# Patient Record
Sex: FEMALE | Race: WHITE | Employment: OTHER | URBAN - METROPOLITAN AREA
[De-identification: names, ages, dates, MRNs, and addresses within clinical notes are randomized per-mention and may not be internally consistent; named-entity substitution may affect disease eponyms.]

---

## 2019-08-15 ENCOUNTER — OFFICE VISIT (OUTPATIENT)
Dept: FAMILY MEDICINE CLINIC | Age: 79
End: 2019-08-15
Payer: MEDICARE

## 2019-08-15 VITALS
HEART RATE: 58 BPM | SYSTOLIC BLOOD PRESSURE: 151 MMHG | BODY MASS INDEX: 21.32 KG/M2 | DIASTOLIC BLOOD PRESSURE: 65 MMHG | OXYGEN SATURATION: 100 % | WEIGHT: 108.6 LBS | HEIGHT: 60 IN

## 2019-08-15 DIAGNOSIS — E78.5 DYSLIPIDEMIA: ICD-10-CM

## 2019-08-15 DIAGNOSIS — K21.9 GASTROESOPHAGEAL REFLUX DISEASE, ESOPHAGITIS PRESENCE NOT SPECIFIED: ICD-10-CM

## 2019-08-15 DIAGNOSIS — Z80.9 FH: CANCER: ICD-10-CM

## 2019-08-15 DIAGNOSIS — Z87.891 EX-SMOKER FOR MORE THAN 1 YEAR: ICD-10-CM

## 2019-08-15 DIAGNOSIS — N18.2 CHRONIC RENAL FAILURE, STAGE 2 (MILD): ICD-10-CM

## 2019-08-15 DIAGNOSIS — Z13.29 SCREENING FOR THYROID DISORDER: ICD-10-CM

## 2019-08-15 DIAGNOSIS — D64.9 ANEMIA, UNSPECIFIED TYPE: ICD-10-CM

## 2019-08-15 DIAGNOSIS — M81.0 AGE-RELATED OSTEOPOROSIS WITHOUT CURRENT PATHOLOGICAL FRACTURE: Primary | ICD-10-CM

## 2019-08-15 DIAGNOSIS — Z87.11 HISTORY OF BLEEDING PEPTIC ULCER: ICD-10-CM

## 2019-08-15 DIAGNOSIS — I10 ESSENTIAL HYPERTENSION: ICD-10-CM

## 2019-08-15 DIAGNOSIS — Z23 NEED FOR PNEUMOCOCCAL VACCINATION: ICD-10-CM

## 2019-08-15 DIAGNOSIS — G62.9 NEUROPATHY: ICD-10-CM

## 2019-08-15 DIAGNOSIS — E55.9 VITAMIN D DEFICIENCY: ICD-10-CM

## 2019-08-15 DIAGNOSIS — Z86.73 HISTORY OF ARTERIAL ISCHEMIC STROKE: ICD-10-CM

## 2019-08-15 DIAGNOSIS — R73.9 HYPERGLYCEMIA: ICD-10-CM

## 2019-08-15 PROCEDURE — 1090F PRES/ABSN URINE INCON ASSESS: CPT | Performed by: FAMILY MEDICINE

## 2019-08-15 PROCEDURE — 1036F TOBACCO NON-USER: CPT | Performed by: FAMILY MEDICINE

## 2019-08-15 PROCEDURE — 4040F PNEUMOC VAC/ADMIN/RCVD: CPT | Performed by: FAMILY MEDICINE

## 2019-08-15 PROCEDURE — 99204 OFFICE O/P NEW MOD 45 MIN: CPT | Performed by: FAMILY MEDICINE

## 2019-08-15 PROCEDURE — 1123F ACP DISCUSS/DSCN MKR DOCD: CPT | Performed by: FAMILY MEDICINE

## 2019-08-15 PROCEDURE — 90670 PCV13 VACCINE IM: CPT | Performed by: FAMILY MEDICINE

## 2019-08-15 PROCEDURE — G8420 CALC BMI NORM PARAMETERS: HCPCS | Performed by: FAMILY MEDICINE

## 2019-08-15 PROCEDURE — G0009 ADMIN PNEUMOCOCCAL VACCINE: HCPCS | Performed by: FAMILY MEDICINE

## 2019-08-15 PROCEDURE — G8400 PT W/DXA NO RESULTS DOC: HCPCS | Performed by: FAMILY MEDICINE

## 2019-08-15 PROCEDURE — G8427 DOCREV CUR MEDS BY ELIG CLIN: HCPCS | Performed by: FAMILY MEDICINE

## 2019-08-15 RX ORDER — CLOPIDOGREL BISULFATE 75 MG/1
75 TABLET ORAL DAILY
Qty: 90 TABLET | Refills: 1 | Status: SHIPPED | OUTPATIENT
Start: 2019-08-15 | End: 2019-09-12 | Stop reason: CLARIF

## 2019-08-15 RX ORDER — LISINOPRIL 30 MG/1
TABLET ORAL
COMMUNITY
End: 2019-08-28 | Stop reason: ALTCHOICE

## 2019-08-15 RX ORDER — CLONIDINE HYDROCHLORIDE 0.1 MG/1
0.1 TABLET ORAL 2 TIMES DAILY
COMMUNITY
End: 2019-08-15 | Stop reason: CLARIF

## 2019-08-15 RX ORDER — OMEPRAZOLE 40 MG/1
CAPSULE, DELAYED RELEASE ORAL
COMMUNITY
End: 2019-09-12 | Stop reason: ALTCHOICE

## 2019-08-15 RX ORDER — GABAPENTIN 300 MG/1
300 CAPSULE ORAL 3 TIMES DAILY
COMMUNITY
End: 2019-08-15 | Stop reason: DRUGHIGH

## 2019-08-15 RX ORDER — GABAPENTIN 300 MG/1
300 CAPSULE ORAL NIGHTLY
Qty: 90 CAPSULE | Refills: 0 | Status: SHIPPED | OUTPATIENT
Start: 2019-08-15 | End: 2019-09-12 | Stop reason: SDUPTHER

## 2019-08-15 RX ORDER — CLOPIDOGREL BISULFATE 75 MG/1
75 TABLET ORAL DAILY
COMMUNITY
End: 2019-08-15 | Stop reason: DRUGHIGH

## 2019-08-15 RX ORDER — ATORVASTATIN CALCIUM 10 MG/1
10 TABLET, FILM COATED ORAL DAILY
COMMUNITY
End: 2019-09-12 | Stop reason: CLARIF

## 2019-08-15 RX ORDER — ACETAMINOPHEN 500 MG
TABLET ORAL
COMMUNITY

## 2019-08-15 ASSESSMENT — ENCOUNTER SYMPTOMS
ABDOMINAL PAIN: 0
DIARRHEA: 0
EYE PAIN: 0
COLOR CHANGE: 0
RECTAL PAIN: 0
ANAL BLEEDING: 0
BACK PAIN: 0
EYE DISCHARGE: 0
CHEST TIGHTNESS: 0
VOICE CHANGE: 0
NAUSEA: 0
TROUBLE SWALLOWING: 0
EYE REDNESS: 0
BLOOD IN STOOL: 0
SHORTNESS OF BREATH: 0
CONSTIPATION: 0
COUGH: 0
VOMITING: 0
SINUS PRESSURE: 0
ABDOMINAL DISTENTION: 0

## 2019-08-15 ASSESSMENT — PATIENT HEALTH QUESTIONNAIRE - PHQ9
SUM OF ALL RESPONSES TO PHQ9 QUESTIONS 1 & 2: 0
SUM OF ALL RESPONSES TO PHQ QUESTIONS 1-9: 0
SUM OF ALL RESPONSES TO PHQ9 QUESTIONS 1 & 2: 1
2. FEELING DOWN, DEPRESSED OR HOPELESS: 0
1. LITTLE INTEREST OR PLEASURE IN DOING THINGS: 0
SUM OF ALL RESPONSES TO PHQ QUESTIONS 1-9: 0
SUM OF ALL RESPONSES TO PHQ QUESTIONS 1-9: 1
SUM OF ALL RESPONSES TO PHQ QUESTIONS 1-9: 0
1. LITTLE INTEREST OR PLEASURE IN DOING THINGS: 0
SUM OF ALL RESPONSES TO PHQ QUESTIONS 1-9: 1
2. FEELING DOWN, DEPRESSED OR HOPELESS: 1
SUM OF ALL RESPONSES TO PHQ QUESTIONS 1-9: 0
SUM OF ALL RESPONSES TO PHQ9 QUESTIONS 1 & 2: 0
2. FEELING DOWN, DEPRESSED OR HOPELESS: 0
1. LITTLE INTEREST OR PLEASURE IN DOING THINGS: 0

## 2019-08-15 NOTE — PROGRESS NOTES
Lipid Panel   3. Essential hypertension     4. History of arterial ischemic stroke  clopidogrel (PLAVIX) 75 MG tablet   5. Screening for thyroid disorder  T3, Free    T4, Free    TSH without Reflex   6. Hyperglycemia  Hemoglobin A1C   7. Anemia, unspecified type  CBC    Folate    Vitamin B12   8. Vitamin D deficiency  Vitamin D 25 Hydroxy   9. Need for pneumococcal vaccination  Pneumococcal conjugate vaccine 13-valent   10. History of bleeding peptic ulcer     11. Neuropathy  gabapentin (NEURONTIN) 300 MG capsule   12. Ex-smoker for more than 1 year           Plan:      Orders Placed This Encounter   Procedures    DEXA BONE DENSITY 2 SITES    Pneumococcal conjugate vaccine 13-valent    CBC    Comprehensive Metabolic Panel    Folate    Hemoglobin A1C    Lipid Panel    T3, Free    T4, Free    TSH without Reflex    Vitamin B12    Vitamin D 25 Hydroxy       Outpatient Encounter Medications as of 8/15/2019   Medication Sig Dispense Refill    omeprazole (PRILOSEC) 40 MG delayed release capsule omeprazole 40 mg capsule,delayed release   Take 1 capsule twice a day by oral route for 90 days.  lisinopril (PRINIVIL;ZESTRIL) 30 MG tablet lisinopril      atorvastatin (LIPITOR) 10 MG tablet Take 10 mg by mouth daily      BABY ASPIRIN PO Take by mouth      Calcium Carbonate-Vit D-Min (CALCIUM 1200) 3771-0875 MG-UNIT CHEW Take by mouth      gabapentin (NEURONTIN) 300 MG capsule Take 1 capsule by mouth nightly for 90 days. Intended supply: 90 days 90 capsule 0    clopidogrel (PLAVIX) 75 MG tablet Take 1 tablet by mouth daily 90 tablet 1    [DISCONTINUED] clopidogrel (PLAVIX) 75 MG tablet Take 75 mg by mouth daily      [DISCONTINUED] gabapentin (NEURONTIN) 300 MG capsule Take 300 mg by mouth 3 times daily.  [DISCONTINUED] cloNIDine (CATAPRES) 0.1 MG tablet Take 0.1 mg by mouth 2 times daily       No facility-administered encounter medications on file as of 8/15/2019.             Danni Hutchins MD

## 2019-08-19 ENCOUNTER — TELEPHONE (OUTPATIENT)
Dept: FAMILY MEDICINE CLINIC | Age: 79
End: 2019-08-19

## 2019-08-19 ENCOUNTER — HOSPITAL ENCOUNTER (OUTPATIENT)
Age: 79
Discharge: HOME | End: 2019-08-19
Payer: MEDICARE

## 2019-08-19 DIAGNOSIS — Z13.29 SCREENING FOR THYROID DISORDER: ICD-10-CM

## 2019-08-19 DIAGNOSIS — E55.9 VITAMIN D DEFICIENCY: ICD-10-CM

## 2019-08-19 DIAGNOSIS — E78.5 DYSLIPIDEMIA: ICD-10-CM

## 2019-08-19 DIAGNOSIS — D64.9 ANEMIA, UNSPECIFIED TYPE: ICD-10-CM

## 2019-08-19 DIAGNOSIS — R73.9 HYPERGLYCEMIA: ICD-10-CM

## 2019-08-19 LAB
ALBUMIN SERPL-MCNC: 4.8 G/DL (ref 3.5–5.2)
ALBUMIN/GLOBULIN RATIO: 2.2 (ref 1–2.5)
ALP BLD-CCNC: 74 U/L (ref 35–104)
ALT SERPL-CCNC: 11 U/L (ref 5–33)
ANION GAP SERPL CALCULATED.3IONS-SCNC: 13 MMOL/L (ref 9–17)
AST SERPL-CCNC: 18 U/L
BILIRUB SERPL-MCNC: 0.45 MG/DL (ref 0.3–1.2)
BUN BLDV-MCNC: 24 MG/DL (ref 8–23)
BUN/CREAT BLD: ABNORMAL (ref 9–20)
CALCIUM SERPL-MCNC: 9.8 MG/DL (ref 8.6–10.4)
CHLORIDE BLD-SCNC: 104 MMOL/L (ref 98–107)
CHOLESTEROL/HDL RATIO: 2
CHOLESTEROL: 221 MG/DL
CO2: 25 MMOL/L (ref 20–31)
CREAT SERPL-MCNC: 0.92 MG/DL (ref 0.5–0.9)
ESTIMATED AVERAGE GLUCOSE: 88 MG/DL
FOLATE: 10.7 NG/ML
GFR AFRICAN AMERICAN: >60 ML/MIN
GFR NON-AFRICAN AMERICAN: 59 ML/MIN
GFR SERPL CREATININE-BSD FRML MDRD: ABNORMAL ML/MIN/{1.73_M2}
GFR SERPL CREATININE-BSD FRML MDRD: ABNORMAL ML/MIN/{1.73_M2}
GLUCOSE BLD-MCNC: 97 MG/DL (ref 70–99)
HBA1C MFR BLD: 4.7 % (ref 4–6)
HCT VFR BLD CALC: 38.6 % (ref 36.3–47.1)
HDLC SERPL-MCNC: 108 MG/DL
HEMOGLOBIN: 12.1 G/DL (ref 11.9–15.1)
LDL CHOLESTEROL: 97 MG/DL (ref 0–130)
MCH RBC QN AUTO: 31.8 PG (ref 25.2–33.5)
MCHC RBC AUTO-ENTMCNC: 31.3 G/DL (ref 28.4–34.8)
MCV RBC AUTO: 101.6 FL (ref 82.6–102.9)
NRBC AUTOMATED: 0 PER 100 WBC
PDW BLD-RTO: 12.8 % (ref 11.8–14.4)
PLATELET # BLD: 168 K/UL (ref 138–453)
PMV BLD AUTO: 11.2 FL (ref 8.1–13.5)
POTASSIUM SERPL-SCNC: 4.3 MMOL/L (ref 3.7–5.3)
RBC # BLD: 3.8 M/UL (ref 3.95–5.11)
SODIUM BLD-SCNC: 142 MMOL/L (ref 135–144)
T3 FREE: 2.47 PG/ML (ref 2.02–4.43)
THYROXINE, FREE: 1.18 NG/DL (ref 0.93–1.7)
TOTAL PROTEIN: 7 G/DL (ref 6.4–8.3)
TRIGL SERPL-MCNC: 81 MG/DL
TSH SERPL DL<=0.05 MIU/L-ACNC: 0.6 MIU/L (ref 0.3–5)
VITAMIN B-12: 340 PG/ML (ref 232–1245)
VITAMIN D 25-HYDROXY: 39.3 NG/ML (ref 30–100)
VLDLC SERPL CALC-MCNC: ABNORMAL MG/DL (ref 1–30)
WBC # BLD: 3.9 K/UL (ref 3.5–11.3)

## 2019-08-19 PROCEDURE — 36415 COLL VENOUS BLD VENIPUNCTURE: CPT

## 2019-08-19 PROCEDURE — 84439 ASSAY OF FREE THYROXINE: CPT

## 2019-08-19 PROCEDURE — 82746 ASSAY OF FOLIC ACID SERUM: CPT

## 2019-08-19 PROCEDURE — 83036 HEMOGLOBIN GLYCOSYLATED A1C: CPT

## 2019-08-19 PROCEDURE — 85027 COMPLETE CBC AUTOMATED: CPT

## 2019-08-19 PROCEDURE — 82306 VITAMIN D 25 HYDROXY: CPT

## 2019-08-19 PROCEDURE — 84443 ASSAY THYROID STIM HORMONE: CPT

## 2019-08-19 PROCEDURE — 82607 VITAMIN B-12: CPT

## 2019-08-19 PROCEDURE — 80061 LIPID PANEL: CPT

## 2019-08-19 PROCEDURE — 80053 COMPREHEN METABOLIC PANEL: CPT

## 2019-08-19 PROCEDURE — 84481 FREE ASSAY (FT-3): CPT

## 2019-08-28 PROBLEM — N18.2 CHRONIC RENAL FAILURE, STAGE 2 (MILD): Status: ACTIVE | Noted: 2019-08-28

## 2019-08-28 RX ORDER — CLOPIDOGREL BISULFATE 75 MG/1
75 TABLET ORAL DAILY
Qty: 90 TABLET | Refills: 1 | Status: SHIPPED | OUTPATIENT
Start: 2019-08-28 | End: 2020-01-07 | Stop reason: DRUGHIGH

## 2019-08-28 RX ORDER — METOPROLOL SUCCINATE 25 MG/1
25 TABLET, EXTENDED RELEASE ORAL DAILY
Qty: 90 TABLET | Refills: 0 | Status: SHIPPED | OUTPATIENT
Start: 2019-08-28 | End: 2019-10-24 | Stop reason: DRUGHIGH

## 2019-08-28 RX ORDER — LISINOPRIL 20 MG/1
20 TABLET ORAL DAILY
COMMUNITY
End: 2019-09-12 | Stop reason: CLARIF

## 2019-08-28 RX ORDER — LISINOPRIL 20 MG/1
20 TABLET ORAL DAILY
Qty: 90 TABLET | Refills: 0 | Status: SHIPPED | OUTPATIENT
Start: 2019-08-28 | End: 2019-10-16 | Stop reason: SDUPTHER

## 2019-08-28 RX ORDER — ATORVASTATIN CALCIUM 10 MG/1
10 TABLET, FILM COATED ORAL DAILY
Qty: 90 TABLET | Refills: 1 | Status: SHIPPED | OUTPATIENT
Start: 2019-08-28 | End: 2019-12-18 | Stop reason: SDUPTHER

## 2019-08-28 RX ORDER — OMEPRAZOLE 40 MG/1
40 CAPSULE, DELAYED RELEASE ORAL
Qty: 90 CAPSULE | Refills: 0 | Status: SHIPPED | OUTPATIENT
Start: 2019-08-28 | End: 2019-09-12 | Stop reason: ALTCHOICE

## 2019-08-28 RX ORDER — GABAPENTIN 300 MG/1
300 CAPSULE ORAL NIGHTLY
Qty: 90 CAPSULE | Refills: 0 | Status: SHIPPED | OUTPATIENT
Start: 2019-08-28 | End: 2019-09-12 | Stop reason: SDUPTHER

## 2019-09-12 ENCOUNTER — OFFICE VISIT (OUTPATIENT)
Dept: FAMILY MEDICINE CLINIC | Age: 79
End: 2019-09-12
Payer: MEDICARE

## 2019-09-12 VITALS
HEIGHT: 60 IN | SYSTOLIC BLOOD PRESSURE: 140 MMHG | DIASTOLIC BLOOD PRESSURE: 66 MMHG | HEART RATE: 61 BPM | BODY MASS INDEX: 21.6 KG/M2 | WEIGHT: 110 LBS | OXYGEN SATURATION: 99 %

## 2019-09-12 DIAGNOSIS — Z23 NEED FOR INFLUENZA VACCINATION: ICD-10-CM

## 2019-09-12 DIAGNOSIS — N18.2 CHRONIC RENAL FAILURE, STAGE 2 (MILD): ICD-10-CM

## 2019-09-12 DIAGNOSIS — I10 ESSENTIAL HYPERTENSION: ICD-10-CM

## 2019-09-12 DIAGNOSIS — E78.5 DYSLIPIDEMIA: ICD-10-CM

## 2019-09-12 DIAGNOSIS — Z12.31 SCREENING MAMMOGRAM, ENCOUNTER FOR: ICD-10-CM

## 2019-09-12 DIAGNOSIS — Z87.891 EX-SMOKER FOR MORE THAN 1 YEAR: ICD-10-CM

## 2019-09-12 DIAGNOSIS — K21.9 GASTROESOPHAGEAL REFLUX DISEASE, ESOPHAGITIS PRESENCE NOT SPECIFIED: ICD-10-CM

## 2019-09-12 DIAGNOSIS — G62.9 NEUROPATHY: ICD-10-CM

## 2019-09-12 DIAGNOSIS — Z86.73 HISTORY OF ARTERIAL ISCHEMIC STROKE: ICD-10-CM

## 2019-09-12 DIAGNOSIS — N18.2 CHRONIC KIDNEY DISEASE, STAGE 2, MILDLY DECREASED GFR: ICD-10-CM

## 2019-09-12 DIAGNOSIS — E53.8 B12 DEFICIENCY: Primary | ICD-10-CM

## 2019-09-12 DIAGNOSIS — Z87.11 HISTORY OF BLEEDING PEPTIC ULCER: ICD-10-CM

## 2019-09-12 PROCEDURE — G8427 DOCREV CUR MEDS BY ELIG CLIN: HCPCS | Performed by: FAMILY MEDICINE

## 2019-09-12 PROCEDURE — 1090F PRES/ABSN URINE INCON ASSESS: CPT | Performed by: FAMILY MEDICINE

## 2019-09-12 PROCEDURE — 4040F PNEUMOC VAC/ADMIN/RCVD: CPT | Performed by: FAMILY MEDICINE

## 2019-09-12 PROCEDURE — G8420 CALC BMI NORM PARAMETERS: HCPCS | Performed by: FAMILY MEDICINE

## 2019-09-12 PROCEDURE — 90653 IIV ADJUVANT VACCINE IM: CPT | Performed by: FAMILY MEDICINE

## 2019-09-12 PROCEDURE — 99214 OFFICE O/P EST MOD 30 MIN: CPT | Performed by: FAMILY MEDICINE

## 2019-09-12 PROCEDURE — G0008 ADMIN INFLUENZA VIRUS VAC: HCPCS | Performed by: FAMILY MEDICINE

## 2019-09-12 PROCEDURE — G8400 PT W/DXA NO RESULTS DOC: HCPCS | Performed by: FAMILY MEDICINE

## 2019-09-12 PROCEDURE — 1036F TOBACCO NON-USER: CPT | Performed by: FAMILY MEDICINE

## 2019-09-12 PROCEDURE — 1123F ACP DISCUSS/DSCN MKR DOCD: CPT | Performed by: FAMILY MEDICINE

## 2019-09-12 RX ORDER — MAGNESIUM 200 MG
1 TABLET ORAL DAILY
Qty: 90 TABLET | Refills: 5 | Status: SHIPPED | OUTPATIENT
Start: 2019-09-12

## 2019-09-12 RX ORDER — PANTOPRAZOLE SODIUM 40 MG/1
40 TABLET, DELAYED RELEASE ORAL
Qty: 90 TABLET | Refills: 1 | Status: SHIPPED | OUTPATIENT
Start: 2019-09-12 | End: 2019-10-24 | Stop reason: DRUGHIGH

## 2019-09-12 RX ORDER — GABAPENTIN 300 MG/1
300 CAPSULE ORAL NIGHTLY
Qty: 90 CAPSULE | Refills: 0 | Status: SHIPPED | OUTPATIENT
Start: 2019-09-12 | End: 2019-12-05 | Stop reason: SDUPTHER

## 2019-09-12 ASSESSMENT — ENCOUNTER SYMPTOMS
COLOR CHANGE: 0
NAUSEA: 0
SHORTNESS OF BREATH: 0
ABDOMINAL DISTENTION: 0
CHEST TIGHTNESS: 0
ABDOMINAL PAIN: 0
COUGH: 0
VOMITING: 0
DIARRHEA: 0
CONSTIPATION: 0
ANAL BLEEDING: 0
EYE DISCHARGE: 0
EYE REDNESS: 0
TROUBLE SWALLOWING: 0
RECTAL PAIN: 0
EYE PAIN: 0
BLOOD IN STOOL: 0
VOICE CHANGE: 0
BACK PAIN: 0
SINUS PRESSURE: 0

## 2019-09-12 ASSESSMENT — PATIENT HEALTH QUESTIONNAIRE - PHQ9
SUM OF ALL RESPONSES TO PHQ QUESTIONS 1-9: 0
1. LITTLE INTEREST OR PLEASURE IN DOING THINGS: 0
2. FEELING DOWN, DEPRESSED OR HOPELESS: 0
SUM OF ALL RESPONSES TO PHQ9 QUESTIONS 1 & 2: 0
SUM OF ALL RESPONSES TO PHQ QUESTIONS 1-9: 0

## 2019-09-12 NOTE — PROGRESS NOTES
Subjective:      Patient ID: Lidia Contreras is a 67 y.o. female. HPI  Has still  be taking 30 mg of lisinopril. States has rx for toprol 25 as well as lisinopril 20 mg that I recommended. Her labs show lowered   GFR and elevated creatinine and I have discussed this with patient. So starting today she will cut down her lisinopril to 20 mg instead of the 30 mg a day she is currently on ( used to be on 30 mg BID till recently)  States used to be on toprol in the past but stopped. Review of Systems   Constitutional: Negative for activity change, appetite change and fatigue. HENT: Negative for dental problem, ear pain, hearing loss, postnasal drip, sinus pressure, sneezing, tinnitus, trouble swallowing and voice change. Eyes: Negative for pain, discharge, redness and visual disturbance. Respiratory: Negative for cough, chest tightness and shortness of breath. Cardiovascular: Negative for chest pain, palpitations and leg swelling. Gastrointestinal: Negative for abdominal distention, abdominal pain, anal bleeding, blood in stool, constipation, diarrhea, nausea, rectal pain and vomiting. Endocrine: Negative for cold intolerance, heat intolerance, polydipsia, polyphagia and polyuria. Genitourinary: Negative for decreased urine volume, difficulty urinating, dyspareunia, dysuria, enuresis, flank pain, frequency, genital sores, hematuria, menstrual problem, pelvic pain, urgency, vaginal bleeding and vaginal discharge. Musculoskeletal: Negative for arthralgias, back pain, gait problem, joint swelling, myalgias, neck pain and neck stiffness. Skin: Negative for color change, pallor and rash. Allergic/Immunologic: Negative for environmental allergies, food allergies and immunocompromised state. Neurological: Negative for dizziness, tremors, seizures, syncope, facial asymmetry, speech difficulty, weakness, light-headedness, numbness and headaches. Hematological: Negative for adenopathy.  Does not Encounter   Procedures    INFLUENZA, TRIV, INACTIVATED, SUBUNIT, ADJUVANTED, 65 YRS AND OLDER, IM, PREFILL SYR, 0.5ML (FLUAD TRIV)    Basic Metabolic Panel       Outpatient Encounter Medications as of 9/12/2019   Medication Sig Dispense Refill    Cyanocobalamin (B-12) 1000 MCG SUBL Place 1 tablet under the tongue daily 90 tablet 5    clopidogrel (PLAVIX) 75 MG tablet Take 1 tablet by mouth daily 90 tablet 1    omeprazole (PRILOSEC) 40 MG delayed release capsule Take 1 capsule by mouth every morning (before breakfast) 90 capsule 0    gabapentin (NEURONTIN) 300 MG capsule Take 1 capsule by mouth nightly for 90 doses. Intended supply: 90 days 90 capsule 0    atorvastatin (LIPITOR) 10 MG tablet Take 1 tablet by mouth daily 90 tablet 1    lisinopril (PRINIVIL;ZESTRIL) 20 MG tablet Take 1 tablet by mouth daily 90 tablet 0    metoprolol succinate (TOPROL XL) 25 MG extended release tablet Take 1 tablet by mouth daily 90 tablet 0    lisinopril (PRINIVIL;ZESTRIL) 20 MG tablet Take 20 mg by mouth daily      omeprazole (PRILOSEC) 40 MG delayed release capsule omeprazole 40 mg capsule,delayed release   Take 1 capsule twice a day by oral route for 90 days.  atorvastatin (LIPITOR) 10 MG tablet Take 10 mg by mouth daily      BABY ASPIRIN PO Take by mouth      Calcium Carbonate-Vit D-Min (CALCIUM 1200) 2043-6150 MG-UNIT CHEW Take by mouth      gabapentin (NEURONTIN) 300 MG capsule Take 1 capsule by mouth nightly for 90 days. Intended supply: 90 days 90 capsule 0    clopidogrel (PLAVIX) 75 MG tablet Take 1 tablet by mouth daily 90 tablet 1     No facility-administered encounter medications on file as of 9/12/2019.             Qi Gutierrez MD

## 2019-09-24 ENCOUNTER — HOSPITAL ENCOUNTER (OUTPATIENT)
Age: 79
Discharge: HOME | End: 2019-09-24
Payer: MEDICARE

## 2019-09-24 DIAGNOSIS — N18.2 CHRONIC KIDNEY DISEASE, STAGE 2, MILDLY DECREASED GFR: ICD-10-CM

## 2019-09-24 LAB
ANION GAP SERPL CALCULATED.3IONS-SCNC: 15 MMOL/L (ref 9–17)
BUN BLDV-MCNC: 36 MG/DL (ref 8–23)
BUN/CREAT BLD: ABNORMAL (ref 9–20)
CALCIUM SERPL-MCNC: 9.6 MG/DL (ref 8.6–10.4)
CHLORIDE BLD-SCNC: 105 MMOL/L (ref 98–107)
CO2: 22 MMOL/L (ref 20–31)
CREAT SERPL-MCNC: 0.99 MG/DL (ref 0.5–0.9)
GFR AFRICAN AMERICAN: >60 ML/MIN
GFR NON-AFRICAN AMERICAN: 54 ML/MIN
GFR SERPL CREATININE-BSD FRML MDRD: ABNORMAL ML/MIN/{1.73_M2}
GFR SERPL CREATININE-BSD FRML MDRD: ABNORMAL ML/MIN/{1.73_M2}
GLUCOSE BLD-MCNC: 99 MG/DL (ref 70–99)
POTASSIUM SERPL-SCNC: 4.9 MMOL/L (ref 3.7–5.3)
SODIUM BLD-SCNC: 142 MMOL/L (ref 135–144)

## 2019-09-24 PROCEDURE — 36415 COLL VENOUS BLD VENIPUNCTURE: CPT

## 2019-09-24 PROCEDURE — 80048 BASIC METABOLIC PNL TOTAL CA: CPT

## 2019-10-15 ENCOUNTER — TELEPHONE (OUTPATIENT)
Dept: FAMILY MEDICINE CLINIC | Age: 79
End: 2019-10-15

## 2019-10-16 DIAGNOSIS — I10 ESSENTIAL HYPERTENSION: ICD-10-CM

## 2019-10-16 RX ORDER — LISINOPRIL 20 MG/1
20 TABLET ORAL DAILY
Qty: 90 TABLET | Refills: 0 | Status: SHIPPED | OUTPATIENT
Start: 2019-10-16 | End: 2019-10-24 | Stop reason: DRUGHIGH

## 2019-10-24 ENCOUNTER — OFFICE VISIT (OUTPATIENT)
Dept: FAMILY MEDICINE CLINIC | Age: 79
End: 2019-10-24
Payer: MEDICARE

## 2019-10-24 VITALS
BODY MASS INDEX: 21.79 KG/M2 | DIASTOLIC BLOOD PRESSURE: 69 MMHG | WEIGHT: 111 LBS | OXYGEN SATURATION: 99 % | SYSTOLIC BLOOD PRESSURE: 140 MMHG | HEIGHT: 60 IN | HEART RATE: 72 BPM

## 2019-10-24 DIAGNOSIS — I25.10 CORONARY ARTERY DISEASE INVOLVING NATIVE HEART WITHOUT ANGINA PECTORIS, UNSPECIFIED VESSEL OR LESION TYPE: Primary | ICD-10-CM

## 2019-10-24 DIAGNOSIS — I10 ESSENTIAL HYPERTENSION: ICD-10-CM

## 2019-10-24 DIAGNOSIS — R92.8 ABNORMAL MAMMOGRAM OF BOTH BREASTS: ICD-10-CM

## 2019-10-24 DIAGNOSIS — K21.9 GASTROESOPHAGEAL REFLUX DISEASE, ESOPHAGITIS PRESENCE NOT SPECIFIED: ICD-10-CM

## 2019-10-24 DIAGNOSIS — Z86.73 HISTORY OF ARTERIAL ISCHEMIC STROKE: ICD-10-CM

## 2019-10-24 DIAGNOSIS — Z87.11 HISTORY OF BLEEDING PEPTIC ULCER: ICD-10-CM

## 2019-10-24 DIAGNOSIS — Z87.891 EX-SMOKER FOR MORE THAN 1 YEAR: ICD-10-CM

## 2019-10-24 DIAGNOSIS — N18.2 CHRONIC RENAL FAILURE, STAGE 2 (MILD): ICD-10-CM

## 2019-10-24 DIAGNOSIS — E78.5 DYSLIPIDEMIA: ICD-10-CM

## 2019-10-24 DIAGNOSIS — G62.9 NEUROPATHY: ICD-10-CM

## 2019-10-24 PROCEDURE — 99214 OFFICE O/P EST MOD 30 MIN: CPT | Performed by: FAMILY MEDICINE

## 2019-10-24 PROCEDURE — G8427 DOCREV CUR MEDS BY ELIG CLIN: HCPCS | Performed by: FAMILY MEDICINE

## 2019-10-24 PROCEDURE — G8420 CALC BMI NORM PARAMETERS: HCPCS | Performed by: FAMILY MEDICINE

## 2019-10-24 PROCEDURE — G8400 PT W/DXA NO RESULTS DOC: HCPCS | Performed by: FAMILY MEDICINE

## 2019-10-24 PROCEDURE — 1123F ACP DISCUSS/DSCN MKR DOCD: CPT | Performed by: FAMILY MEDICINE

## 2019-10-24 PROCEDURE — 4040F PNEUMOC VAC/ADMIN/RCVD: CPT | Performed by: FAMILY MEDICINE

## 2019-10-24 PROCEDURE — G8482 FLU IMMUNIZE ORDER/ADMIN: HCPCS | Performed by: FAMILY MEDICINE

## 2019-10-24 PROCEDURE — G8598 ASA/ANTIPLAT THER USED: HCPCS | Performed by: FAMILY MEDICINE

## 2019-10-24 PROCEDURE — 1036F TOBACCO NON-USER: CPT | Performed by: FAMILY MEDICINE

## 2019-10-24 PROCEDURE — 1090F PRES/ABSN URINE INCON ASSESS: CPT | Performed by: FAMILY MEDICINE

## 2019-10-24 RX ORDER — LISINOPRIL 10 MG/1
10 TABLET ORAL DAILY
Qty: 90 TABLET | Refills: 0 | Status: SHIPPED | OUTPATIENT
Start: 2019-10-24 | End: 2019-12-18 | Stop reason: DRUGHIGH

## 2019-10-24 RX ORDER — ATORVASTATIN CALCIUM 10 MG/1
10 TABLET, FILM COATED ORAL DAILY
Qty: 90 TABLET | Refills: 1 | Status: CANCELLED | OUTPATIENT
Start: 2019-10-24

## 2019-10-24 RX ORDER — PANTOPRAZOLE SODIUM 20 MG/1
20 TABLET, DELAYED RELEASE ORAL
Qty: 90 TABLET | Refills: 0 | Status: SHIPPED | OUTPATIENT
Start: 2019-10-24 | End: 2020-01-07 | Stop reason: DRUGHIGH

## 2019-10-24 RX ORDER — CLOPIDOGREL BISULFATE 75 MG/1
75 TABLET ORAL DAILY
Qty: 90 TABLET | Refills: 0 | Status: CANCELLED | OUTPATIENT
Start: 2019-10-24

## 2019-10-24 RX ORDER — METOPROLOL SUCCINATE 50 MG/1
50 TABLET, EXTENDED RELEASE ORAL DAILY
Qty: 90 TABLET | Refills: 0 | Status: SHIPPED | OUTPATIENT
Start: 2019-10-24 | End: 2019-12-12 | Stop reason: SDUPTHER

## 2019-10-24 ASSESSMENT — ENCOUNTER SYMPTOMS
TROUBLE SWALLOWING: 0
EYE PAIN: 0
SHORTNESS OF BREATH: 0
COUGH: 0
ABDOMINAL PAIN: 0
CONSTIPATION: 0
SINUS PRESSURE: 0
ANAL BLEEDING: 0
BLOOD IN STOOL: 0
VOICE CHANGE: 0
EYE REDNESS: 0
DIARRHEA: 0
NAUSEA: 0
COLOR CHANGE: 0
EYE DISCHARGE: 0
VOMITING: 0
CHEST TIGHTNESS: 0
ABDOMINAL DISTENTION: 0
RECTAL PAIN: 0
BACK PAIN: 0

## 2019-10-24 ASSESSMENT — PATIENT HEALTH QUESTIONNAIRE - PHQ9
2. FEELING DOWN, DEPRESSED OR HOPELESS: 0
SUM OF ALL RESPONSES TO PHQ QUESTIONS 1-9: 0
SUM OF ALL RESPONSES TO PHQ9 QUESTIONS 1 & 2: 0
SUM OF ALL RESPONSES TO PHQ QUESTIONS 1-9: 0
1. LITTLE INTEREST OR PLEASURE IN DOING THINGS: 0

## 2019-12-05 RX ORDER — GABAPENTIN 300 MG/1
300 CAPSULE ORAL NIGHTLY
Qty: 90 CAPSULE | Refills: 1 | Status: SHIPPED | OUTPATIENT
Start: 2019-12-05 | End: 2020-01-07 | Stop reason: DRUGHIGH

## 2019-12-11 ENCOUNTER — HOSPITAL ENCOUNTER (OUTPATIENT)
Dept: MAMMOGRAPHY | Age: 79
Discharge: HOME | End: 2019-12-13
Payer: MEDICARE

## 2019-12-11 DIAGNOSIS — Z12.31 SCREENING MAMMOGRAM, ENCOUNTER FOR: ICD-10-CM

## 2019-12-11 PROCEDURE — 77063 BREAST TOMOSYNTHESIS BI: CPT

## 2019-12-12 RX ORDER — METOPROLOL SUCCINATE 50 MG/1
50 TABLET, EXTENDED RELEASE ORAL DAILY
Qty: 90 TABLET | Refills: 1 | Status: SHIPPED | OUTPATIENT
Start: 2019-12-12 | End: 2020-01-07 | Stop reason: DRUGHIGH

## 2019-12-18 RX ORDER — LISINOPRIL 5 MG/1
5 TABLET ORAL DAILY
Qty: 90 TABLET | Refills: 1 | Status: SHIPPED | OUTPATIENT
Start: 2019-12-18 | End: 2020-01-07 | Stop reason: DRUGHIGH

## 2019-12-18 RX ORDER — ATORVASTATIN CALCIUM 10 MG/1
10 TABLET, FILM COATED ORAL DAILY
Qty: 90 TABLET | Refills: 1 | Status: SHIPPED | OUTPATIENT
Start: 2019-12-18 | End: 2020-01-07 | Stop reason: DRUGHIGH

## 2020-01-02 ENCOUNTER — HOSPITAL ENCOUNTER (OUTPATIENT)
Age: 80
Discharge: HOME | End: 2020-01-02
Payer: MEDICARE

## 2020-01-02 LAB
ABSOLUTE EOS #: 0.14 K/UL (ref 0–0.44)
ABSOLUTE IMMATURE GRANULOCYTE: 0.07 K/UL (ref 0–0.3)
ABSOLUTE LYMPH #: 1.24 K/UL (ref 1.1–3.7)
ABSOLUTE MONO #: 0.48 K/UL (ref 0.1–1.2)
ANION GAP SERPL CALCULATED.3IONS-SCNC: 16 MMOL/L (ref 9–17)
BASOPHILS # BLD: 1 % (ref 0–2)
BASOPHILS ABSOLUTE: 0.05 K/UL (ref 0–0.2)
BUN BLDV-MCNC: 27 MG/DL (ref 8–23)
BUN/CREAT BLD: ABNORMAL (ref 9–20)
CALCIUM SERPL-MCNC: 9.3 MG/DL (ref 8.6–10.4)
CHLORIDE BLD-SCNC: 102 MMOL/L (ref 98–107)
CO2: 24 MMOL/L (ref 20–31)
CREAT SERPL-MCNC: 0.81 MG/DL (ref 0.5–0.9)
DIFFERENTIAL TYPE: ABNORMAL
EOSINOPHILS RELATIVE PERCENT: 2 % (ref 1–4)
GFR AFRICAN AMERICAN: >60 ML/MIN
GFR NON-AFRICAN AMERICAN: >60 ML/MIN
GFR SERPL CREATININE-BSD FRML MDRD: ABNORMAL ML/MIN/{1.73_M2}
GFR SERPL CREATININE-BSD FRML MDRD: ABNORMAL ML/MIN/{1.73_M2}
GLUCOSE BLD-MCNC: 95 MG/DL (ref 70–99)
HCT VFR BLD CALC: 35.9 % (ref 36.3–47.1)
HEMOGLOBIN: 11.3 G/DL (ref 11.9–15.1)
IMMATURE GRANULOCYTES: 1 %
LYMPHOCYTES # BLD: 20 % (ref 24–43)
MCH RBC QN AUTO: 30.3 PG (ref 25.2–33.5)
MCHC RBC AUTO-ENTMCNC: 31.5 G/DL (ref 28.4–34.8)
MCV RBC AUTO: 96.2 FL (ref 82.6–102.9)
MONOCYTES # BLD: 8 % (ref 3–12)
NRBC AUTOMATED: 0 PER 100 WBC
PDW BLD-RTO: 12.3 % (ref 11.8–14.4)
PLATELET # BLD: 265 K/UL (ref 138–453)
PLATELET ESTIMATE: ABNORMAL
PMV BLD AUTO: 10.1 FL (ref 8.1–13.5)
POTASSIUM SERPL-SCNC: 4.1 MMOL/L (ref 3.7–5.3)
RBC # BLD: 3.73 M/UL (ref 3.95–5.11)
RBC # BLD: ABNORMAL 10*6/UL
SEG NEUTROPHILS: 68 % (ref 36–65)
SEGMENTED NEUTROPHILS ABSOLUTE COUNT: 4.36 K/UL (ref 1.5–8.1)
SODIUM BLD-SCNC: 142 MMOL/L (ref 135–144)
WBC # BLD: 6.3 K/UL (ref 3.5–11.3)
WBC # BLD: ABNORMAL 10*3/UL

## 2020-01-02 PROCEDURE — 85025 COMPLETE CBC W/AUTO DIFF WBC: CPT

## 2020-01-02 PROCEDURE — 80048 BASIC METABOLIC PNL TOTAL CA: CPT

## 2020-01-02 PROCEDURE — 36415 COLL VENOUS BLD VENIPUNCTURE: CPT

## 2020-01-07 ENCOUNTER — OFFICE VISIT (OUTPATIENT)
Dept: FAMILY MEDICINE CLINIC | Age: 80
End: 2020-01-07
Payer: MEDICARE

## 2020-01-07 VITALS
HEIGHT: 60 IN | DIASTOLIC BLOOD PRESSURE: 70 MMHG | WEIGHT: 114.2 LBS | BODY MASS INDEX: 22.42 KG/M2 | HEART RATE: 65 BPM | SYSTOLIC BLOOD PRESSURE: 142 MMHG | OXYGEN SATURATION: 98 %

## 2020-01-07 PROCEDURE — 1123F ACP DISCUSS/DSCN MKR DOCD: CPT | Performed by: FAMILY MEDICINE

## 2020-01-07 PROCEDURE — G0439 PPPS, SUBSEQ VISIT: HCPCS | Performed by: FAMILY MEDICINE

## 2020-01-07 PROCEDURE — 4040F PNEUMOC VAC/ADMIN/RCVD: CPT | Performed by: FAMILY MEDICINE

## 2020-01-07 PROCEDURE — G8482 FLU IMMUNIZE ORDER/ADMIN: HCPCS | Performed by: FAMILY MEDICINE

## 2020-01-07 RX ORDER — METOPROLOL SUCCINATE 50 MG/1
50 TABLET, EXTENDED RELEASE ORAL DAILY
Qty: 90 TABLET | Refills: 1 | Status: SHIPPED | OUTPATIENT
Start: 2020-01-07 | End: 2020-03-23 | Stop reason: SDUPTHER

## 2020-01-07 RX ORDER — ATORVASTATIN CALCIUM 10 MG/1
10 TABLET, FILM COATED ORAL DAILY
Qty: 90 TABLET | Refills: 1 | Status: SHIPPED | OUTPATIENT
Start: 2020-01-07 | End: 2020-02-18 | Stop reason: SDUPTHER

## 2020-01-07 RX ORDER — PANTOPRAZOLE SODIUM 20 MG/1
20 TABLET, DELAYED RELEASE ORAL
Qty: 90 TABLET | Refills: 1 | Status: SHIPPED | OUTPATIENT
Start: 2020-01-07 | End: 2020-03-23 | Stop reason: SDUPTHER

## 2020-01-07 RX ORDER — CLOPIDOGREL BISULFATE 75 MG/1
75 TABLET ORAL DAILY
Qty: 30 TABLET | Refills: 3 | Status: SHIPPED | OUTPATIENT
Start: 2020-01-07 | End: 2020-03-23 | Stop reason: SDUPTHER

## 2020-01-07 RX ORDER — LISINOPRIL 5 MG/1
5 TABLET ORAL DAILY
Qty: 90 TABLET | Refills: 1 | Status: SHIPPED | OUTPATIENT
Start: 2020-01-07 | End: 2020-03-23 | Stop reason: SDUPTHER

## 2020-01-07 RX ORDER — GABAPENTIN 100 MG/1
100 CAPSULE ORAL NIGHTLY
Qty: 90 CAPSULE | Refills: 0 | Status: SHIPPED | OUTPATIENT
Start: 2020-01-07 | End: 2020-09-08

## 2020-01-07 ASSESSMENT — LIFESTYLE VARIABLES
AUDIT-C TOTAL SCORE: 1
HOW OFTEN DO YOU HAVE SIX OR MORE DRINKS ON ONE OCCASION: 0
HOW OFTEN DO YOU HAVE A DRINK CONTAINING ALCOHOL: 1
HOW MANY STANDARD DRINKS CONTAINING ALCOHOL DO YOU HAVE ON A TYPICAL DAY: 0

## 2020-01-07 ASSESSMENT — PATIENT HEALTH QUESTIONNAIRE - PHQ9
SUM OF ALL RESPONSES TO PHQ QUESTIONS 1-9: 0
SUM OF ALL RESPONSES TO PHQ QUESTIONS 1-9: 0

## 2020-01-07 NOTE — PROGRESS NOTES
Medicare Annual Wellness Visit  Name: Nevaeh Lopez Date: 2020   MRN: [id number] Sex: Female   Age: 67 y.o. Ethnicity: Non-/Non    : 1940 Race: Maren Baron is here for Medicare AWV and Discuss Labs    Screenings for behavioral, psychosocial and functional/safety risks, and cognitive dysfunction are all negative except as indicated below. These results, as well as other patient data from the [address] form, are documented in Flowsheets linked to this Encounter. All meds refilled. Was on iron infusion in [place] for Iron def. Had colonoscopy/EGD  by Dr Silvano Harvey 2 years ago and was ok. Allergies   Allergen Reactions    Clarithromycin     Levaquin  [Levofloxacin In D5w]     Levofloxacin     Sertraline        Prior to Visit Medications    Medication Sig Taking? Authorizing Provider   lisinopril (PRINIVIL;ZESTRIL) 5 MG tablet Take 1 tablet by mouth daily Yes Dylan Dahl MD   atorvastatin (LIPITOR) 10 MG tablet Take 1 tablet by mouth daily Yes Dylan Dahl MD   metoprolol succinate (TOPROL XL) 50 MG extended release tablet Take 1 tablet by mouth daily Yes Dylan Dahl MD   gabapentin (NEURONTIN) 300 MG capsule Take 1 capsule by mouth nightly for 180 days.  Intended supply: 90 days Yes Dylan Dahl MD   pantoprazole (PROTONIX) 20 MG tablet Take 1 tablet by mouth every morning (before breakfast) Yes Dylan Dahl MD   Cyanocobalamin (B-12) 1000 MCG SUBL Place 1 tablet under the tongue daily Yes Dylan Dahl MD   clopidogrel (PLAVIX) 75 MG tablet Take 1 tablet by mouth daily Yes Dylan Dahl MD   BABY ASPIRIN PO Take by mouth Indications: take 3 times a week ( she stopped and I have asked she resume as it was recommended by Cardio)  Yes Historical Provider, MD   Calcium Carbonate-Vit D-Min (CALCIUM 1200) 6714-2536 MG-UNIT CHEW Take by mouth Yes Historical Provider, MD       Past Medical History:   Diagnosis Date    Hypertension Past Surgical History:   Procedure Laterality Date    APPENDECTOMY      CHOLECYSTECTOMY      HYSTERECTOMY, TOTAL ABDOMINAL      TONSILLECTOMY         History reviewed. No pertinent family history. CareTeam (Including outside providers/suppliers regularly involved in providing care):   Patient Care Team:  Anders Morris MD as PCP - General (Family Medicine)  Anders Morris MD as PCP - [facility]    Wt Readings from Last 3 Encounters:   01/07/20 114 lb 3.2 oz (51.8 kg)   10/24/19 111 lb (50.3 kg)   09/12/19 110 lb (49.9 kg)     Vitals:    01/07/20 1023 01/07/20 1032   BP: (!) 152/75 (!) 146/75   Pulse: 68 65   SpO2: 98%    Weight: 114 lb 3.2 oz (51.8 kg)    Height: 5' (1.524 m)      Body mass index is 22.3 kg/m². Based upon direct observation of the patient, evaluation of cognition reveals recent and remote memory intact.     General Appearance: alert and oriented to person, place and time, well developed and well- nourished, in no acute distress  Skin: warm and dry, no rash or erythema  Head: normocephalic and atraumatic  Eyes: pupils equal, round, and reactive to light, extraocular eye movements intact, conjunctivae normal  ENT: tympanic membrane, external ear and ear canal normal bilaterally, nose without deformity, nasal mucosa and turbinates normal without polyps  Neck: supple and non-tender without mass, no thyromegaly or thyroid nodules, no cervical lymphadenopathy  Pulmonary/Chest: clear to auscultation bilaterally- no wheezes, rales or rhonchi, normal air movement, no respiratory distress  Cardiovascular: normal rate, regular rhythm, normal S1 and S2, no murmurs, rubs, clicks, or gallops, distal pulses intact, no carotid bruits  Abdomen: soft, non-tender, non-distended, normal bowel sounds, no masses or organomegaly  Extremities: no cyanosis, clubbing or edema  Musculoskeletal: normal range of motion, no joint swelling, deformity or tenderness  Neurologic: reflexes normal and

## 2020-01-07 NOTE — PATIENT INSTRUCTIONS
Personalized Preventive Plan for [place]  - 1/7/2020  Medicare offers a range of preventive health benefits. Some of the tests and screenings are paid in full while other may be subject to a deductible, co-insurance, and/or copay. Some of these benefits include a comprehensive review of your medical history including lifestyle, illnesses that may run in your family, and various assessments and screenings as appropriate. After reviewing your medical record and screening and assessments performed today your provider may have ordered immunizations, labs, imaging, and/or referrals for you. A list of these orders (if applicable) as well as your Preventive Care list are included within your After Visit Summary for your review. Other Preventive Recommendations:    · A preventive eye exam performed by an eye specialist is recommended every 1-2 years to screen for glaucoma; cataracts, macular degeneration, and other eye disorders. · A preventive dental visit is recommended every 6 months. · Try to get at least 150 minutes of exercise per week or 10,000 steps per day on a pedometer . · Order or download the FREE \"Exercise & Physical Activity: Your Everyday Guide\" from [employer]. Call 5-121.149.9846 or search [employer] online. · You need 9493-0323 mg of calcium and 7765-3677 IU of vitamin D per day. It is possible to meet your calcium requirement with diet alone, but a vitamin D supplement is usually necessary to meet this goal.  · When exposed to the sun, use a sunscreen that protects against both UVA and UVB radiation with an SPF of 30 or greater. Reapply every 2 to 3 hours or after sweating, drying off with a towel, or swimming. · Always wear a seat belt when traveling in a car. Always wear a helmet when riding a bicycle or motorcycle.

## 2020-01-10 ENCOUNTER — TELEPHONE (OUTPATIENT)
Dept: FAMILY MEDICINE CLINIC | Age: 80
End: 2020-01-10

## 2020-01-10 ENCOUNTER — HOSPITAL ENCOUNTER (OUTPATIENT)
Dept: MAMMOGRAPHY | Age: 80
Discharge: HOME | End: 2020-01-12
Payer: MEDICARE

## 2020-01-10 ENCOUNTER — HOSPITAL ENCOUNTER (OUTPATIENT)
Dept: ULTRASOUND IMAGING | Age: 80
Discharge: HOME | End: 2020-01-12
Payer: MEDICARE

## 2020-01-10 PROCEDURE — 76642 ULTRASOUND BREAST LIMITED: CPT

## 2020-01-10 PROCEDURE — G0279 TOMOSYNTHESIS, MAMMO: HCPCS

## 2020-01-10 NOTE — TELEPHONE ENCOUNTER
Pt called and stated that the cardiologist changed her atorvastatin from 10 to 40 and she now need the rx     So I told her to have the cardiologist send over his notes so you will know what's going on

## 2020-02-10 ENCOUNTER — HOSPITAL ENCOUNTER (OUTPATIENT)
Age: 80
Discharge: HOME | End: 2020-02-10
Payer: MEDICARE

## 2020-02-10 LAB
FERRITIN: 40 UG/L (ref 13–150)
HCT VFR BLD CALC: 37.5 % (ref 36.3–47.1)
HEMOGLOBIN: 11.5 G/DL (ref 11.9–15.1)
IRON SATURATION: 21 % (ref 20–55)
IRON: 62 UG/DL (ref 37–145)
MCH RBC QN AUTO: 30.3 PG (ref 25.2–33.5)
MCHC RBC AUTO-ENTMCNC: 30.7 G/DL (ref 28.4–34.8)
MCV RBC AUTO: 98.9 FL (ref 82.6–102.9)
NRBC AUTOMATED: 0 PER 100 WBC
PDW BLD-RTO: 13 % (ref 11.8–14.4)
PLATELET # BLD: 167 K/UL (ref 138–453)
PMV BLD AUTO: 11.1 FL (ref 8.1–13.5)
RBC # BLD: 3.79 M/UL (ref 3.95–5.11)
TOTAL IRON BINDING CAPACITY: 301 UG/DL (ref 250–450)
UNSATURATED IRON BINDING CAPACITY: 239 UG/DL (ref 112–347)
WBC # BLD: 4.1 K/UL (ref 3.5–11.3)

## 2020-02-10 PROCEDURE — 36415 COLL VENOUS BLD VENIPUNCTURE: CPT

## 2020-02-10 PROCEDURE — 82728 ASSAY OF FERRITIN: CPT

## 2020-02-10 PROCEDURE — 83550 IRON BINDING TEST: CPT

## 2020-02-10 PROCEDURE — 85027 COMPLETE CBC AUTOMATED: CPT

## 2020-02-10 PROCEDURE — 83540 ASSAY OF IRON: CPT

## 2020-02-18 ENCOUNTER — OFFICE VISIT (OUTPATIENT)
Dept: FAMILY MEDICINE CLINIC | Age: 80
End: 2020-02-18
Payer: MEDICARE

## 2020-02-18 VITALS
DIASTOLIC BLOOD PRESSURE: 74 MMHG | HEIGHT: 60 IN | OXYGEN SATURATION: 99 % | BODY MASS INDEX: 22.89 KG/M2 | HEART RATE: 59 BPM | SYSTOLIC BLOOD PRESSURE: 148 MMHG | WEIGHT: 116.6 LBS

## 2020-02-18 PROBLEM — N18.2 CHRONIC RENAL FAILURE, STAGE 2 (MILD): Status: RESOLVED | Noted: 2019-08-28 | Resolved: 2020-02-18

## 2020-02-18 PROBLEM — E53.8 B12 DEFICIENCY: Status: ACTIVE | Noted: 2020-02-18

## 2020-02-18 PROBLEM — I10 HYPERTENSION: Status: ACTIVE | Noted: 2020-02-18

## 2020-02-18 PROCEDURE — 1090F PRES/ABSN URINE INCON ASSESS: CPT | Performed by: FAMILY MEDICINE

## 2020-02-18 PROCEDURE — G8400 PT W/DXA NO RESULTS DOC: HCPCS | Performed by: FAMILY MEDICINE

## 2020-02-18 PROCEDURE — 4040F PNEUMOC VAC/ADMIN/RCVD: CPT | Performed by: FAMILY MEDICINE

## 2020-02-18 PROCEDURE — G8482 FLU IMMUNIZE ORDER/ADMIN: HCPCS | Performed by: FAMILY MEDICINE

## 2020-02-18 PROCEDURE — G8420 CALC BMI NORM PARAMETERS: HCPCS | Performed by: FAMILY MEDICINE

## 2020-02-18 PROCEDURE — G8427 DOCREV CUR MEDS BY ELIG CLIN: HCPCS | Performed by: FAMILY MEDICINE

## 2020-02-18 PROCEDURE — 1036F TOBACCO NON-USER: CPT | Performed by: FAMILY MEDICINE

## 2020-02-18 PROCEDURE — 1123F ACP DISCUSS/DSCN MKR DOCD: CPT | Performed by: FAMILY MEDICINE

## 2020-02-18 PROCEDURE — 99213 OFFICE O/P EST LOW 20 MIN: CPT | Performed by: FAMILY MEDICINE

## 2020-02-18 RX ORDER — ATORVASTATIN CALCIUM 40 MG/1
TABLET, FILM COATED ORAL
COMMUNITY
Start: 2020-01-24 | End: 2020-03-23 | Stop reason: SDUPTHER

## 2020-02-18 ASSESSMENT — PATIENT HEALTH QUESTIONNAIRE - PHQ9
SUM OF ALL RESPONSES TO PHQ QUESTIONS 1-9: 0
SUM OF ALL RESPONSES TO PHQ QUESTIONS 1-9: 0
1. LITTLE INTEREST OR PLEASURE IN DOING THINGS: 0
1. LITTLE INTEREST OR PLEASURE IN DOING THINGS: 0
SUM OF ALL RESPONSES TO PHQ9 QUESTIONS 1 & 2: 0
2. FEELING DOWN, DEPRESSED OR HOPELESS: 0
SUM OF ALL RESPONSES TO PHQ QUESTIONS 1-9: 0
SUM OF ALL RESPONSES TO PHQ9 QUESTIONS 1 & 2: 0
2. FEELING DOWN, DEPRESSED OR HOPELESS: 0
SUM OF ALL RESPONSES TO PHQ QUESTIONS 1-9: 0

## 2020-02-18 ASSESSMENT — ENCOUNTER SYMPTOMS
BACK PAIN: 0
TROUBLE SWALLOWING: 0
CONSTIPATION: 0
DIARRHEA: 0
EYE PAIN: 0
ANAL BLEEDING: 0
BLOOD IN STOOL: 0
EYE DISCHARGE: 0
SINUS PRESSURE: 0
COLOR CHANGE: 0
RECTAL PAIN: 0
ABDOMINAL PAIN: 0
SHORTNESS OF BREATH: 0
ABDOMINAL DISTENTION: 0
CHEST TIGHTNESS: 0
NAUSEA: 0
COUGH: 0
EYE REDNESS: 0
VOMITING: 0
VOICE CHANGE: 0

## 2020-02-18 NOTE — PROGRESS NOTES
Subjective:      Patient ID: Quentin Minaya is a 67 y.o. female. HPI State saw cardiologist and was advised to cont plavix and increased statin fron 10 to 40 mg daily.  was started on Plavix a year ago this may after her stroke. Cardiology has advised her to cont this long term. He will see her in a year. Will redo lipids and lft as well as BMP and cbc in 3 months. 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 Mood ok , sleep is on and off only sleeps for 3-4 hours and then tosses and turns.  wakes up to use RR twice but falls back to sleep after. This makes her tired in the daytime, sometimes has to take naps. her appetite ok as well. States finally BP is controlled at home when she checks it is always good and she does not have any   headaches or dizziness or other sx at present that she used to have after the meds were adjusted down. Review of Systems   Constitutional: Negative for activity change, appetite change and fatigue. HENT: Negative for dental problem, ear pain, hearing loss, postnasal drip, sinus pressure, sneezing, tinnitus, trouble swallowing and voice change. Eyes: Negative for pain, discharge, redness and visual disturbance. Respiratory: Negative for cough, chest tightness and shortness of breath. Cardiovascular: Negative for chest pain, palpitations and leg swelling. Gastrointestinal: Negative for abdominal distention, abdominal pain, anal bleeding, blood in stool, constipation, diarrhea, nausea, rectal pain and vomiting. Endocrine: Negative for cold intolerance, heat intolerance, polydipsia, polyphagia and polyuria. Genitourinary: Negative for decreased urine volume, difficulty urinating, dyspareunia, dysuria, enuresis, flank pain, frequency, genital sores, hematuria, menstrual problem, pelvic pain, urgency, vaginal bleeding and vaginal discharge.    Musculoskeletal: Negative for arthralgias, back pain, gait problem, joint swelling, myalgias, neck pain and neck stiffness. Skin: Negative for color change, pallor and rash. Allergic/Immunologic: Negative for environmental allergies, food allergies and immunocompromised state. Neurological: Negative for dizziness, tremors, seizures, syncope, facial asymmetry, speech difficulty, weakness, light-headedness, numbness and headaches. Hematological: Negative for adenopathy. Does not bruise/bleed easily. Psychiatric/Behavioral: Negative for agitation, behavioral problems, confusion, decreased concentration, sleep disturbance and suicidal ideas. The patient is not nervous/anxious. Objective:   Physical Exam  Constitutional:       General: She is not in acute distress. HENT:      Head: Normocephalic and atraumatic. Right Ear: External ear normal.      Left Ear: External ear normal.   Eyes:      Conjunctiva/sclera: Conjunctivae normal.      Pupils: Pupils are equal, round, and reactive to light. Neck:      Musculoskeletal: Normal range of motion. Thyroid: No thyromegaly. Trachea: No tracheal deviation. Cardiovascular:      Rate and Rhythm: Normal rate and regular rhythm. Heart sounds: No murmur. No friction rub. No gallop. Pulmonary:      Effort: No respiratory distress. Breath sounds: No stridor. No wheezing or rales. Chest:      Chest wall: No tenderness. Abdominal:      General: Bowel sounds are normal. There is no distension. Palpations: Abdomen is soft. Tenderness: There is no abdominal tenderness. There is no rebound. Musculoskeletal: Normal range of motion. Lymphadenopathy:      Cervical: No cervical adenopathy. Skin:     General: Skin is warm. Coloration: Skin is not pale. Findings: No erythema or rash. Neurological:      Mental Status: She is alert and oriented to person, place, and time. Cranial Nerves: No cranial nerve deficit. Motor: No abnormal muscle tone.       Deep Tendon Reflexes: Reflexes normal.         Assessment:       Diagnosis Orders   1. Dyslipidemia     2. History of bleeding peptic ulcer     3. History of arterial ischemic stroke     4. Neuropathy     5. Ex-smoker for more than 1 year     6. Chronic renal failure, stage 2 (mild)     7. Gastroesophageal reflux disease, esophagitis presence not specified     8. Essential hypertension           Plan:      No orders of the defined types were placed in this encounter. Outpatient Encounter Medications as of 2/18/2020   Medication Sig Dispense Refill    atorvastatin (LIPITOR) 40 MG tablet       lisinopril (PRINIVIL;ZESTRIL) 5 MG tablet Take 1 tablet by mouth daily 90 tablet 1    metoprolol succinate (TOPROL XL) 50 MG extended release tablet Take 1 tablet by mouth daily 90 tablet 1    pantoprazole (PROTONIX) 20 MG tablet Take 1 tablet by mouth every morning (before breakfast) 90 tablet 1    gabapentin (NEURONTIN) 100 MG capsule Take 1 capsule by mouth nightly for 90 days. Intended supply: 90 days 90 capsule 0    clopidogrel (PLAVIX) 75 MG tablet Take 1 tablet by mouth daily 30 tablet 3    Cyanocobalamin (B-12) 1000 MCG SUBL Place 1 tablet under the tongue daily 90 tablet 5    BABY ASPIRIN PO Take by mouth Indications: take 3 times a week ( she stopped and I have asked she resume as it was recommended by Cardio)       Calcium Carbonate-Vit D-Min (CALCIUM 1200) 2691-5759 MG-UNIT CHEW Take by mouth      [DISCONTINUED] aspirin 1 MG/ML SUSP Take by mouth      [DISCONTINUED] atorvastatin (LIPITOR) 10 MG tablet Take 1 tablet by mouth daily 90 tablet 1     No facility-administered encounter medications on file as of 2/18/2020.             Gregorio Cortez MD

## 2020-03-13 RX ORDER — GABAPENTIN 100 MG/1
100 CAPSULE ORAL DAILY
Qty: 90 CAPSULE | Refills: 0 | Status: SHIPPED | OUTPATIENT
Start: 2020-04-07 | End: 2020-05-01 | Stop reason: SDUPTHER

## 2020-03-23 RX ORDER — PANTOPRAZOLE SODIUM 20 MG/1
20 TABLET, DELAYED RELEASE ORAL
Qty: 90 TABLET | Refills: 1 | Status: SHIPPED | OUTPATIENT
Start: 2020-04-07 | End: 2020-05-01 | Stop reason: SDUPTHER

## 2020-03-23 RX ORDER — METOPROLOL SUCCINATE 50 MG/1
50 TABLET, EXTENDED RELEASE ORAL DAILY
Qty: 90 TABLET | Refills: 1 | Status: SHIPPED | OUTPATIENT
Start: 2020-04-07 | End: 2020-05-01 | Stop reason: SDUPTHER

## 2020-03-23 RX ORDER — CLOPIDOGREL BISULFATE 75 MG/1
75 TABLET ORAL DAILY
Qty: 90 TABLET | Refills: 1 | Status: SHIPPED | OUTPATIENT
Start: 2020-04-07 | End: 2020-04-24 | Stop reason: SDUPTHER

## 2020-03-23 RX ORDER — ATORVASTATIN CALCIUM 40 MG/1
40 TABLET, FILM COATED ORAL DAILY
Qty: 90 TABLET | Refills: 1 | Status: SHIPPED | OUTPATIENT
Start: 2020-04-07 | End: 2020-05-01 | Stop reason: SDUPTHER

## 2020-03-23 RX ORDER — LISINOPRIL 5 MG/1
5 TABLET ORAL DAILY
Qty: 90 TABLET | Refills: 1 | Status: SHIPPED | OUTPATIENT
Start: 2020-04-07 | End: 2020-05-01 | Stop reason: SDUPTHER

## 2020-04-24 RX ORDER — CLOPIDOGREL BISULFATE 75 MG/1
75 TABLET ORAL DAILY
Qty: 90 TABLET | Refills: 1 | Status: SHIPPED | OUTPATIENT
Start: 2020-04-24 | End: 2020-08-18

## 2020-04-24 NOTE — TELEPHONE ENCOUNTER
Carole Mendez is calling to request a refill on the following medication(s):    Last Visit Date (If Applicable):  6/18/2752    Next Visit Date:    5/20/2020  Last filled 04/07/2020  Medication Request:  Requested Prescriptions     Pending Prescriptions Disp Refills    clopidogrel (PLAVIX) 75 MG tablet 90 tablet 1     Sig: Take 1 tablet by mouth daily

## 2020-05-01 RX ORDER — METOPROLOL SUCCINATE 50 MG/1
50 TABLET, EXTENDED RELEASE ORAL DAILY
Qty: 90 TABLET | Refills: 1 | Status: SHIPPED | OUTPATIENT
Start: 2020-05-01 | End: 2020-09-15

## 2020-05-01 RX ORDER — PANTOPRAZOLE SODIUM 20 MG/1
20 TABLET, DELAYED RELEASE ORAL
Qty: 90 TABLET | Refills: 1 | Status: SHIPPED | OUTPATIENT
Start: 2020-05-01 | End: 2020-09-08 | Stop reason: SDUPTHER

## 2020-05-01 RX ORDER — GABAPENTIN 100 MG/1
100 CAPSULE ORAL DAILY
Qty: 90 CAPSULE | Refills: 0 | Status: SHIPPED | OUTPATIENT
Start: 2020-05-01 | End: 2020-09-08

## 2020-05-01 RX ORDER — ATORVASTATIN CALCIUM 40 MG/1
40 TABLET, FILM COATED ORAL DAILY
Qty: 90 TABLET | Refills: 1 | Status: SHIPPED | OUTPATIENT
Start: 2020-05-01 | End: 2020-09-08 | Stop reason: SDUPTHER

## 2020-05-01 RX ORDER — LISINOPRIL 5 MG/1
5 TABLET ORAL DAILY
Qty: 90 TABLET | Refills: 1 | Status: SHIPPED | OUTPATIENT
Start: 2020-05-01 | End: 2020-09-08 | Stop reason: SDUPTHER

## 2020-06-01 ENCOUNTER — HOSPITAL ENCOUNTER (OUTPATIENT)
Age: 80
Discharge: HOME | End: 2020-06-01
Payer: MEDICARE

## 2020-06-01 LAB
ALBUMIN SERPL-MCNC: 4.7 G/DL (ref 3.5–5.2)
ALBUMIN/GLOBULIN RATIO: 2.1 (ref 1–2.5)
ALP BLD-CCNC: 77 U/L (ref 35–104)
ALT SERPL-CCNC: 15 U/L (ref 5–33)
ANION GAP SERPL CALCULATED.3IONS-SCNC: 17 MMOL/L (ref 9–17)
AST SERPL-CCNC: 23 U/L
BILIRUB SERPL-MCNC: 0.56 MG/DL (ref 0.3–1.2)
BUN BLDV-MCNC: 36 MG/DL (ref 8–23)
BUN/CREAT BLD: ABNORMAL (ref 9–20)
CALCIUM SERPL-MCNC: 9.9 MG/DL (ref 8.6–10.4)
CHLORIDE BLD-SCNC: 106 MMOL/L (ref 98–107)
CHOLESTEROL/HDL RATIO: 1.8
CHOLESTEROL: 247 MG/DL
CO2: 19 MMOL/L (ref 20–31)
CREAT SERPL-MCNC: 1.03 MG/DL (ref 0.5–0.9)
GFR AFRICAN AMERICAN: >60 ML/MIN
GFR NON-AFRICAN AMERICAN: 52 ML/MIN
GFR SERPL CREATININE-BSD FRML MDRD: ABNORMAL ML/MIN/{1.73_M2}
GFR SERPL CREATININE-BSD FRML MDRD: ABNORMAL ML/MIN/{1.73_M2}
GLUCOSE BLD-MCNC: 100 MG/DL (ref 70–99)
HCT VFR BLD CALC: 40.5 % (ref 36.3–47.1)
HDLC SERPL-MCNC: 140 MG/DL
HEMOGLOBIN: 12.6 G/DL (ref 11.9–15.1)
LDL CHOLESTEROL: 91 MG/DL (ref 0–130)
MCH RBC QN AUTO: 30.7 PG (ref 25.2–33.5)
MCHC RBC AUTO-ENTMCNC: 31.1 G/DL (ref 28.4–34.8)
MCV RBC AUTO: 98.5 FL (ref 82.6–102.9)
NRBC AUTOMATED: 0 PER 100 WBC
PDW BLD-RTO: 12.3 % (ref 11.8–14.4)
PLATELET # BLD: 210 K/UL (ref 138–453)
PMV BLD AUTO: 11.1 FL (ref 8.1–13.5)
POTASSIUM SERPL-SCNC: 4.3 MMOL/L (ref 3.7–5.3)
RBC # BLD: 4.11 M/UL (ref 3.95–5.11)
SODIUM BLD-SCNC: 142 MMOL/L (ref 135–144)
TOTAL PROTEIN: 6.9 G/DL (ref 6.4–8.3)
TRIGL SERPL-MCNC: 79 MG/DL
VITAMIN B-12: 705 PG/ML (ref 232–1245)
VLDLC SERPL CALC-MCNC: ABNORMAL MG/DL (ref 1–30)
WBC # BLD: 5.2 K/UL (ref 3.5–11.3)

## 2020-06-01 PROCEDURE — 82607 VITAMIN B-12: CPT

## 2020-06-01 PROCEDURE — 80053 COMPREHEN METABOLIC PANEL: CPT

## 2020-06-01 PROCEDURE — 36415 COLL VENOUS BLD VENIPUNCTURE: CPT

## 2020-06-01 PROCEDURE — 85027 COMPLETE CBC AUTOMATED: CPT

## 2020-06-01 PROCEDURE — 80061 LIPID PANEL: CPT

## 2020-06-09 ENCOUNTER — OFFICE VISIT (OUTPATIENT)
Dept: FAMILY MEDICINE CLINIC | Age: 80
End: 2020-06-09
Payer: MEDICARE

## 2020-06-09 VITALS
BODY MASS INDEX: 23.01 KG/M2 | DIASTOLIC BLOOD PRESSURE: 67 MMHG | HEART RATE: 55 BPM | OXYGEN SATURATION: 100 % | WEIGHT: 117.2 LBS | TEMPERATURE: 93.2 F | SYSTOLIC BLOOD PRESSURE: 139 MMHG | HEIGHT: 60 IN

## 2020-06-09 PROBLEM — E55.9 VITAMIN D DEFICIENCY: Status: ACTIVE | Noted: 2020-02-18

## 2020-06-09 PROBLEM — Z87.891 EX-SMOKER FOR MORE THAN 1 YEAR: Status: RESOLVED | Noted: 2019-08-15 | Resolved: 2020-06-09

## 2020-06-09 PROBLEM — N18.2 STAGE 2 CHRONIC KIDNEY DISEASE: Status: ACTIVE | Noted: 2019-08-28

## 2020-06-09 PROBLEM — K25.9 GASTRIC ULCER: Status: ACTIVE | Noted: 2020-06-09

## 2020-06-09 PROBLEM — N18.9 CHRONIC PROGRESSIVE RENAL FAILURE: Status: ACTIVE | Noted: 2020-06-09

## 2020-06-09 PROBLEM — K63.5 POLYP OF COLON: Status: ACTIVE | Noted: 2020-06-09

## 2020-06-09 PROBLEM — I10 ESSENTIAL HYPERTENSION: Status: ACTIVE | Noted: 2020-06-09

## 2020-06-09 PROBLEM — D64.9 ANEMIA: Status: ACTIVE | Noted: 2020-06-09

## 2020-06-09 PROBLEM — Z87.11 H/O PEPTIC ULCER: Status: ACTIVE | Noted: 2019-08-15

## 2020-06-09 PROBLEM — K21.9 GASTROESOPHAGEAL REFLUX DISEASE: Status: ACTIVE | Noted: 2019-09-12

## 2020-06-09 PROBLEM — K25.9 GASTRIC ULCER: Status: RESOLVED | Noted: 2020-06-09 | Resolved: 2020-06-09

## 2020-06-09 PROBLEM — K31.A0 INTESTINAL METAPLASIA OF GASTRIC MUCOSA: Status: ACTIVE | Noted: 2020-06-09

## 2020-06-09 PROBLEM — Z86.73 HISTORY OF CEREBROVASCULAR ACCIDENT: Status: ACTIVE | Noted: 2019-08-15

## 2020-06-09 PROBLEM — K22.70 BARRETT'S ESOPHAGUS: Status: ACTIVE | Noted: 2020-06-09

## 2020-06-09 PROBLEM — N28.9 RENAL INSUFFICIENCY, MILD: Status: ACTIVE | Noted: 2020-06-09

## 2020-06-09 PROBLEM — M85.80 OSTEOPENIA: Status: ACTIVE | Noted: 2020-06-09

## 2020-06-09 PROBLEM — M54.12 CERVICAL RADICULOPATHY: Status: ACTIVE | Noted: 2020-06-09

## 2020-06-09 PROCEDURE — 4040F PNEUMOC VAC/ADMIN/RCVD: CPT | Performed by: FAMILY MEDICINE

## 2020-06-09 PROCEDURE — G8420 CALC BMI NORM PARAMETERS: HCPCS | Performed by: FAMILY MEDICINE

## 2020-06-09 PROCEDURE — 1036F TOBACCO NON-USER: CPT | Performed by: FAMILY MEDICINE

## 2020-06-09 PROCEDURE — G8427 DOCREV CUR MEDS BY ELIG CLIN: HCPCS | Performed by: FAMILY MEDICINE

## 2020-06-09 PROCEDURE — 1123F ACP DISCUSS/DSCN MKR DOCD: CPT | Performed by: FAMILY MEDICINE

## 2020-06-09 PROCEDURE — 1090F PRES/ABSN URINE INCON ASSESS: CPT | Performed by: FAMILY MEDICINE

## 2020-06-09 PROCEDURE — G8400 PT W/DXA NO RESULTS DOC: HCPCS | Performed by: FAMILY MEDICINE

## 2020-06-09 PROCEDURE — 99214 OFFICE O/P EST MOD 30 MIN: CPT | Performed by: FAMILY MEDICINE

## 2020-06-09 RX ORDER — PREDNISONE 20 MG/1
20 TABLET ORAL 2 TIMES DAILY
Qty: 10 TABLET | Refills: 0 | Status: SHIPPED | OUTPATIENT
Start: 2020-06-09 | End: 2020-06-14

## 2020-06-09 ASSESSMENT — ENCOUNTER SYMPTOMS
EYE REDNESS: 0
SINUS PRESSURE: 0
COLOR CHANGE: 0
RECTAL PAIN: 0
TROUBLE SWALLOWING: 0
ABDOMINAL PAIN: 0
ANAL BLEEDING: 0
BLOOD IN STOOL: 0
CHEST TIGHTNESS: 0
VOMITING: 0
VOICE CHANGE: 0
DIARRHEA: 0
SHORTNESS OF BREATH: 0
NAUSEA: 0
BACK PAIN: 0
EYE PAIN: 0
CONSTIPATION: 0
ABDOMINAL DISTENTION: 0
EYE DISCHARGE: 0
COUGH: 0

## 2020-06-09 ASSESSMENT — PATIENT HEALTH QUESTIONNAIRE - PHQ9
SUM OF ALL RESPONSES TO PHQ9 QUESTIONS 1 & 2: 0
2. FEELING DOWN, DEPRESSED OR HOPELESS: 0
SUM OF ALL RESPONSES TO PHQ QUESTIONS 1-9: 0
1. LITTLE INTEREST OR PLEASURE IN DOING THINGS: 0
SUM OF ALL RESPONSES TO PHQ QUESTIONS 1-9: 0

## 2020-06-09 NOTE — PROGRESS NOTES
immunocompromised state. Neurological: Negative for dizziness, tremors, seizures, syncope, facial asymmetry, speech difficulty, weakness, light-headedness, numbness and headaches. Hematological: Negative for adenopathy. Does not bruise/bleed easily. Psychiatric/Behavioral: Negative for agitation, behavioral problems, confusion, decreased concentration, sleep disturbance and suicidal ideas. The patient is not nervous/anxious. Objective:   Physical Exam  Constitutional:       Appearance: She is well-developed. HENT:      Head: Normocephalic and atraumatic. Right Ear: External ear normal.      Left Ear: External ear normal.      Nose: Nose normal.      Mouth/Throat:      Pharynx: No oropharyngeal exudate. Eyes:      General: No scleral icterus. Right eye: No discharge. Left eye: No discharge. Conjunctiva/sclera: Conjunctivae normal.      Pupils: Pupils are equal, round, and reactive to light. Neck:      Musculoskeletal: Normal range of motion and neck supple. Thyroid: No thyromegaly. Vascular: No JVD. Trachea: No tracheal deviation. Cardiovascular:      Rate and Rhythm: Normal rate and regular rhythm. Heart sounds: Normal heart sounds. No murmur. No friction rub. No gallop. Pulmonary:      Effort: Pulmonary effort is normal. No respiratory distress. Breath sounds: Normal breath sounds. No wheezing or rales. Chest:      Chest wall: No tenderness. Abdominal:      General: Bowel sounds are normal. There is no distension. Palpations: Abdomen is soft. There is no mass. Tenderness: There is no abdominal tenderness. There is no guarding or rebound. Musculoskeletal: Normal range of motion. General: No tenderness. Lymphadenopathy:      Cervical: No cervical adenopathy. Skin:     General: Skin is warm and dry. Findings: No rash. Neurological:      Mental Status: She is alert and oriented to person, place, and time. as it was recommended by Cardio)       Calcium Carbonate-Vit D-Min (CALCIUM 1200) 9964-5172 MG-UNIT CHEW Take by mouth      gabapentin (NEURONTIN) 100 MG capsule Take 1 capsule by mouth nightly for 90 days. Intended supply: 90 days 90 capsule 0     No facility-administered encounter medications on file as of 6/9/2020.             Katlin Santos MD

## 2020-07-07 ENCOUNTER — HOSPITAL ENCOUNTER (OUTPATIENT)
Dept: MAMMOGRAPHY | Age: 80
Discharge: HOME | End: 2020-07-09
Payer: MEDICARE

## 2020-07-07 PROCEDURE — 77080 DXA BONE DENSITY AXIAL: CPT

## 2020-08-18 RX ORDER — CLOPIDOGREL BISULFATE 75 MG/1
75 TABLET ORAL DAILY
Qty: 90 TABLET | Refills: 3 | Status: SHIPPED | OUTPATIENT
Start: 2020-08-18 | End: 2020-09-08 | Stop reason: SDUPTHER

## 2020-09-02 ENCOUNTER — HOSPITAL ENCOUNTER (OUTPATIENT)
Age: 80
Discharge: HOME | End: 2020-09-02
Payer: MEDICARE

## 2020-09-02 LAB
ANION GAP SERPL CALCULATED.3IONS-SCNC: 13 MMOL/L (ref 9–17)
BUN BLDV-MCNC: 27 MG/DL (ref 8–23)
BUN/CREAT BLD: ABNORMAL (ref 9–20)
CALCIUM SERPL-MCNC: 9.5 MG/DL (ref 8.6–10.4)
CHLORIDE BLD-SCNC: 106 MMOL/L (ref 98–107)
CO2: 22 MMOL/L (ref 20–31)
CREAT SERPL-MCNC: 0.97 MG/DL (ref 0.5–0.9)
GFR AFRICAN AMERICAN: >60 ML/MIN
GFR NON-AFRICAN AMERICAN: 55 ML/MIN
GFR SERPL CREATININE-BSD FRML MDRD: ABNORMAL ML/MIN/{1.73_M2}
GFR SERPL CREATININE-BSD FRML MDRD: ABNORMAL ML/MIN/{1.73_M2}
GLUCOSE BLD-MCNC: 95 MG/DL (ref 70–99)
POTASSIUM SERPL-SCNC: 4.3 MMOL/L (ref 3.7–5.3)
SODIUM BLD-SCNC: 141 MMOL/L (ref 135–144)

## 2020-09-02 PROCEDURE — 80048 BASIC METABOLIC PNL TOTAL CA: CPT

## 2020-09-02 PROCEDURE — 36415 COLL VENOUS BLD VENIPUNCTURE: CPT

## 2020-09-08 ENCOUNTER — OFFICE VISIT (OUTPATIENT)
Dept: FAMILY MEDICINE CLINIC | Age: 80
End: 2020-09-08
Payer: MEDICARE

## 2020-09-08 VITALS
DIASTOLIC BLOOD PRESSURE: 68 MMHG | HEIGHT: 60 IN | BODY MASS INDEX: 22.54 KG/M2 | OXYGEN SATURATION: 100 % | SYSTOLIC BLOOD PRESSURE: 138 MMHG | WEIGHT: 114.8 LBS | TEMPERATURE: 97.2 F | HEART RATE: 69 BPM

## 2020-09-08 PROBLEM — N18.9 CHRONIC PROGRESSIVE RENAL FAILURE: Status: RESOLVED | Noted: 2020-06-09 | Resolved: 2020-09-08

## 2020-09-08 PROBLEM — M81.0 AGE-RELATED OSTEOPOROSIS WITHOUT CURRENT PATHOLOGICAL FRACTURE: Status: ACTIVE | Noted: 2020-09-08

## 2020-09-08 PROBLEM — D64.9 ANEMIA: Status: RESOLVED | Noted: 2020-06-09 | Resolved: 2020-09-08

## 2020-09-08 PROBLEM — G62.9 NEUROPATHY: Status: RESOLVED | Noted: 2019-08-15 | Resolved: 2020-09-08

## 2020-09-08 PROBLEM — E28.319 EARLY MENOPAUSE: Status: ACTIVE | Noted: 2020-09-08

## 2020-09-08 PROCEDURE — 1090F PRES/ABSN URINE INCON ASSESS: CPT | Performed by: FAMILY MEDICINE

## 2020-09-08 PROCEDURE — 1123F ACP DISCUSS/DSCN MKR DOCD: CPT | Performed by: FAMILY MEDICINE

## 2020-09-08 PROCEDURE — G8510 SCR DEP NEG, NO PLAN REQD: HCPCS | Performed by: FAMILY MEDICINE

## 2020-09-08 PROCEDURE — G0008 ADMIN INFLUENZA VIRUS VAC: HCPCS | Performed by: FAMILY MEDICINE

## 2020-09-08 PROCEDURE — G0009 ADMIN PNEUMOCOCCAL VACCINE: HCPCS | Performed by: FAMILY MEDICINE

## 2020-09-08 PROCEDURE — 1036F TOBACCO NON-USER: CPT | Performed by: FAMILY MEDICINE

## 2020-09-08 PROCEDURE — G8399 PT W/DXA RESULTS DOCUMENT: HCPCS | Performed by: FAMILY MEDICINE

## 2020-09-08 PROCEDURE — G8427 DOCREV CUR MEDS BY ELIG CLIN: HCPCS | Performed by: FAMILY MEDICINE

## 2020-09-08 PROCEDURE — G8420 CALC BMI NORM PARAMETERS: HCPCS | Performed by: FAMILY MEDICINE

## 2020-09-08 PROCEDURE — 4040F PNEUMOC VAC/ADMIN/RCVD: CPT | Performed by: FAMILY MEDICINE

## 2020-09-08 PROCEDURE — 90694 VACC AIIV4 NO PRSRV 0.5ML IM: CPT | Performed by: FAMILY MEDICINE

## 2020-09-08 PROCEDURE — 90732 PPSV23 VACC 2 YRS+ SUBQ/IM: CPT | Performed by: FAMILY MEDICINE

## 2020-09-08 PROCEDURE — 3288F FALL RISK ASSESSMENT DOCD: CPT | Performed by: FAMILY MEDICINE

## 2020-09-08 PROCEDURE — 99214 OFFICE O/P EST MOD 30 MIN: CPT | Performed by: FAMILY MEDICINE

## 2020-09-08 RX ORDER — CLOPIDOGREL BISULFATE 75 MG/1
75 TABLET ORAL DAILY
Qty: 90 TABLET | Refills: 3 | Status: SHIPPED | OUTPATIENT
Start: 2020-09-08 | End: 2020-09-09 | Stop reason: SDUPTHER

## 2020-09-08 RX ORDER — ATORVASTATIN CALCIUM 40 MG/1
40 TABLET, FILM COATED ORAL DAILY
Qty: 90 TABLET | Refills: 1 | Status: SHIPPED | OUTPATIENT
Start: 2020-09-08 | End: 2020-09-09 | Stop reason: SDUPTHER

## 2020-09-08 RX ORDER — LISINOPRIL 5 MG/1
5 TABLET ORAL DAILY
Qty: 90 TABLET | Refills: 1 | Status: SHIPPED | OUTPATIENT
Start: 2020-09-08 | End: 2020-09-09 | Stop reason: SDUPTHER

## 2020-09-08 RX ORDER — PANTOPRAZOLE SODIUM 20 MG/1
20 TABLET, DELAYED RELEASE ORAL DAILY
Qty: 90 TABLET | Refills: 1 | Status: SHIPPED | OUTPATIENT
Start: 2020-09-08 | End: 2020-09-09 | Stop reason: SDUPTHER

## 2020-09-08 ASSESSMENT — ENCOUNTER SYMPTOMS
COUGH: 0
BACK PAIN: 0
NAUSEA: 0
BLOOD IN STOOL: 0
EYE PAIN: 0
TROUBLE SWALLOWING: 0
SINUS PRESSURE: 0
ABDOMINAL DISTENTION: 0
DIARRHEA: 0
EYE DISCHARGE: 0
CONSTIPATION: 0
VOMITING: 0
VOICE CHANGE: 0
COLOR CHANGE: 0
ABDOMINAL PAIN: 0
EYE REDNESS: 0
RECTAL PAIN: 0
CHEST TIGHTNESS: 0
SHORTNESS OF BREATH: 0
ANAL BLEEDING: 0

## 2020-09-08 ASSESSMENT — PATIENT HEALTH QUESTIONNAIRE - PHQ9
1. LITTLE INTEREST OR PLEASURE IN DOING THINGS: 0
SUM OF ALL RESPONSES TO PHQ9 QUESTIONS 1 & 2: 0
2. FEELING DOWN, DEPRESSED OR HOPELESS: 0
SUM OF ALL RESPONSES TO PHQ QUESTIONS 1-9: 0
SUM OF ALL RESPONSES TO PHQ QUESTIONS 1-9: 0

## 2020-09-08 NOTE — PROGRESS NOTES
Subjective:      Patient ID: Billy Mathews is a 79 y.o. female. HPI Here for a recheck.  has chronic back issues and sometimes pain radiating to right groin andlateral upper thigh, sx B/L but R>L.  seen back specialist in [place] in th epast and was advised Surgery as last resort. States mood, sleep appetite ok, bowels and bladder are ok as well. Had BMP done, Creat better and GFR too after stopping neurontin. States her neuropathy sx for which   she was taking it not there anymore , so glad she stopped. Her DEXA shows Osteoporosis- will arrange for Prolia     Review of Systems   Constitutional: Negative for activity change, appetite change and fatigue. HENT: Negative for dental problem, ear pain, hearing loss, postnasal drip, sinus pressure, sneezing, tinnitus, trouble swallowing and voice change. Eyes: Negative for pain, discharge, redness and visual disturbance. Respiratory: Negative for cough, chest tightness and shortness of breath. Cardiovascular: Negative for chest pain, palpitations and leg swelling. Gastrointestinal: Negative for abdominal distention, abdominal pain, anal bleeding, blood in stool, constipation, diarrhea, nausea, rectal pain and vomiting. Endocrine: Negative for cold intolerance, heat intolerance, polydipsia, polyphagia and polyuria. Genitourinary: Negative for decreased urine volume, difficulty urinating, dyspareunia, dysuria, enuresis, flank pain, frequency, genital sores, hematuria, menstrual problem, pelvic pain, urgency, vaginal bleeding and vaginal discharge. Musculoskeletal: Negative for arthralgias, back pain, gait problem, joint swelling, myalgias, neck pain and neck stiffness. Skin: Negative for color change, pallor and rash. Allergic/Immunologic: Negative for environmental allergies, food allergies and immunocompromised state.    Neurological: Negative for dizziness, tremors, seizures, syncope, facial asymmetry, speech difficulty, weakness, light-headedness, numbness and headaches. Hematological: Negative for adenopathy. Does not bruise/bleed easily. Psychiatric/Behavioral: Negative for agitation, behavioral problems, confusion, decreased concentration, sleep disturbance and suicidal ideas. The patient is not nervous/anxious. Objective:   Physical Exam  Constitutional:       General: She is not in acute distress. HENT:      Head: Normocephalic and atraumatic. Right Ear: External ear normal.      Left Ear: External ear normal.   Eyes:      Conjunctiva/sclera: Conjunctivae normal.      Pupils: Pupils are equal, round, and reactive to light. Neck:      Musculoskeletal: Normal range of motion. Thyroid: No thyromegaly. Trachea: No tracheal deviation. Cardiovascular:      Rate and Rhythm: Normal rate and regular rhythm. Heart sounds: No murmur. No friction rub. No gallop. Pulmonary:      Effort: No respiratory distress. Breath sounds: No stridor. No wheezing or rales. Chest:      Chest wall: No tenderness. Abdominal:      General: Bowel sounds are normal. There is no distension. Palpations: Abdomen is soft. Tenderness: There is no abdominal tenderness. There is no rebound. Musculoskeletal: Normal range of motion. Lymphadenopathy:      Cervical: No cervical adenopathy. Skin:     General: Skin is warm. Coloration: Skin is not pale. Findings: No erythema or rash. Neurological:      Mental Status: She is alert and oriented to person, place, and time. Cranial Nerves: No cranial nerve deficit. Motor: No abnormal muscle tone. Deep Tendon Reflexes: Reflexes normal.         Assessment:      Diagnosis Orders   1. Need for pneumococcal vaccination  Pneumococcal polysaccharide vaccine 23-valent greater than or equal to 3yo subcutaneous/IM   2.  Need for influenza vaccination  INFLUENZA, QUADV, ADJUVANTED, 65 YRS =, IM, PF, PREFILL SYR, 0.5ML (FLUAD)   3. Low back pain, unspecified (PRINIVIL;ZESTRIL) 5 MG tablet Take 1 tablet by mouth daily 90 tablet 1    atorvastatin (LIPITOR) 40 MG tablet Take 1 tablet by mouth daily 90 tablet 1    metoprolol succinate (TOPROL XL) 50 MG extended release tablet Take 1 tablet by mouth daily 90 tablet 1    Cyanocobalamin (B-12) 1000 MCG SUBL Place 1 tablet under the tongue daily 90 tablet 5    BABY ASPIRIN PO Take by mouth Indications: take 3 times a week ( she stopped and I have asked she resume as it was recommended by Cardio)       Calcium Carbonate-Vit D-Min (CALCIUM 1200) 5748-6658 MG-UNIT CHEW Take by mouth      [DISCONTINUED] clopidogrel (PLAVIX) 75 MG tablet TAKE 1 TABLET BY MOUTH  DAILY 90 tablet 3    [DISCONTINUED] atorvastatin (LIPITOR) 40 MG tablet Take 1 tablet by mouth daily 90 tablet 1    [DISCONTINUED] gabapentin (NEURONTIN) 100 MG capsule Take 1 capsule by mouth daily for 90 days. 90 capsule 0    [DISCONTINUED] lisinopril (PRINIVIL;ZESTRIL) 5 MG tablet Take 1 tablet by mouth daily 90 tablet 1    [DISCONTINUED] pantoprazole (PROTONIX) 20 MG tablet Take 1 tablet by mouth every morning (before breakfast) 90 tablet 1    [DISCONTINUED] gabapentin (NEURONTIN) 100 MG capsule Take 1 capsule by mouth nightly for 90 days. Intended supply: 90 days 90 capsule 0     No facility-administered encounter medications on file as of 9/8/2020.             Ita Vaca MD

## 2020-09-09 RX ORDER — ATORVASTATIN CALCIUM 40 MG/1
40 TABLET, FILM COATED ORAL DAILY
Qty: 90 TABLET | Refills: 1 | Status: SHIPPED | OUTPATIENT
Start: 2020-09-09 | End: 2020-12-24 | Stop reason: SDUPTHER

## 2020-09-09 RX ORDER — CLOPIDOGREL BISULFATE 75 MG/1
75 TABLET ORAL DAILY
Qty: 90 TABLET | Refills: 3 | Status: SHIPPED | OUTPATIENT
Start: 2020-09-09 | End: 2021-07-20

## 2020-09-09 RX ORDER — LISINOPRIL 5 MG/1
5 TABLET ORAL DAILY
Qty: 90 TABLET | Refills: 1 | Status: SHIPPED | OUTPATIENT
Start: 2020-09-09 | End: 2020-12-16 | Stop reason: DRUGHIGH

## 2020-09-09 RX ORDER — PANTOPRAZOLE SODIUM 20 MG/1
20 TABLET, DELAYED RELEASE ORAL DAILY
Qty: 90 TABLET | Refills: 1 | Status: SHIPPED | OUTPATIENT
Start: 2020-09-09 | End: 2021-01-25

## 2020-09-09 NOTE — TELEPHONE ENCOUNTER
Went to the wrong pharmacy pt didnt say to change her pharmacy when she was here   Arden Costa is calling to request a refill on the following medication(s):    Last Visit Date (If Applicable):  0/1/7656    Next Visit Date:    12/10/2020    Medication Request:  Requested Prescriptions     Pending Prescriptions Disp Refills    atorvastatin (LIPITOR) 40 MG tablet 90 tablet 1     Sig: Take 1 tablet by mouth daily    clopidogrel (PLAVIX) 75 MG tablet 90 tablet 3     Sig: Take 1 tablet by mouth daily    lisinopril (PRINIVIL;ZESTRIL) 5 MG tablet 90 tablet 1     Sig: Take 1 tablet by mouth daily    pantoprazole (PROTONIX) 20 MG tablet 90 tablet 1     Sig: Take 1 tablet by mouth daily

## 2020-09-14 ENCOUNTER — HOSPITAL ENCOUNTER (OUTPATIENT)
Age: 80
Discharge: HOME | End: 2020-09-16
Payer: MEDICARE

## 2020-09-14 ENCOUNTER — HOSPITAL ENCOUNTER (OUTPATIENT)
Dept: GENERAL RADIOLOGY | Age: 80
Discharge: HOME | End: 2020-09-16
Payer: MEDICARE

## 2020-09-14 PROCEDURE — 72100 X-RAY EXAM L-S SPINE 2/3 VWS: CPT

## 2020-09-17 ENCOUNTER — HOSPITAL ENCOUNTER (OUTPATIENT)
Dept: PHYSICAL THERAPY | Facility: CLINIC | Age: 80
Setting detail: THERAPIES SERIES
Discharge: HOME | End: 2020-09-17
Payer: MEDICARE

## 2020-09-17 PROCEDURE — 97161 PT EVAL LOW COMPLEX 20 MIN: CPT

## 2020-09-17 NOTE — CONSULTS
[] [facility]) [facility] CENTER &  Therapy  [address]  [phone]  F: [phone] [x] [address]  [facility] [address]   [address]  P: [phone]  F: [phone] [] [address]  [address]  P: [phone]  F: [phone] [] [address]  [facility]   [address]   [place]: [phone]  F: [phone]      Physical Therapy Spine Evaluation    Date:  2020  Patient: Jazmine Fernandez  : [id number]  MRN: [id number]  Physician: Lakisha Saldana MD   Insurance: [facility] [employer]  Medical Diagnosis: LBP  Rehab Codes: 12/10/2020  Onset Date: chronic                Next 's appt. : tbd    Subjective:   CC/HPI: (onset date) Pt is a 79 y.o. female who reports a history of chronic Low back pain. She reports a previous history of falling on a concrete floor when she was in her 30's and she was in traction for 1 week. She reports since this incident she  has continued to have ongoing issues with her back. She has had physical therapy in the past past and pain management which she reports was not effective. She denies any recent falls. Her cc is central LBP and R groin pain. Aggravating factors include sit to stand and going up and down stairs. She denies any pain when sitting and has mild pain when walking. PMHx: [] Unremarkable [] Diabetes [x] HTN  [] Pacemaker   [] MI/Heart Problems [] Cancer [] Arthritis [x] Other: chronic kidney disease, osteoporosis              [x] Refer to full medical chart  In EPIC       Comorbidities:   [] Obesity [] Dialysis  [] Other:   [] Asthma/COPD [] Dementia [] Other:   [] Stroke [] Sleep apnea [] Other:   [] Vascular disease [] Rheumatic disease [] Other:     Tests: [x] X-Ray:  THREE XRAY VIEWS OF THE LUMBAR SPINE         2020 11:28 am         COMPARISON:    None.      HISTORY:    ORDERING SYSTEM PROVIDED HISTORY: Low back pain, unspecified back pain    laterality, unspecified chronicity, unspecified whether sciatica present    TECHNOLOGIST PROVIDED HISTORY:    Reason for Exam: lower back pain    Acuity: Unknown    Type of Exam: Unknown         FINDINGS:    Bones are grossly demineralized limiting evaluation.  5 lumbar type vertebral    bodies are noted.  Vertebral body heights appear well maintained.  Scattered    endplate and facet joint degenerative changes.  Severe disc space narrowing    L5-S1.  SI joints demonstrate mild degenerative change.              Impression    1. Severe disc space narrowing L5-S1.       [] MRI:  [] Other:    Medications: [x] Refer to full medical record [] None [] Other:  Allergies:      [x] Refer to full medical record [] None [] Other:    Function:  Hand Dominance  [] Right  [] Left       pain  yes   location [place] low back/R hip   current: 0-10  5/10   pain at worst  7-8/10   pain type  aching   what makes pain worse  walking, forward bending   what makes pain better  heat   better/worse/same  worse   disturbed sleep? yes         Objective:      STRENGTH  STRENGTH  ROM    Left Right  Left Right Cervical    C5 Shld Abd   L1-2 Hip Flex  5 Flexion    Shld Flexion   Hip Abd  4+ Extension    Shld IR   L3-4 Knee Ext  5 Rotation L R   Shld ER   L4 Ankle DF  5 Sidebend L R   C6 Elb Flex   L5 EHL  5 Retraction    C7 Elb Ext   S1 Plant. Flex  5 Lumbar    C8 EPL   Abdominals   Flexion 90   T1 Fing Abd   Erector Spinae   Extension 30         Rotation L WFL R  WFL         Sidebend L 35 R 25         UE/LE                                                              TESTS (+/-) LEFT RIGHT Not Tested   SLR [x] sit [x] supine neg neg []   Hamstring (SLR)   []   SKTC   []   DKTC   []   Slump/Dural   []   SI JT   []   SONA   []   Joint Mobility   []   Cerv. Comp   []   Cerv. Distraction   []   Cerv.  Alar/Transverse   []   Vertebral Artery   [] Breana   []   Mathew Baron   []   Neo Tests ? Pain ? Pain No Change Not Tested   RFIS [] [] [] [x]   SALVADOR [] [] [] [x]   RFIL [] [] [] [x]   REIL [] [] [] [x]   Rep Prot [] [] [] [x]   Rep Retract [] [] [] [x]       OBSERVATION No Deficit Deficit Not Tested Comments   Posture       Forward Head [] [] []    Rounded Shoulders [] [] []    Kyphosis [] [] []    Lordosis [] [] []    Lateral Shift [] [] []    Scoliosis [] [] []    Iliac Crest [] [] []    PSIS [] [] []    ASIS [] [] []    Genu Valgus [] [] []    Genu Varus [] [] []    Genu Recurvatum [] [] []    Pronation [] [] []    Supination [] [] []    Leg Length Discrp [] [] []    Slumped Sitting [] [] []    Palpation [] [x] [] B SIJ   Sensation [] [] []    Edema [] [] []    Neurological [] [] []        Functional Test: Oswestry  Score: 19/50     Comments: I had a long discussion with the patient regarding her back pain and R hip pain. She expressed her concern that in the past she has tried physical therapy and it did not help, in fact she stated she felt worse following the exercises. We discussed the potential benefits of aquatic physical therapy. She was agreeable to this and I will submit a request for aquatic PT to Dr Clay iY    Assessment:     Problems:    [x] ? Pain: central LBP/R hip pain-intermittent-moderate intensity  [x] ? Function: Oswestry score 19/50: aggravated with bending and lifting    Treatment goals: To be set by Aquatic PT     Patient goals:hope to alleviate some pain    Rehab Potential:  [] Good  [x] Fair  [] Poor   Suggested Professional Referral:  [x] No  [] Yes:  Barriers to Goal Achievement:  [] No  [x] Yes: Does not feel PT will help her based on previous experience  Domestic Concerns:  [x] No  [] Yes:    Pt. Education:  [x] Plans/Goals, Risks/Benefits discussed  [] Home exercise program  Method of Education: [] Verbal  [] Demo  [] Written  Comprehension of Education:  [x] Verbalizes understanding. [] Demonstrates understanding.   [] Needs Review. [] Demonstrates/verbalizes understanding of HEP/Ed previously given. Treatment Plan:  [] Therapeutic Exercise   50751  [] Iontophoresis: 4 mg/mL Dexamethasone Sodium Phosphate  mAmin  85296   [] Therapeutic Activity  17109 [] Vasopneumatic cold with compression  32744    [] Gait Training   17822 [] Ultrasound   92724   [] Neuromuscular Re-education  75646 [] Electrical Stimulation Unattended  64516   [] Manual Therapy  94648 [] Electrical Stimulation Attended  36240   [] Instruction in HEP  [] Lumbar/Cervical Traction  25210   [x] Aquatic Therapy   06025 [] Cold/hotpack    [] Massage   35114      [] Dry Needling, 1 or 2 muscles  67699   [] Biofeedback, first 15 minutes   66799  [] Biofeedback, additional 15 minutes   28570 [] Dry Needling, 3 or more muscles  94736     []  Medication allergies reviewed for use of    Dexamethasone Sodium Phosphate 4mg/ml     with iontophoresis treatments. Pt is not allergic.     Frequency:  2 x/week for 4-6 wks    Todays Treatment:  PT Evaluation    Specific Instructions for next treatment: Recommend Aquatic physical therapy      Evaluation Complexity:  History (Personal factors, comorbidities) [] 0 [x] 1-2 [] 3+   Exam (limitations, restrictions) [x] 1-2 [] 3 [] 4+   Clinical presentation (progression) [x] Stable [] Evolving  [] Unstable   Decision Making [x] Low [] Moderate [] High    [x] Low Complexity [] Moderate Complexity [] High Complexity       Treatment Charges: Mins Units   [x] Evaluation       [x]  Low       []  Moderate       []  High 50 1   []  Modalities     []  Ther Exercise     []  Manual Therapy     []  Ther Activities     []  Aquatics     []  Vasocompression     []  Other     Medicare total used thru 09/17/20   $ 82.82    TOTAL TREATMENT TIME: 50min    Time in: 10a      Time out: 10:50a    Electronically signed by: Judith Crane, PT        Physician Signature:________________________________Date:__________________  By signing above or cosigning this note, I have reviewed this plan of care and certify a need for medically necessary rehabilitation services.      *PLEASE SIGN ABOVE AND FAX BACK ALL PAGES*

## 2020-09-17 NOTE — CONSULTS
[] [facility]) [facility] CENTER &  Therapy  [address]  [phone]  F: [phone] [x] [address]  [facility]   [address]  P: [phone]  F: [phone] [] [place]  [address]  P: [phone]  F: [phone] [] [address]  [address] [facility]   [address]   [place]: [phone]  F: [phone]        Physical Therapy Plan of Care    Date:  2020  Patient: Olivia Nelson  : [id number]  MRN: [id number]  Physician: Wojciech Holman MD              Insurance: [facility] Med[employer]  Medical Diagnosis: LBP                   Rehab Codes: 12/10/2020  Onset Date: chronic                            Next 's appt. : tbd     Subjective:   CC/HPI: (onset date) Pt is a 79 y.o. female who reports a history of chronic Low back pain. She reports a previous history of falling on a concrete floor when she was in her 30's and she was in traction for 1 week. She reports since this incident she  has continued to have ongoing issues with her back. She has had physical therapy in the past past and pain management which she reports was not effective. She denies any recent falls. Her cc is central LBP and R groin pain. Aggravating factors include sit to stand and going up and down stairs. She denies any pain when sitting and has mild pain when walking. Assessment:  Problems:    [x]? ? Pain: central LBP/R hip pain-intermittent-moderate intensity  [x]? ? Function: Oswestry score 19/50: aggravated with bending and lifting    pain  yes   location [place] low back/R hip   current: 0-10  5/10   pain at worst  7-8/10   pain type  aching   what makes pain worse  walking, forward bending   what makes pain better  heat   better/worse/same  worse   disturbed sleep? yes       Treatment goals:  To be set by Aquatic PT    Comments: I had a long discussion with the patient regarding her back pain and R hip pain. She expressed her concern that in the past she has tried physical therapy and it did not help, in fact she stated she felt worse following the exercises. We discussed the potential benefits of aquatic physical therapy. She was agreeable to this and I will submit a request for aquatic PT to Dr Sam Lorenz to alleviate some pain       Treatment Plan:  [] Therapeutic Exercise   42207  [] Iontophoresis: 4 mg/mL Dexamethasone Sodium Phosphate  mAmin  75804   [] Therapeutic Activity  41034 [] Vasopneumatic cold with compression  14779    [] Gait Training   70513 [] Ultrasound   74977   [] Neuromuscular Re-education  18977 [] Electrical Stimulation Unattended  37610   [] Manual Therapy  64700 [] Electrical Stimulation Attended  20003   [] Instruction in HEP  [] Lumbar/Cervical Traction  20932   [x] Aquatic Therapy   84946 [] Cold/hotpack    [] Massage   74846      [] Dry Needling, 1 or 2 muscles  46466   [] Biofeedback, first 15 minutes   24250  [] Biofeedback, additional 15 minutes   27770 [] Dry Needling, 3 or more muscles  28086     []  Medication allergies reviewed for use of    Dexamethasone Sodium Phosphate 4mg/ml     with iontophoresis treatments. Pt is not allergic. Frequency:  2 x/week for 4-6 wks    Electronically signed by: Shiv Still PT        Physician Signature:________________________________Date:__________________  By signing above or cosigning this note, I have reviewed this plan of care and certify a need for medically necessary rehabilitation services.      *PLEASE SIGN ABOVE AND FAX BACK ALL PAGES*

## 2020-10-27 ENCOUNTER — HOSPITAL ENCOUNTER (OUTPATIENT)
Age: 80
Discharge: HOME | End: 2020-10-27
Payer: MEDICARE

## 2020-10-27 LAB
ALBUMIN SERPL-MCNC: 4.8 G/DL (ref 3.5–5.2)
ALBUMIN/GLOBULIN RATIO: 2.5 (ref 1–2.5)
ALP BLD-CCNC: 75 U/L (ref 35–104)
ALT SERPL-CCNC: 13 U/L (ref 5–33)
ANION GAP SERPL CALCULATED.3IONS-SCNC: 17 MMOL/L (ref 9–17)
AST SERPL-CCNC: 20 U/L
BILIRUB SERPL-MCNC: 0.78 MG/DL (ref 0.3–1.2)
BUN BLDV-MCNC: 26 MG/DL (ref 8–23)
BUN/CREAT BLD: ABNORMAL (ref 9–20)
CALCIUM SERPL-MCNC: 9.5 MG/DL (ref 8.6–10.4)
CHLORIDE BLD-SCNC: 105 MMOL/L (ref 98–107)
CHOLESTEROL/HDL RATIO: 2.1
CHOLESTEROL: 247 MG/DL
CO2: 20 MMOL/L (ref 20–31)
CREAT SERPL-MCNC: 1.03 MG/DL (ref 0.5–0.9)
ESTIMATED AVERAGE GLUCOSE: 105 MG/DL
FOLATE: 8.4 NG/ML
GFR AFRICAN AMERICAN: >60 ML/MIN
GFR NON-AFRICAN AMERICAN: 52 ML/MIN
GFR SERPL CREATININE-BSD FRML MDRD: ABNORMAL ML/MIN/{1.73_M2}
GFR SERPL CREATININE-BSD FRML MDRD: ABNORMAL ML/MIN/{1.73_M2}
GLUCOSE BLD-MCNC: 91 MG/DL (ref 70–99)
HBA1C MFR BLD: 5.3 % (ref 4–6)
HCT VFR BLD CALC: 39.1 % (ref 36.3–47.1)
HDLC SERPL-MCNC: 120 MG/DL
HEMOGLOBIN: 12.7 G/DL (ref 11.9–15.1)
LDL CHOLESTEROL: 108 MG/DL (ref 0–130)
MCH RBC QN AUTO: 31.1 PG (ref 25.2–33.5)
MCHC RBC AUTO-ENTMCNC: 32.5 G/DL (ref 28.4–34.8)
MCV RBC AUTO: 95.6 FL (ref 82.6–102.9)
NRBC AUTOMATED: 0 PER 100 WBC
PDW BLD-RTO: 12.5 % (ref 11.8–14.4)
PLATELET # BLD: 210 K/UL (ref 138–453)
PMV BLD AUTO: 10.6 FL (ref 8.1–13.5)
POTASSIUM SERPL-SCNC: 3.9 MMOL/L (ref 3.7–5.3)
RBC # BLD: 4.09 M/UL (ref 3.95–5.11)
SODIUM BLD-SCNC: 142 MMOL/L (ref 135–144)
TOTAL PROTEIN: 6.7 G/DL (ref 6.4–8.3)
TRIGL SERPL-MCNC: 95 MG/DL
TSH SERPL DL<=0.05 MIU/L-ACNC: 0.65 MIU/L (ref 0.3–5)
VITAMIN B-12: 463 PG/ML (ref 232–1245)
VITAMIN D 25-HYDROXY: 32.4 NG/ML (ref 30–100)
VLDLC SERPL CALC-MCNC: ABNORMAL MG/DL (ref 1–30)
WBC # BLD: 4.7 K/UL (ref 3.5–11.3)

## 2020-10-27 PROCEDURE — 82746 ASSAY OF FOLIC ACID SERUM: CPT

## 2020-10-27 PROCEDURE — 83036 HEMOGLOBIN GLYCOSYLATED A1C: CPT

## 2020-10-27 PROCEDURE — 85027 COMPLETE CBC AUTOMATED: CPT

## 2020-10-27 PROCEDURE — 82607 VITAMIN B-12: CPT

## 2020-10-27 PROCEDURE — 80061 LIPID PANEL: CPT

## 2020-10-27 PROCEDURE — 36415 COLL VENOUS BLD VENIPUNCTURE: CPT

## 2020-10-27 PROCEDURE — 80053 COMPREHEN METABOLIC PANEL: CPT

## 2020-10-27 PROCEDURE — 82306 VITAMIN D 25 HYDROXY: CPT

## 2020-10-27 PROCEDURE — 84443 ASSAY THYROID STIM HORMONE: CPT

## 2020-10-29 ENCOUNTER — TELEPHONE (OUTPATIENT)
Dept: FAMILY MEDICINE CLINIC | Age: 80
End: 2020-10-29

## 2020-10-29 PROBLEM — M81.0 SENILE OSTEOPOROSIS: Status: ACTIVE | Noted: 2020-09-11

## 2020-10-29 NOTE — TELEPHONE ENCOUNTER
Pt called and stated that she saw her labs and wanted you to take a look at them and also that she has not heard from the gastro doctor yet

## 2020-11-02 RX ORDER — METOPROLOL SUCCINATE 50 MG/1
50 TABLET, EXTENDED RELEASE ORAL DAILY
Qty: 90 TABLET | Refills: 3 | Status: SHIPPED | OUTPATIENT
Start: 2020-11-02 | End: 2020-12-16 | Stop reason: SDUPTHER

## 2020-11-03 PROBLEM — K21.9 GERD (GASTROESOPHAGEAL REFLUX DISEASE): Status: RESOLVED | Noted: 2019-09-12 | Resolved: 2020-11-03

## 2020-11-03 PROBLEM — I10 HYPERTENSION: Status: RESOLVED | Noted: 2020-02-18 | Resolved: 2020-11-03

## 2020-12-08 ENCOUNTER — TELEPHONE (OUTPATIENT)
Dept: FAMILY MEDICINE CLINIC | Age: 80
End: 2020-12-08

## 2020-12-08 PROBLEM — K63.5 POLYP OF COLON: Status: RESOLVED | Noted: 2020-06-09 | Resolved: 2020-12-08

## 2020-12-08 NOTE — TELEPHONE ENCOUNTER
Pt was unable to get her MyChart to work referred her to UC to evaluate why her feet were turning blue.  She will f/u with you

## 2020-12-09 ENCOUNTER — HOSPITAL ENCOUNTER (EMERGENCY)
Facility: CLINIC | Age: 80
Discharge: HOME | End: 2020-12-09
Attending: EMERGENCY MEDICINE
Payer: MEDICARE

## 2020-12-09 VITALS
TEMPERATURE: 98.2 F | RESPIRATION RATE: 18 BRPM | SYSTOLIC BLOOD PRESSURE: 170 MMHG | HEART RATE: 68 BPM | WEIGHT: 114 LBS | HEIGHT: 60 IN | OXYGEN SATURATION: 100 % | DIASTOLIC BLOOD PRESSURE: 81 MMHG | BODY MASS INDEX: 22.38 KG/M2

## 2020-12-09 LAB
ABSOLUTE EOS #: 0 K/UL (ref 0–0.4)
ABSOLUTE IMMATURE GRANULOCYTE: ABNORMAL K/UL (ref 0–0.3)
ABSOLUTE LYMPH #: 0.8 K/UL (ref 1–4.8)
ABSOLUTE MONO #: 0.4 K/UL (ref 0.1–1.2)
ANION GAP SERPL CALCULATED.3IONS-SCNC: 12 MMOL/L (ref 9–17)
BASOPHILS # BLD: 1 % (ref 0–2)
BASOPHILS ABSOLUTE: 0 K/UL (ref 0–0.2)
BUN BLDV-MCNC: 22 MG/DL (ref 8–23)
BUN/CREAT BLD: ABNORMAL (ref 9–20)
CALCIUM SERPL-MCNC: 9.7 MG/DL (ref 8.6–10.4)
CHLORIDE BLD-SCNC: 107 MMOL/L (ref 98–107)
CO2: 23 MMOL/L (ref 20–31)
CREAT SERPL-MCNC: 1 MG/DL (ref 0.5–0.9)
DIFFERENTIAL TYPE: ABNORMAL
EOSINOPHILS RELATIVE PERCENT: 1 % (ref 1–4)
GFR AFRICAN AMERICAN: >60 ML/MIN
GFR NON-AFRICAN AMERICAN: 53 ML/MIN
GFR SERPL CREATININE-BSD FRML MDRD: ABNORMAL ML/MIN/{1.73_M2}
GFR SERPL CREATININE-BSD FRML MDRD: ABNORMAL ML/MIN/{1.73_M2}
GLUCOSE BLD-MCNC: 95 MG/DL (ref 70–99)
HCT VFR BLD CALC: 34.6 % (ref 36–46)
HEMOGLOBIN: 11.3 G/DL (ref 12–16)
IMMATURE GRANULOCYTES: ABNORMAL %
LYMPHOCYTES # BLD: 18 % (ref 24–44)
MCH RBC QN AUTO: 31.1 PG (ref 26–34)
MCHC RBC AUTO-ENTMCNC: 32.6 G/DL (ref 31–37)
MCV RBC AUTO: 95.6 FL (ref 80–100)
MONOCYTES # BLD: 9 % (ref 2–11)
NRBC AUTOMATED: ABNORMAL PER 100 WBC
PDW BLD-RTO: 13 % (ref 12.5–15.4)
PLATELET # BLD: 173 K/UL (ref 140–450)
PLATELET ESTIMATE: ABNORMAL
PMV BLD AUTO: 7.9 FL (ref 6–12)
POTASSIUM SERPL-SCNC: 3.8 MMOL/L (ref 3.7–5.3)
RBC # BLD: 3.62 M/UL (ref 4–5.2)
RBC # BLD: ABNORMAL 10*6/UL
SEG NEUTROPHILS: 71 % (ref 36–66)
SEGMENTED NEUTROPHILS ABSOLUTE COUNT: 3 K/UL (ref 1.8–7.7)
SODIUM BLD-SCNC: 142 MMOL/L (ref 135–144)
TROPONIN INTERP: ABNORMAL
TROPONIN INTERP: ABNORMAL
TROPONIN T: ABNORMAL NG/ML
TROPONIN T: ABNORMAL NG/ML
TROPONIN, HIGH SENSITIVITY: 19 NG/L (ref 0–14)
TROPONIN, HIGH SENSITIVITY: 21 NG/L (ref 0–14)
WBC # BLD: 4.2 K/UL (ref 3.5–11)
WBC # BLD: ABNORMAL 10*3/UL

## 2020-12-09 PROCEDURE — 99284 EMERGENCY DEPT VISIT MOD MDM: CPT

## 2020-12-09 PROCEDURE — 36415 COLL VENOUS BLD VENIPUNCTURE: CPT

## 2020-12-09 PROCEDURE — 84484 ASSAY OF TROPONIN QUANT: CPT

## 2020-12-09 PROCEDURE — 85025 COMPLETE CBC W/AUTO DIFF WBC: CPT

## 2020-12-09 PROCEDURE — 93005 ELECTROCARDIOGRAM TRACING: CPT | Performed by: EMERGENCY MEDICINE

## 2020-12-09 PROCEDURE — 80048 BASIC METABOLIC PNL TOTAL CA: CPT

## 2020-12-09 RX ORDER — MECLIZINE HYDROCHLORIDE 25 MG/1
25 TABLET ORAL 3 TIMES DAILY PRN
Qty: 30 TABLET | Refills: 0 | Status: SHIPPED | OUTPATIENT
Start: 2020-12-09 | End: 2020-12-16 | Stop reason: DRUGHIGH

## 2020-12-09 RX ORDER — LORAZEPAM 1 MG/1
0.5 TABLET ORAL EVERY 8 HOURS PRN
Qty: 10 TABLET | Refills: 0 | Status: SHIPPED | OUTPATIENT
Start: 2020-12-09 | End: 2020-12-16

## 2020-12-09 NOTE — ED PROVIDER NOTES
[facility]  [facility]  Phone: 13 Mari Sawyer      Pt Name: Rickey Jimenez  MRN: [id number]  [place] 1940  Date of evaluation: 12/9/2020    CHIEF COMPLAINT       Chief Complaint   Patient presents with    Dizziness     intermittent since sunday        HISTORY OF PRESENT ILLNESS    Rickey Jimenez is a 79 y.o. female who presents to the emergency department complaining of dizziness and lightheadedness since Sunday. Patient was in [place] about 4000 feet above [place] level and states that she started to feel dizzy and lightheaded. She was supposed to fly home on Monday but could not because of her symptoms so she went to the emergency department where they did blood work give her fluids Ativan and did a CAT scan of her head which were all negative. She did bring those results for us to review. She felt better and came home. She is scheduled to see her doctor tomorrow and was told to come to the emergency department for further evaluation if she was not getting any better. She tells me she does not have worsening of her dizziness or lightheadedness it just persists. She denies any headache blurry vision or double vision no chest pain or shortness of breath no fevers or chills. Nothing really makes her symptoms better or worse. She does add that she started to get bluish discoloration of her toes when she sits up unless her feet dangle down but when she is walking they do not change color. It is not her entire foot just her toes and that started while she was in [place] as well. When they were up and when she is walking they are normal.  She denies any ringing in her ears she says she feels like she is leaning more towards the left side when she gets the spells.     REVIEW OF SYSTEMS       Constitutional: No fevers or chills positive dizziness and lightheadedness  HEENT: No sore throat, rhinorrhea, or earache   Eyes: No blurry vision or double vision no drainage   Cardiovascular: No chest pain or tachycardia   Respiratory: No wheezing or shortness of breath no cough   Gastrointestinal: No nausea, vomiting, diarrhea, constipation, or abdominal pain   : No hematuria or dysuria   Musculoskeletal: No swelling or pain   Skin: No rash Positive discoloration to her toes occasionally  Neurological: No focal neurologic complaints, paresthesias, weakness, or headache     PAST MEDICAL HISTORY    has a past medical history of Hypertension. SURGICAL HISTORY      has a past surgical history that includes Hysterectomy, total abdominal; Appendectomy; Tonsillectomy; and Cholecystectomy. CURRENT MEDICATIONS       Previous Medications    ATORVASTATIN (LIPITOR) 40 MG TABLET    Take 1 tablet by mouth daily    BABY ASPIRIN PO    Take by mouth Indications: take 3 times a week ( she stopped and I have asked she resume as it was recommended by Cardio)     CALCIUM CARBONATE-VIT D-MIN (CALCIUM 1200) 2533-8796 MG-UNIT CHEW    Take by mouth    CLOPIDOGREL (PLAVIX) 75 MG TABLET    Take 1 tablet by mouth daily    CYANOCOBALAMIN (B-12) 1000 MCG SUBL    Place 1 tablet under the tongue daily    LISINOPRIL (PRINIVIL;ZESTRIL) 5 MG TABLET    Take 1 tablet by mouth daily    METOPROLOL SUCCINATE (TOPROL XL) 50 MG EXTENDED RELEASE TABLET    TAKE 1 TABLET BY MOUTH  DAILY    PANTOPRAZOLE (PROTONIX) 20 MG TABLET    Take 1 tablet by mouth daily       ALLERGIES     is allergic to clarithromycin; levaquin  [levofloxacin in d5w]; levofloxacin; and sertraline. FAMILY HISTORY     She indicated that her mother is . She indicated that her father is . family history is not on file. SOCIAL HISTORY      reports that she quit smoking about 31 years ago. She has a 15.00 pack-year smoking history. She has never used smokeless tobacco. She reports current alcohol use. She reports that she does not use drugs.     PHYSICAL EXAM       ED Triage Vitals [20 1059]   BP Temp Temp Source Pulse Resp SpO2 Height Weight   (!) 168/82 98.2 °F (36.8 °C) Oral 78 18 98 % 5' (1.524 m) 114 lb (51.7 kg)       Constitutional: Alert, oriented x3, nontoxic, answering questions appropriately, acting properly for age, in no acute distress   HEENT: Extraocular muscles intact, mucus membranes moist, TMs clear bilaterally, no posterior pharyngeal erythema or exudates, Pupils equal, round, reactive to light, no nystagmus no effusion behind the TM bilaterally. Neck: Trachea midline no meningismus  Cardiovascular: Regular rhythm and rate no S3, S4, or murmurs   Respiratory: Clear to auscultation bilaterally no wheezes, rhonchi, rales, no respiratory distress no tachypnea no retractions no hypoxia  Gastrointestinal: Soft, nontender, nondistended, positive bowel sounds. No rebound, rigidity, or guarding. Musculoskeletal: No extremity pain or swelling pedal pulses intact and equal bilaterally. Neurologic: Moving all 4 extremities without difficulty there are no gross focal neurologic deficits   Skin: Warm and dry no discoloration of the toes      DIFFERENTIAL DIAGNOSIS/ MDM:     Reviewed CT of the head and labs from Monday which were all normal.  Will repeat labs and obtain an EKG. CT not indicated at this time. DIAGNOSTIC RESULTS     EKG: All EKG's are interpreted by the Emergency Department Physician who either signs or Co-signs this chart in the absence of a cardiologist.    1110 sinus rhythm rate 65  QRS 82  no acute ST or T wave changes. 1320 sinus rhythm rate 65  QRS 84  no acute ST or T wave changes.     Not indicated unless otherwise documented above    LABS:  Results for orders placed or performed during the hospital encounter of 12/09/20   CBC Auto Differential   Result Value Ref Range    WBC 4.2 3.5 - 11.0 k/uL    RBC 3.62 (L) 4.0 - 5.2 m/uL    Hemoglobin 11.3 (L) 12.0 - 16.0 g/dL    Hematocrit 34.6 (L) 36 - 46 %    MCV 95.6 80 - 100 fL    MCH 31.1 26 - 34 pg MCHC 32.6 31 - 37 g/dL    RDW 13.0 12.5 - 15.4 %    Platelets 733 982 - 337 k/uL    MPV 7.9 6.0 - 12.0 fL    NRBC Automated NOT REPORTED per 100 WBC    Differential Type NOT REPORTED     Seg Neutrophils 71 (H) 36 - 66 %    Lymphocytes 18 (L) 24 - 44 %    Monocytes 9 2 - 11 %    Eosinophils % 1 1 - 4 %    Basophils 1 0 - 2 %    Immature Granulocytes NOT REPORTED 0 %    Segs Absolute 3.00 1.8 - 7.7 k/uL    Absolute Lymph # 0.80 (L) 1.0 - 4.8 k/uL    Absolute Mono # 0.40 0.1 - 1.2 k/uL    Absolute Eos # 0.00 0.0 - 0.4 k/uL    Basophils Absolute 0.00 0.0 - 0.2 k/uL    Absolute Immature Granulocyte NOT REPORTED 0.00 - 0.30 k/uL    WBC Morphology NOT REPORTED     RBC Morphology NOT REPORTED     Platelet Estimate NOT REPORTED    Basic Metabolic Panel   Result Value Ref Range    Glucose 95 70 - 99 mg/dL    BUN 22 8 - 23 mg/dL    CREATININE 1.00 (H) 0.50 - 0.90 mg/dL    Bun/Cre Ratio NOT REPORTED 9 - 20    Calcium 9.7 8.6 - 10.4 mg/dL    Sodium 142 135 - 144 mmol/L    Potassium 3.8 3.7 - 5.3 mmol/L    Chloride 107 98 - 107 mmol/L    CO2 23 20 - 31 mmol/L    Anion Gap 12 9 - 17 mmol/L    GFR Non-African American 53 (L) >60 mL/min    GFR African American >60 >60 mL/min    GFR Comment          GFR Staging NOT REPORTED    Troponin   Result Value Ref Range    Troponin, High Sensitivity 19 (H) 0 - 14 ng/L    Troponin T NOT REPORTED <0.03 ng/mL    Troponin Interp NOT REPORTED    Troponin   Result Value Ref Range    Troponin, High Sensitivity 21 (H) 0 - 14 ng/L    Troponin T NOT REPORTED <0.03 ng/mL    Troponin Interp NOT REPORTED        Not indicated unless otherwise documented above    RADIOLOGY:   I reviewed the radiologist interpretations:    No orders to display       Not indicated unless otherwise documented above    EMERGENCY DEPARTMENT COURSE:     The patient was given the following medications:  Orders Placed This Encounter   Medications    meclizine (ANTIVERT) 25 MG tablet     Sig: Take 1 tablet by mouth 3 times daily as needed for Dizziness     Dispense:  30 tablet     Refill:  0    LORazepam (ATIVAN) 1 MG tablet     Sig: Take 0.5 tablets by mouth every 8 hours as needed for Anxiety for up to 30 days. Dispense:  10 tablet     Refill:  0        Vitals:   -------------------------  BP (!) 170/73   Pulse 67   Temp 98.2 °F (36.8 °C) (Oral)   Resp 18   Ht 5' (1.524 m)   Wt 51.7 kg (114 lb)   SpO2 100%   BMI 22.26 kg/m²     12:15 PM first troponin negative awaiting second troponin and EKG. Labs thus far unremarkable. 2 PM second EKG unremarkable second troponin up only 2 points no significant delta change. She is not complaining of any chest pain or shortness of breath. Her symptoms have been ongoing for the past 4 to 5 days. She will be discharged to follow-up with her doctor next week and return if worsening symptoms or other concerns. I will address her anxiety with a prescription for Ativan and her dizziness with Antivert. I have reviewed the disposition diagnosis with the patient and or their family/guardian. I have answered their questions and given discharge instructions. They voiced understanding of these instructions and did not have any furtherquestions or complaints. CRITICAL CARE:    None    CONSULTS:    None    PROCEDURES:    None      OARRS Report if indicated             FINAL IMPRESSION      1. Dizziness          DISPOSITION/PLAN   DISPOSITION Decision To Discharge 12/09/2020 01:59:17 PM        CONDITION ON DISPOSITION: STABLE       PATIENT REFERRED TO:  Pat Link MD  [address] [place] [address]  [phone]    Schedule an appointment as soon as possible for a visit in 1 week        DISCHARGE MEDICATIONS:  New Prescriptions    LORAZEPAM (ATIVAN) 1 MG TABLET    Take 0.5 tablets by mouth every 8 hours as needed for Anxiety for up to 30 days.     MECLIZINE (ANTIVERT) 25 MG TABLET    Take 1 tablet by mouth 3 times daily as needed for Dizziness       (Please note that portions of thisnote were completed with a voice recognition program.  Efforts were made to edit the dictations but occasionally words are mis-transcribed.)    Nohemi Berg,   Attending Emergency Physician        Nohemi Berg DO  12/09/20 2536

## 2020-12-10 LAB
EKG ATRIAL RATE: 65 BPM
EKG ATRIAL RATE: 65 BPM
EKG P AXIS: -3 DEGREES
EKG P AXIS: 5 DEGREES
EKG P-R INTERVAL: 118 MS
EKG P-R INTERVAL: 122 MS
EKG Q-T INTERVAL: 428 MS
EKG Q-T INTERVAL: 428 MS
EKG QRS DURATION: 82 MS
EKG QRS DURATION: 84 MS
EKG QTC CALCULATION (BAZETT): 445 MS
EKG QTC CALCULATION (BAZETT): 445 MS
EKG R AXIS: 2 DEGREES
EKG T AXIS: 44 DEGREES
EKG T AXIS: 44 DEGREES
EKG VENTRICULAR RATE: 65 BPM
EKG VENTRICULAR RATE: 65 BPM

## 2020-12-14 ENCOUNTER — TELEPHONE (OUTPATIENT)
Dept: FAMILY MEDICINE CLINIC | Age: 80
End: 2020-12-14

## 2020-12-14 NOTE — TELEPHONE ENCOUNTER
Ok to squeeze her in some day in am.... not sure how I would feel by afternoon-so earlier the better.   Fatigue is still quite bad for me -so Im thinking seeing as many patients face to face in am and maybe video visits if I cant keep up in the afternoons

## 2020-12-16 ENCOUNTER — OFFICE VISIT (OUTPATIENT)
Dept: FAMILY MEDICINE CLINIC | Age: 80
End: 2020-12-16
Payer: MEDICARE

## 2020-12-16 VITALS
SYSTOLIC BLOOD PRESSURE: 160 MMHG | HEART RATE: 61 BPM | DIASTOLIC BLOOD PRESSURE: 72 MMHG | WEIGHT: 119 LBS | TEMPERATURE: 96.6 F | BODY MASS INDEX: 23.24 KG/M2 | OXYGEN SATURATION: 98 %

## 2020-12-16 PROBLEM — M99.79 NARROWING OF INTERVERTEBRAL DISC SPACE: Status: ACTIVE | Noted: 2020-12-16

## 2020-12-16 PROCEDURE — G8420 CALC BMI NORM PARAMETERS: HCPCS | Performed by: FAMILY MEDICINE

## 2020-12-16 PROCEDURE — G8510 SCR DEP NEG, NO PLAN REQD: HCPCS | Performed by: FAMILY MEDICINE

## 2020-12-16 PROCEDURE — 1090F PRES/ABSN URINE INCON ASSESS: CPT | Performed by: FAMILY MEDICINE

## 2020-12-16 PROCEDURE — 1036F TOBACCO NON-USER: CPT | Performed by: FAMILY MEDICINE

## 2020-12-16 PROCEDURE — G8484 FLU IMMUNIZE NO ADMIN: HCPCS | Performed by: FAMILY MEDICINE

## 2020-12-16 PROCEDURE — G8427 DOCREV CUR MEDS BY ELIG CLIN: HCPCS | Performed by: FAMILY MEDICINE

## 2020-12-16 PROCEDURE — 4040F PNEUMOC VAC/ADMIN/RCVD: CPT | Performed by: FAMILY MEDICINE

## 2020-12-16 PROCEDURE — G8399 PT W/DXA RESULTS DOCUMENT: HCPCS | Performed by: FAMILY MEDICINE

## 2020-12-16 PROCEDURE — 3288F FALL RISK ASSESSMENT DOCD: CPT | Performed by: FAMILY MEDICINE

## 2020-12-16 PROCEDURE — 99214 OFFICE O/P EST MOD 30 MIN: CPT | Performed by: FAMILY MEDICINE

## 2020-12-16 PROCEDURE — 1123F ACP DISCUSS/DSCN MKR DOCD: CPT | Performed by: FAMILY MEDICINE

## 2020-12-16 RX ORDER — METOPROLOL SUCCINATE 50 MG/1
50 TABLET, EXTENDED RELEASE ORAL DAILY
Qty: 90 TABLET | Refills: 1 | Status: SHIPPED | OUTPATIENT
Start: 2020-12-16 | End: 2021-03-25 | Stop reason: DRUGHIGH

## 2020-12-16 RX ORDER — LISINOPRIL 10 MG/1
10 TABLET ORAL DAILY
Qty: 90 TABLET | Refills: 0 | Status: SHIPPED | OUTPATIENT
Start: 2020-12-16 | End: 2021-02-08

## 2020-12-16 RX ORDER — HYDROXYZINE HYDROCHLORIDE 10 MG/1
10 TABLET, FILM COATED ORAL 3 TIMES DAILY PRN
Qty: 60 TABLET | Refills: 0 | Status: SHIPPED | OUTPATIENT
Start: 2020-12-16 | End: 2020-12-24 | Stop reason: SINTOL

## 2020-12-16 SDOH — ECONOMIC STABILITY: TRANSPORTATION INSECURITY
IN THE PAST 12 MONTHS, HAS LACK OF TRANSPORTATION KEPT YOU FROM MEETINGS, WORK, OR FROM GETTING THINGS NEEDED FOR DAILY LIVING?: NOT ASKED

## 2020-12-16 SDOH — ECONOMIC STABILITY: FOOD INSECURITY: WITHIN THE PAST 12 MONTHS, YOU WORRIED THAT YOUR FOOD WOULD RUN OUT BEFORE YOU GOT MONEY TO BUY MORE.: NEVER TRUE

## 2020-12-16 SDOH — ECONOMIC STABILITY: FOOD INSECURITY: WITHIN THE PAST 12 MONTHS, THE FOOD YOU BOUGHT JUST DIDN'T LAST AND YOU DIDN'T HAVE MONEY TO GET MORE.: NEVER TRUE

## 2020-12-16 SDOH — ECONOMIC STABILITY: INCOME INSECURITY: HOW HARD IS IT FOR YOU TO PAY FOR THE VERY BASICS LIKE FOOD, HOUSING, MEDICAL CARE, AND HEATING?: NOT HARD AT ALL

## 2020-12-16 SDOH — ECONOMIC STABILITY: TRANSPORTATION INSECURITY
IN THE PAST 12 MONTHS, HAS THE LACK OF TRANSPORTATION KEPT YOU FROM MEDICAL APPOINTMENTS OR FROM GETTING MEDICATIONS?: NOT ASKED

## 2020-12-16 ASSESSMENT — PATIENT HEALTH QUESTIONNAIRE - PHQ9
SUM OF ALL RESPONSES TO PHQ QUESTIONS 1-9: 0
1. LITTLE INTEREST OR PLEASURE IN DOING THINGS: 0
2. FEELING DOWN, DEPRESSED OR HOPELESS: 0
SUM OF ALL RESPONSES TO PHQ QUESTIONS 1-9: 0
SUM OF ALL RESPONSES TO PHQ9 QUESTIONS 1 & 2: 0
SUM OF ALL RESPONSES TO PHQ QUESTIONS 1-9: 0

## 2020-12-16 ASSESSMENT — ENCOUNTER SYMPTOMS
BLOOD IN STOOL: 0
BACK PAIN: 0
COUGH: 0
VOICE CHANGE: 0
TROUBLE SWALLOWING: 0
DIARRHEA: 0
COLOR CHANGE: 0
RHINORRHEA: 1
SHORTNESS OF BREATH: 0
RECTAL PAIN: 0
ABDOMINAL DISTENTION: 0
EYE REDNESS: 0
ABDOMINAL PAIN: 0
SINUS PRESSURE: 0
EYE PAIN: 0
CHEST TIGHTNESS: 0
CONSTIPATION: 0
ANAL BLEEDING: 0
VOMITING: 0
EYE DISCHARGE: 0
NAUSEA: 0

## 2020-12-16 NOTE — PROGRESS NOTES
pain and neck stiffness. Skin: Negative for color change, pallor and rash. Allergic/Immunologic: Negative for environmental allergies, food allergies and immunocompromised state. Neurological: Negative for dizziness, tremors, seizures, syncope, facial asymmetry, speech difficulty, weakness, light-headedness, numbness and headaches. Hematological: Negative for adenopathy. Does not bruise/bleed easily. Psychiatric/Behavioral: Negative for agitation, behavioral problems, confusion, decreased concentration, sleep disturbance and suicidal ideas. The patient is not nervous/anxious. Objective:   Physical Exam  Constitutional:       General: She is not in acute distress. HENT:      Head: Normocephalic and atraumatic. Right Ear: External ear normal.      Left Ear: External ear normal.   Eyes:      Conjunctiva/sclera: Conjunctivae normal.      Pupils: Pupils are equal, round, and reactive to light. Neck:      Musculoskeletal: Normal range of motion. Thyroid: No thyromegaly. Trachea: No tracheal deviation. Cardiovascular:      Rate and Rhythm: Normal rate and regular rhythm. Heart sounds: No murmur. No friction rub. No gallop. Pulmonary:      Effort: No respiratory distress. Breath sounds: No stridor. No wheezing or rales. Chest:      Chest wall: No tenderness. Abdominal:      General: Bowel sounds are normal. There is no distension. Palpations: Abdomen is soft. Tenderness: There is no abdominal tenderness. There is no rebound. Musculoskeletal: Normal range of motion. Lymphadenopathy:      Cervical: No cervical adenopathy. Skin:     General: Skin is warm. Coloration: Skin is not pale. Findings: No erythema or rash. Neurological:      Mental Status: She is alert and oriented to person, place, and time. Cranial Nerves: No cranial nerve deficit. Motor: No abnormal muscle tone.       Deep Tendon Reflexes: Reflexes normal.         Assessment: Diagnosis Orders   1. Vitamin D deficiency     2. Osteopenia, unspecified location     3. History of bleeding peptic ulcer     4. History of arterial ischemic stroke     5. Stage 2 chronic kidney disease     6. Gaytan's esophagus with dysplasia     7. Gastroesophageal reflux disease, unspecified whether esophagitis present     8. Early menopause     9. Anemia, unspecified type     10. Essential hypertension     11. Coronary artery disease without angina pectoris, unspecified vessel or lesion type, unspecified whether native or transplanted heart  LIDA - Nicky Clifton MD, Cardiology, [facility]. Narrowing of intervertebral disc space  LIDA Adams MD, Orthopedic Surgery, [facility]:      Orders Placed This Encounter   Procedures   Mara Winslow MD, Cardiology, Dave Lao MD, Orthopedic Surgery, [facility] Encounter Medications as of 12/16/2020   Medication Sig Dispense Refill    hydrOXYzine (ATARAX) 10 MG tablet Take 1 tablet by mouth 3 times daily as needed for Anxiety (anxiety an dsleep) 60 tablet 0    lisinopril (PRINIVIL;ZESTRIL) 10 MG tablet Take 1 tablet by mouth daily 90 tablet 0    meclizine (ANTIVERT) 25 MG tablet Take 1 tablet by mouth 3 times daily as needed for Dizziness 30 tablet 0    LORazepam (ATIVAN) 1 MG tablet Take 0.5 tablets by mouth every 8 hours as needed for Anxiety for up to 30 days.  10 tablet 0    metoprolol succinate (TOPROL XL) 50 MG extended release tablet TAKE 1 TABLET BY MOUTH  DAILY 90 tablet 3    atorvastatin (LIPITOR) 40 MG tablet Take 1 tablet by mouth daily 90 tablet 1    clopidogrel (PLAVIX) 75 MG tablet Take 1 tablet by mouth daily 90 tablet 3    pantoprazole (PROTONIX) 20 MG tablet Take 1 tablet by mouth daily 90 tablet 1    Cyanocobalamin (B-12) 1000 MCG SUBL Place 1 tablet under the tongue daily 90 tablet 5    BABY ASPIRIN PO Take by mouth Indications: take 3 times a week ( she stopped and I have asked she resume as it was recommended by Cardio)       Calcium Carbonate-Vit D-Min (CALCIUM 1200) 4733-5294 MG-UNIT CHEW Take by mouth      [DISCONTINUED] lisinopril (PRINIVIL;ZESTRIL) 5 MG tablet Take 1 tablet by mouth daily 90 tablet 1     No facility-administered encounter medications on file as of 12/16/2020.             Fe Bell MD

## 2020-12-24 ENCOUNTER — OFFICE VISIT (OUTPATIENT)
Dept: FAMILY MEDICINE CLINIC | Age: 80
End: 2020-12-24
Payer: MEDICARE

## 2020-12-24 VITALS
DIASTOLIC BLOOD PRESSURE: 72 MMHG | HEART RATE: 60 BPM | BODY MASS INDEX: 22.81 KG/M2 | WEIGHT: 116.2 LBS | SYSTOLIC BLOOD PRESSURE: 138 MMHG | TEMPERATURE: 97.2 F | OXYGEN SATURATION: 97 % | HEIGHT: 60 IN

## 2020-12-24 PROCEDURE — 99213 OFFICE O/P EST LOW 20 MIN: CPT | Performed by: FAMILY MEDICINE

## 2020-12-24 PROCEDURE — 1036F TOBACCO NON-USER: CPT | Performed by: FAMILY MEDICINE

## 2020-12-24 PROCEDURE — 4040F PNEUMOC VAC/ADMIN/RCVD: CPT | Performed by: FAMILY MEDICINE

## 2020-12-24 PROCEDURE — 1123F ACP DISCUSS/DSCN MKR DOCD: CPT | Performed by: FAMILY MEDICINE

## 2020-12-24 PROCEDURE — G8399 PT W/DXA RESULTS DOCUMENT: HCPCS | Performed by: FAMILY MEDICINE

## 2020-12-24 PROCEDURE — 3288F FALL RISK ASSESSMENT DOCD: CPT | Performed by: FAMILY MEDICINE

## 2020-12-24 PROCEDURE — 1090F PRES/ABSN URINE INCON ASSESS: CPT | Performed by: FAMILY MEDICINE

## 2020-12-24 PROCEDURE — G8420 CALC BMI NORM PARAMETERS: HCPCS | Performed by: FAMILY MEDICINE

## 2020-12-24 PROCEDURE — G8484 FLU IMMUNIZE NO ADMIN: HCPCS | Performed by: FAMILY MEDICINE

## 2020-12-24 PROCEDURE — G8427 DOCREV CUR MEDS BY ELIG CLIN: HCPCS | Performed by: FAMILY MEDICINE

## 2020-12-24 PROCEDURE — G8510 SCR DEP NEG, NO PLAN REQD: HCPCS | Performed by: FAMILY MEDICINE

## 2020-12-24 RX ORDER — ATORVASTATIN CALCIUM 40 MG/1
40 TABLET, FILM COATED ORAL DAILY
Qty: 90 TABLET | Refills: 1 | Status: SHIPPED | OUTPATIENT
Start: 2020-12-24 | End: 2021-07-06

## 2020-12-24 RX ORDER — BUSPIRONE HYDROCHLORIDE 5 MG/1
TABLET ORAL
Qty: 60 TABLET | Refills: 0 | Status: SHIPPED | OUTPATIENT
Start: 2020-12-24 | End: 2021-02-24

## 2020-12-24 ASSESSMENT — ENCOUNTER SYMPTOMS
BACK PAIN: 0
ABDOMINAL PAIN: 0
TROUBLE SWALLOWING: 0
COUGH: 0
COLOR CHANGE: 0
ANAL BLEEDING: 0
NAUSEA: 0
DIARRHEA: 0
EYE PAIN: 0
CONSTIPATION: 0
VOMITING: 0
VOICE CHANGE: 0
ABDOMINAL DISTENTION: 0
SHORTNESS OF BREATH: 0
EYE DISCHARGE: 0
CHEST TIGHTNESS: 0
BLOOD IN STOOL: 0
SINUS PRESSURE: 0
EYE REDNESS: 0
RECTAL PAIN: 0

## 2020-12-24 ASSESSMENT — PATIENT HEALTH QUESTIONNAIRE - PHQ9
2. FEELING DOWN, DEPRESSED OR HOPELESS: 0
SUM OF ALL RESPONSES TO PHQ QUESTIONS 1-9: 0
1. LITTLE INTEREST OR PLEASURE IN DOING THINGS: 0
SUM OF ALL RESPONSES TO PHQ QUESTIONS 1-9: 0
SUM OF ALL RESPONSES TO PHQ9 QUESTIONS 1 & 2: 0
SUM OF ALL RESPONSES TO PHQ QUESTIONS 1-9: 0

## 2020-12-24 NOTE — PROGRESS NOTES
Subjective:      Patient ID: Verner Loron is a 79 y.o. female. HPI States feels a lot better than she was but when she tied the atarax it make her groggy so she stopped taking that. Mood,ok, states not exercising yet but plans to. So far not yet seen cardiology or GI. States has yet to see the back specialist that I referred her to as well. States seen Dr Sadiq Meeks saw her in sept- wants their number so she can follow up. Review of Systems   Constitutional: Negative for activity change, appetite change and fatigue. HENT: Negative for dental problem, ear pain, hearing loss, postnasal drip, sinus pressure, sneezing, tinnitus, trouble swallowing and voice change. Eyes: Negative for pain, discharge, redness and visual disturbance. Respiratory: Negative for cough, chest tightness and shortness of breath. Cardiovascular: Negative for chest pain, palpitations and leg swelling. Gastrointestinal: Negative for abdominal distention, abdominal pain, anal bleeding, blood in stool, constipation, diarrhea, nausea, rectal pain and vomiting. Endocrine: Negative for cold intolerance, heat intolerance, polydipsia, polyphagia and polyuria. Genitourinary: Negative for decreased urine volume, difficulty urinating, dyspareunia, dysuria, enuresis, flank pain, frequency, genital sores, hematuria, menstrual problem, pelvic pain, urgency, vaginal bleeding and vaginal discharge. Musculoskeletal: Negative for arthralgias, back pain, gait problem, joint swelling, myalgias, neck pain and neck stiffness. Skin: Negative for color change, pallor and rash. Allergic/Immunologic: Negative for environmental allergies, food allergies and immunocompromised state. Neurological: Negative for dizziness, tremors, seizures, syncope, facial asymmetry, speech difficulty, weakness, light-headedness, numbness and headaches. Hematological: Negative for adenopathy. Does not bruise/bleed easily.    Psychiatric/Behavioral: Negative for agitation, behavioral problems, confusion, decreased concentration, sleep disturbance and suicidal ideas. The patient is not nervous/anxious. Objective:   Physical Exam  Constitutional:       General: She is not in acute distress. HENT:      Head: Normocephalic and atraumatic. Right Ear: External ear normal.      Left Ear: External ear normal.   Eyes:      Conjunctiva/sclera: Conjunctivae normal.      Pupils: Pupils are equal, round, and reactive to light. Neck:      Musculoskeletal: Normal range of motion. Thyroid: No thyromegaly. Trachea: No tracheal deviation. Cardiovascular:      Rate and Rhythm: Normal rate and regular rhythm. Heart sounds: No murmur. No friction rub. No gallop. Pulmonary:      Effort: No respiratory distress. Breath sounds: No stridor. No wheezing or rales. Chest:      Chest wall: No tenderness. Abdominal:      General: Bowel sounds are normal. There is no distension. Palpations: Abdomen is soft. Tenderness: There is no abdominal tenderness. There is no rebound. Musculoskeletal: Normal range of motion. Lymphadenopathy:      Cervical: No cervical adenopathy. Skin:     General: Skin is warm. Coloration: Skin is not pale. Findings: No erythema or rash. Neurological:      Mental Status: She is alert and oriented to person, place, and time. Cranial Nerves: No cranial nerve deficit. Motor: No abnormal muscle tone. Deep Tendon Reflexes: Reflexes normal.         Assessment:       Diagnosis Orders   1. Essential hypertension     2. History of cerebrovascular accident     3. Stage 2 chronic kidney disease     4. Osteopenia, unspecified location     5. Dyslipidemia     6. B12 deficiency     7. Other insomnia  busPIRone (BUSPAR) 5 MG tablet         Plan:      No orders of the defined types were placed in this encounter.       Outpatient Encounter Medications as of 12/24/2020   Medication Sig Dispense Refill    busPIRone (BUSPAR) 5 MG tablet 1-2 tablets at 6.00 pm 60 tablet 0    lisinopril (PRINIVIL;ZESTRIL) 10 MG tablet Take 1 tablet by mouth daily 90 tablet 0    metoprolol succinate (TOPROL XL) 50 MG extended release tablet Take 1 tablet by mouth daily 90 tablet 1    atorvastatin (LIPITOR) 40 MG tablet Take 1 tablet by mouth daily 90 tablet 1    clopidogrel (PLAVIX) 75 MG tablet Take 1 tablet by mouth daily 90 tablet 3    pantoprazole (PROTONIX) 20 MG tablet Take 1 tablet by mouth daily 90 tablet 1    Cyanocobalamin (B-12) 1000 MCG SUBL Place 1 tablet under the tongue daily 90 tablet 5    BABY ASPIRIN PO Take by mouth Indications: take 3 times a week ( she stopped and I have asked she resume as it was recommended by Cardio)       Calcium Carbonate-Vit D-Min (CALCIUM 1200) 7973-1239 MG-UNIT CHEW Take by mouth      [DISCONTINUED] hydrOXYzine (ATARAX) 10 MG tablet Take 1 tablet by mouth 3 times daily as needed for Anxiety (anxiety an dsleep) (Patient not taking: Reported on 12/24/2020) 60 tablet 0     No facility-administered encounter medications on file as of 12/24/2020.             Cristo Hopkins MD

## 2021-01-14 ENCOUNTER — HOSPITAL ENCOUNTER (OUTPATIENT)
Dept: PHARMACY | Age: 81
Setting detail: THERAPIES SERIES
Discharge: HOME | End: 2021-01-14
Payer: MEDICARE

## 2021-01-22 DIAGNOSIS — K21.9 GASTROESOPHAGEAL REFLUX DISEASE: ICD-10-CM

## 2021-01-25 RX ORDER — PANTOPRAZOLE SODIUM 20 MG/1
20 TABLET, DELAYED RELEASE ORAL DAILY
Qty: 90 TABLET | Refills: 3 | Status: SHIPPED | OUTPATIENT
Start: 2021-01-25 | End: 2021-10-18 | Stop reason: SDUPTHER

## 2021-02-08 RX ORDER — LISINOPRIL 10 MG/1
10 TABLET ORAL DAILY
Qty: 90 TABLET | Refills: 3 | Status: SHIPPED | OUTPATIENT
Start: 2021-02-08 | End: 2021-07-15 | Stop reason: SDUPTHER

## 2021-02-24 ENCOUNTER — OFFICE VISIT (OUTPATIENT)
Dept: FAMILY MEDICINE CLINIC | Age: 81
End: 2021-02-24
Payer: MEDICARE

## 2021-02-24 VITALS
DIASTOLIC BLOOD PRESSURE: 76 MMHG | OXYGEN SATURATION: 99 % | WEIGHT: 120 LBS | HEIGHT: 60 IN | HEART RATE: 67 BPM | SYSTOLIC BLOOD PRESSURE: 160 MMHG | TEMPERATURE: 97 F | BODY MASS INDEX: 23.56 KG/M2

## 2021-02-24 VITALS
TEMPERATURE: 97 F | HEART RATE: 58 BPM | WEIGHT: 120 LBS | BODY MASS INDEX: 23.56 KG/M2 | HEIGHT: 60 IN | SYSTOLIC BLOOD PRESSURE: 155 MMHG | DIASTOLIC BLOOD PRESSURE: 74 MMHG | OXYGEN SATURATION: 99 %

## 2021-02-24 DIAGNOSIS — I10 ESSENTIAL HYPERTENSION: Primary | ICD-10-CM

## 2021-02-24 DIAGNOSIS — K21.9 GASTROESOPHAGEAL REFLUX DISEASE, UNSPECIFIED WHETHER ESOPHAGITIS PRESENT: ICD-10-CM

## 2021-02-24 DIAGNOSIS — F41.1 GENERALIZED ANXIETY DISORDER: ICD-10-CM

## 2021-02-24 DIAGNOSIS — E55.9 VITAMIN D DEFICIENCY: ICD-10-CM

## 2021-02-24 DIAGNOSIS — E53.8 B12 DEFICIENCY: ICD-10-CM

## 2021-02-24 DIAGNOSIS — Z00.00 ROUTINE GENERAL MEDICAL EXAMINATION AT A HEALTH CARE FACILITY: Primary | ICD-10-CM

## 2021-02-24 DIAGNOSIS — N18.2 STAGE 2 CHRONIC KIDNEY DISEASE: ICD-10-CM

## 2021-02-24 DIAGNOSIS — Z86.73 HISTORY OF ARTERIAL ISCHEMIC STROKE: ICD-10-CM

## 2021-02-24 DIAGNOSIS — Z87.11 HISTORY OF BLEEDING PEPTIC ULCER: ICD-10-CM

## 2021-02-24 DIAGNOSIS — I10 ESSENTIAL HYPERTENSION: ICD-10-CM

## 2021-02-24 PROCEDURE — 99213 OFFICE O/P EST LOW 20 MIN: CPT | Performed by: FAMILY MEDICINE

## 2021-02-24 PROCEDURE — 1123F ACP DISCUSS/DSCN MKR DOCD: CPT | Performed by: FAMILY MEDICINE

## 2021-02-24 PROCEDURE — 1036F TOBACCO NON-USER: CPT | Performed by: FAMILY MEDICINE

## 2021-02-24 PROCEDURE — G8420 CALC BMI NORM PARAMETERS: HCPCS | Performed by: FAMILY MEDICINE

## 2021-02-24 PROCEDURE — G8427 DOCREV CUR MEDS BY ELIG CLIN: HCPCS | Performed by: FAMILY MEDICINE

## 2021-02-24 PROCEDURE — G8484 FLU IMMUNIZE NO ADMIN: HCPCS | Performed by: FAMILY MEDICINE

## 2021-02-24 PROCEDURE — G8399 PT W/DXA RESULTS DOCUMENT: HCPCS | Performed by: FAMILY MEDICINE

## 2021-02-24 PROCEDURE — 4040F PNEUMOC VAC/ADMIN/RCVD: CPT | Performed by: FAMILY MEDICINE

## 2021-02-24 PROCEDURE — G0439 PPPS, SUBSEQ VISIT: HCPCS | Performed by: FAMILY MEDICINE

## 2021-02-24 PROCEDURE — 1090F PRES/ABSN URINE INCON ASSESS: CPT | Performed by: FAMILY MEDICINE

## 2021-02-24 RX ORDER — PAROXETINE 10 MG/1
10 TABLET, FILM COATED ORAL DAILY
Qty: 30 TABLET | Refills: 3 | Status: SHIPPED | OUTPATIENT
Start: 2021-02-24 | End: 2021-04-08 | Stop reason: ALTCHOICE

## 2021-02-24 ASSESSMENT — ENCOUNTER SYMPTOMS
EYE PAIN: 0
ANAL BLEEDING: 0
VOMITING: 0
ABDOMINAL PAIN: 0
NAUSEA: 0
TROUBLE SWALLOWING: 0
DIARRHEA: 0
COUGH: 0
CHEST TIGHTNESS: 0
VOICE CHANGE: 0
EYE REDNESS: 0
BACK PAIN: 0
SINUS PRESSURE: 0
COLOR CHANGE: 0
CONSTIPATION: 0
EYE DISCHARGE: 0
BLOOD IN STOOL: 0
SHORTNESS OF BREATH: 0
RECTAL PAIN: 0
ABDOMINAL DISTENTION: 0

## 2021-02-24 ASSESSMENT — PATIENT HEALTH QUESTIONNAIRE - PHQ9
SUM OF ALL RESPONSES TO PHQ QUESTIONS 1-9: 0
1. LITTLE INTEREST OR PLEASURE IN DOING THINGS: 0
SUM OF ALL RESPONSES TO PHQ QUESTIONS 1-9: 0
1. LITTLE INTEREST OR PLEASURE IN DOING THINGS: 0
SUM OF ALL RESPONSES TO PHQ QUESTIONS 1-9: 0
2. FEELING DOWN, DEPRESSED OR HOPELESS: 0
2. FEELING DOWN, DEPRESSED OR HOPELESS: 0
SUM OF ALL RESPONSES TO PHQ QUESTIONS 1-9: 0

## 2021-02-24 ASSESSMENT — LIFESTYLE VARIABLES
HOW OFTEN DO YOU HAVE A DRINK CONTAINING ALCOHOL: 1
HOW OFTEN DO YOU HAVE SIX OR MORE DRINKS ON ONE OCCASION: 0
AUDIT-C TOTAL SCORE: 1

## 2021-02-24 NOTE — PROGRESS NOTES
Subjective:      Patient ID: Debo Gunn is a 79 y.o. female. HPI  Anxious - as marito for ativan that seemed to help with her dizziness in the past.  Saw cardiology yesterday and they are doing carotid doppler on her to see if that has anything to do with   Her sx. She has H/O ischemic stroke. Dyslipidemia treated with lipitor 40 mg , BP still up. Will see if a low dose of antianxiety med will help lower anxiety as well as BP and help her sleep. States sleep disturbed as she keeps waking up as her mind is troubled. GERD sx are well controlled.  has not been herself after  passed 2 years ago, lives   With daughter and grand kids. She also has great grandkids. Review of Systems   Constitutional: Negative for activity change, appetite change and fatigue. HENT: Negative for dental problem, ear pain, hearing loss, postnasal drip, sinus pressure, sneezing, tinnitus, trouble swallowing and voice change. Eyes: Negative for pain, discharge, redness and visual disturbance. Respiratory: Negative for cough, chest tightness and shortness of breath. Cardiovascular: Negative for chest pain, palpitations and leg swelling. Gastrointestinal: Negative for abdominal distention, abdominal pain, anal bleeding, blood in stool, constipation, diarrhea, nausea, rectal pain and vomiting. Endocrine: Negative for cold intolerance, heat intolerance, polydipsia, polyphagia and polyuria. Genitourinary: Negative for decreased urine volume, difficulty urinating, dyspareunia, dysuria, enuresis, flank pain, frequency, genital sores, hematuria, menstrual problem, pelvic pain, urgency, vaginal bleeding and vaginal discharge. Musculoskeletal: Negative for arthralgias, back pain, gait problem, joint swelling, myalgias, neck pain and neck stiffness. Skin: Negative for color change, pallor and rash. Allergic/Immunologic: Negative for environmental allergies, food allergies and immunocompromised state. Neurological: Negative for dizziness, tremors, seizures, syncope, facial asymmetry, speech difficulty, weakness, light-headedness, numbness and headaches. Hematological: Negative for adenopathy. Does not bruise/bleed easily. Psychiatric/Behavioral: Positive for sleep disturbance. Negative for agitation, behavioral problems, confusion, decreased concentration and suicidal ideas. The patient is nervous/anxious. Objective:   Physical Exam  Constitutional:       General: She is not in acute distress. HENT:      Head: Normocephalic and atraumatic. Right Ear: External ear normal.      Left Ear: External ear normal.   Eyes:      Conjunctiva/sclera: Conjunctivae normal.      Pupils: Pupils are equal, round, and reactive to light. Neck:      Musculoskeletal: Normal range of motion. Thyroid: No thyromegaly. Trachea: No tracheal deviation. Cardiovascular:      Rate and Rhythm: Normal rate and regular rhythm. Pulses: Normal pulses. Heart sounds: No murmur. No friction rub. No gallop. Pulmonary:      Effort: Pulmonary effort is normal. No respiratory distress. Breath sounds: Normal breath sounds. No stridor. No wheezing or rales. Chest:      Chest wall: No tenderness. Abdominal:      General: Abdomen is flat. Bowel sounds are normal. There is no distension. Palpations: Abdomen is soft. Tenderness: There is no abdominal tenderness. There is no rebound. Musculoskeletal: Normal range of motion. Lymphadenopathy:      Cervical: No cervical adenopathy. Skin:     General: Skin is warm. Coloration: Skin is not pale. Findings: No erythema or rash. Neurological:      General: No focal deficit present. Mental Status: She is alert and oriented to person, place, and time. Mental status is at baseline. Cranial Nerves: No cranial nerve deficit. Motor: No abnormal muscle tone.       Deep Tendon Reflexes: Reflexes normal.   Psychiatric:         Mood and Affect: Mood normal.         Behavior: Behavior normal.         Thought Content: Thought content normal.         Judgment: Judgment normal.         Assessment:       Diagnosis Orders   1. Routine general medical examination at a health care facility     2. Generalized anxiety disorder  PARoxetine (PAXIL) 10 MG tablet   3. History of bleeding peptic ulcer     4. History of arterial ischemic stroke     5. B12 deficiency     6. Vitamin D deficiency     7. Stage 2 chronic kidney disease     8. Gastroesophageal reflux disease, unspecified whether esophagitis present     9. Essential hypertension           Plan:      No orders of the defined types were placed in this encounter. Outpatient Encounter Medications as of 2/24/2021   Medication Sig Dispense Refill    PARoxetine (PAXIL) 10 MG tablet Take 1 tablet by mouth daily 30 tablet 3    lisinopril (PRINIVIL;ZESTRIL) 10 MG tablet TAKE 1 TABLET BY MOUTH  DAILY 90 tablet 3    pantoprazole (PROTONIX) 20 MG tablet TAKE 1 TABLET BY MOUTH  DAILY 90 tablet 3    atorvastatin (LIPITOR) 40 MG tablet Take 1 tablet by mouth daily 90 tablet 1    metoprolol succinate (TOPROL XL) 50 MG extended release tablet Take 1 tablet by mouth daily 90 tablet 1    clopidogrel (PLAVIX) 75 MG tablet Take 1 tablet by mouth daily 90 tablet 3    Cyanocobalamin (B-12) 1000 MCG SUBL Place 1 tablet under the tongue daily 90 tablet 5    BABY ASPIRIN PO Take by mouth Indications: take 3 times a week ( she stopped and I have asked she resume as it was recommended by Cardio)       Calcium Carbonate-Vit D-Min (CALCIUM 1200) 5521-0237 MG-UNIT CHEW Take by mouth       No facility-administered encounter medications on file as of 2/24/2021.             Viktoriya Rizzo MD

## 2021-02-24 NOTE — PROGRESS NOTES
Medicare Annual Wellness Visit  Name: Nasrin Richardson Date: 2021   MRN: [id number] Sex: Female   Age: 79 y.o. Ethnicity: Non-/Non    : 1940 Race: Asmita Gresham is here for No chief complaint on file. Screenings for behavioral, psychosocial and functional/safety risks, and cognitive dysfunction are all negative except as indicated below. These results, as well as other patient data from the [address] form, are documented in Flowsheets linked to this Encounter. Allergies   Allergen Reactions    Clarithromycin     Levaquin  [Levofloxacin In D5w]     Levofloxacin     Sertraline        Prior to Visit Medications    Medication Sig Taking? Authorizing Provider   lisinopril (PRINIVIL;ZESTRIL) 10 MG tablet TAKE 1 TABLET BY MOUTH  DAILY Yes Sara Kuhn MD   pantoprazole (PROTONIX) 20 MG tablet TAKE 1 TABLET BY MOUTH  DAILY Yes Sara Kuhn MD   atorvastatin (LIPITOR) 40 MG tablet Take 1 tablet by mouth daily Yes Sara Kuhn MD   metoprolol succinate (TOPROL XL) 50 MG extended release tablet Take 1 tablet by mouth daily Yes Sara Kuhn MD   clopidogrel (PLAVIX) 75 MG tablet Take 1 tablet by mouth daily Yes Sara Kuhn MD   Cyanocobalamin (B-12) 1000 MCG SUBL Place 1 tablet under the tongue daily Yes Sara Kuhn MD   BABY ASPIRIN PO Take by mouth Indications: take 3 times a week ( she stopped and I have asked she resume as it was recommended by Cardio)  Yes Historical Provider, MD   Calcium Carbonate-Vit D-Min (CALCIUM 1200) 4740-1800 MG-UNIT CHEW Take by mouth Yes Historical Provider, MD       Past Medical History:   Diagnosis Date    Dyslipidemia 8/15/2019    Hypertension        Past Surgical History:   Procedure Laterality Date    APPENDECTOMY      CHOLECYSTECTOMY      HYSTERECTOMY, TOTAL ABDOMINAL      TONSILLECTOMY         No family history on file.     CareTeam (Including outside providers/suppliers regularly involved in providing care):   Patient Care Team:  Raeann Carrion MD as PCP - General (Family Medicine)  Raeann Carrion MD as PCP - Yasemin Enciso Provider    Wt Readings from Last 3 Encounters:   02/24/21 120 lb (54.4 kg)   12/24/20 116 lb 3.2 oz (52.7 kg)   12/16/20 119 lb (54 kg)     There were no vitals filed for this visit. There is no height or weight on file to calculate BMI. Based upon direct observation of the patient, evaluation of cognition reveals recent and remote memory intact. General Appearance: alert and oriented to person, place and time, well developed and well- nourished, in no acute distress  Skin: warm and dry, no rash or erythema  Head: normocephalic and atraumatic  Eyes: pupils equal, round, and reactive to light, extraocular eye movements intact, conjunctivae normal  ENT: tympanic membrane, external ear and ear canal normal bilaterally, nose without deformity, nasal mucosa and turbinates normal without polyps  Neck: supple and non-tender without mass, no thyromegaly or thyroid nodules, no cervical lymphadenopathy  Pulmonary/Chest: clear to auscultation bilaterally- no wheezes, rales or rhonchi, normal air movement, no respiratory distress  Cardiovascular: normal rate, regular rhythm, normal S1 and S2, no murmurs, rubs, clicks, or gallops, distal pulses intact, no carotid bruits  Abdomen: soft, non-tender, non-distended, normal bowel sounds, no masses or organomegaly  Extremities: no cyanosis, clubbing or edema  Musculoskeletal: normal range of motion, no joint swelling, deformity or tenderness  Neurologic: reflexes normal and symmetric, no cranial nerve deficit, gait, coordination and speech normal      Patient's complete Health Risk Assessment and screening values have been reviewed and are found in Flowsheets. The following problems were reviewed today and where indicated follow up appointments were made and/or referrals ordered.     Positive Risk Factor Screenings with Interventions: General Health and ACP:     Advance Directives     Power of  Living Will ACP-Advance Directive ACP-Power of     Not on File Not on File Not on File Not on File      General Health Risk Interventions:  · NA        Personalized Preventive Plan   Current Health Maintenance Status  Immunization History   Administered Date(s) Administered    COVID-19, Moderna, 100mcg/0.5ml 01/14/2021, 02/11/2021    Influenza, Quadv, adjuvanted, 65 yrs +, IM, PF (Fluad) 09/08/2020    Influenza, Triv, inactivated, subunit, adjuvanted, IM (Fluad 65 yrs and older) 09/12/2019    Pneumococcal Conjugate 13-valent ([id number]) 08/15/2019    Pneumococcal Polysaccharide ([id number]) 09/08/2020        Health Maintenance   Topic Date Due    Annual Wellness Visit (AWV)  08/15/2019    DTaP/Tdap/Td vaccine (1 - Tdap) 08/31/2021 (Originally 7/14/1959)    Shingles Vaccine (1 of 2) 08/31/2021 (Originally 7/14/1991)    Lipid screen  10/27/2021    Potassium monitoring  12/09/2021    Creatinine monitoring  12/09/2021    DEXA (modify frequency per FRAX score)  Completed    Flu vaccine  Completed    Pneumococcal 65+ years Vaccine  Completed    COVID-19 Vaccine  Completed    Hepatitis A vaccine  Aged Out    Hepatitis B vaccine  Aged Out    Hib vaccine  Aged Out    Meningococcal (ACWY) vaccine  Aged Out    Hepatitis C screen  Discontinued     Recommendations for Vator Due: see orders and patient instructions/AVS.  .   Recommended screening schedule for the next 5-10 years is provided to the patient in written form: see Patient Instructions/AVS.    Diagnoses and all orders for this visit:    Routine general medical examination at a health care facility

## 2021-02-24 NOTE — PATIENT INSTRUCTIONS
Personalized Preventive Plan for Jennifer Segura - 2/24/2021  Medicare offers a range of preventive health benefits. Some of the tests and screenings are paid in full while other may be subject to a deductible, co-insurance, and/or copay. Some of these benefits include a comprehensive review of your medical history including lifestyle, illnesses that may run in your family, and various assessments and screenings as appropriate. After reviewing your medical record and screening and assessments performed today your provider may have ordered immunizations, labs, imaging, and/or referrals for you. A list of these orders (if applicable) as well as your Preventive Care list are included within your After Visit Summary for your review. Other Preventive Recommendations:    · A preventive eye exam performed by an eye specialist is recommended every 1-2 years to screen for glaucoma; cataracts, macular degeneration, and other eye disorders. · A preventive dental visit is recommended every 6 months. · Try to get at least 150 minutes of exercise per week or 10,000 steps per day on a pedometer . · Order or download the FREE \"Exercise & Physical Activity: Your Everyday Guide\" from The [employer]. Call 2-316.985.5994 or search [employer] online. · You need 1803-1446 mg of calcium and 8990-8370 IU of vitamin D per day. It is possible to meet your calcium requirement with diet alone, but a vitamin D supplement is usually necessary to meet this goal.  · When exposed to the sun, use a sunscreen that protects against both UVA and UVB radiation with an SPF of 30 or greater. Reapply every 2 to 3 hours or after sweating, drying off with a towel, or swimming. · Always wear a seat belt when traveling in a car. Always wear a helmet when riding a bicycle or motorcycle.

## 2021-02-26 ENCOUNTER — TELEPHONE (OUTPATIENT)
Dept: FAMILY MEDICINE CLINIC | Age: 81
End: 2021-02-26

## 2021-02-27 DIAGNOSIS — G62.9 NEUROPATHY: Primary | ICD-10-CM

## 2021-02-27 RX ORDER — GABAPENTIN 100 MG/1
100 CAPSULE ORAL DAILY PRN
Qty: 90 CAPSULE | Refills: 1 | Status: SHIPPED | OUTPATIENT
Start: 2021-02-27 | End: 2021-10-04 | Stop reason: SDUPTHER

## 2021-02-28 NOTE — TELEPHONE ENCOUNTER
I can give her a smaller dose to take as needed once daily- she has renal insufficiency- there is increased risk taking this med with poor renal function

## 2021-03-15 ENCOUNTER — TELEPHONE (OUTPATIENT)
Dept: FAMILY MEDICINE CLINIC | Age: 81
End: 2021-03-15

## 2021-03-25 ENCOUNTER — HOSPITAL ENCOUNTER (OUTPATIENT)
Age: 81
Discharge: HOME | End: 2021-03-25
Payer: MEDICARE

## 2021-03-25 ENCOUNTER — OFFICE VISIT (OUTPATIENT)
Dept: FAMILY MEDICINE CLINIC | Age: 81
End: 2021-03-25
Payer: MEDICARE

## 2021-03-25 VITALS
BODY MASS INDEX: 23.16 KG/M2 | HEART RATE: 71 BPM | WEIGHT: 118 LBS | SYSTOLIC BLOOD PRESSURE: 162 MMHG | OXYGEN SATURATION: 99 % | DIASTOLIC BLOOD PRESSURE: 80 MMHG | HEIGHT: 60 IN | TEMPERATURE: 97.4 F

## 2021-03-25 DIAGNOSIS — Z87.11 HISTORY OF BLEEDING PEPTIC ULCER: ICD-10-CM

## 2021-03-25 DIAGNOSIS — Z86.73 HISTORY OF ARTERIAL ISCHEMIC STROKE: ICD-10-CM

## 2021-03-25 DIAGNOSIS — E53.8 B12 DEFICIENCY: ICD-10-CM

## 2021-03-25 DIAGNOSIS — M85.80 OSTEOPENIA, UNSPECIFIED LOCATION: ICD-10-CM

## 2021-03-25 DIAGNOSIS — K21.9 GASTROESOPHAGEAL REFLUX DISEASE, UNSPECIFIED WHETHER ESOPHAGITIS PRESENT: ICD-10-CM

## 2021-03-25 DIAGNOSIS — E78.5 DYSLIPIDEMIA: ICD-10-CM

## 2021-03-25 DIAGNOSIS — N18.2 STAGE 2 CHRONIC KIDNEY DISEASE: ICD-10-CM

## 2021-03-25 DIAGNOSIS — M81.0 AGE-RELATED OSTEOPOROSIS WITHOUT CURRENT PATHOLOGICAL FRACTURE: ICD-10-CM

## 2021-03-25 DIAGNOSIS — J01.90 ACUTE SINUSITIS, RECURRENCE NOT SPECIFIED, UNSPECIFIED LOCATION: ICD-10-CM

## 2021-03-25 DIAGNOSIS — D64.9 ANEMIA, UNSPECIFIED TYPE: ICD-10-CM

## 2021-03-25 DIAGNOSIS — I10 ESSENTIAL HYPERTENSION: Primary | ICD-10-CM

## 2021-03-25 DIAGNOSIS — K22.719 BARRETT'S ESOPHAGUS WITH DYSPLASIA: ICD-10-CM

## 2021-03-25 DIAGNOSIS — E55.9 VITAMIN D DEFICIENCY: ICD-10-CM

## 2021-03-25 DIAGNOSIS — F41.1 GAD (GENERALIZED ANXIETY DISORDER): ICD-10-CM

## 2021-03-25 LAB
ABSOLUTE EOS #: 0.06 K/UL (ref 0–0.44)
ABSOLUTE IMMATURE GRANULOCYTE: <0.03 K/UL (ref 0–0.3)
ABSOLUTE LYMPH #: 0.74 K/UL (ref 1.1–3.7)
ABSOLUTE MONO #: 0.33 K/UL (ref 0.1–1.2)
ANION GAP SERPL CALCULATED.3IONS-SCNC: 16 MMOL/L (ref 9–17)
BASOPHILS # BLD: 1 % (ref 0–2)
BASOPHILS ABSOLUTE: 0.03 K/UL (ref 0–0.2)
BUN BLDV-MCNC: 18 MG/DL (ref 8–23)
BUN/CREAT BLD: ABNORMAL (ref 9–20)
CALCIUM SERPL-MCNC: 9.5 MG/DL (ref 8.6–10.4)
CHLORIDE BLD-SCNC: 108 MMOL/L (ref 98–107)
CO2: 21 MMOL/L (ref 20–31)
CREAT SERPL-MCNC: 0.85 MG/DL (ref 0.5–0.9)
DIFFERENTIAL TYPE: ABNORMAL
EOSINOPHILS RELATIVE PERCENT: 1 % (ref 1–4)
GFR AFRICAN AMERICAN: >60 ML/MIN
GFR NON-AFRICAN AMERICAN: >60 ML/MIN
GFR SERPL CREATININE-BSD FRML MDRD: ABNORMAL ML/MIN/{1.73_M2}
GFR SERPL CREATININE-BSD FRML MDRD: ABNORMAL ML/MIN/{1.73_M2}
GLUCOSE BLD-MCNC: 89 MG/DL (ref 70–99)
HCT VFR BLD CALC: 34 % (ref 36.3–47.1)
HEMOGLOBIN: 10.8 G/DL (ref 11.9–15.1)
IMMATURE GRANULOCYTES: 0 %
LYMPHOCYTES # BLD: 14 % (ref 24–43)
MCH RBC QN AUTO: 30.9 PG (ref 25.2–33.5)
MCHC RBC AUTO-ENTMCNC: 31.8 G/DL (ref 28.4–34.8)
MCV RBC AUTO: 97.4 FL (ref 82.6–102.9)
MONOCYTES # BLD: 6 % (ref 3–12)
NRBC AUTOMATED: 0 PER 100 WBC
PDW BLD-RTO: 12.8 % (ref 11.8–14.4)
PLATELET # BLD: 187 K/UL (ref 138–453)
PLATELET ESTIMATE: ABNORMAL
PMV BLD AUTO: 10.8 FL (ref 8.1–13.5)
POTASSIUM SERPL-SCNC: 3.8 MMOL/L (ref 3.7–5.3)
RBC # BLD: 3.49 M/UL (ref 3.95–5.11)
RBC # BLD: ABNORMAL 10*6/UL
SEG NEUTROPHILS: 78 % (ref 36–65)
SEGMENTED NEUTROPHILS ABSOLUTE COUNT: 4.28 K/UL (ref 1.5–8.1)
SODIUM BLD-SCNC: 145 MMOL/L (ref 135–144)
WBC # BLD: 5.5 K/UL (ref 3.5–11.3)
WBC # BLD: ABNORMAL 10*3/UL

## 2021-03-25 PROCEDURE — 4040F PNEUMOC VAC/ADMIN/RCVD: CPT | Performed by: FAMILY MEDICINE

## 2021-03-25 PROCEDURE — 36415 COLL VENOUS BLD VENIPUNCTURE: CPT

## 2021-03-25 PROCEDURE — G8484 FLU IMMUNIZE NO ADMIN: HCPCS | Performed by: FAMILY MEDICINE

## 2021-03-25 PROCEDURE — 1090F PRES/ABSN URINE INCON ASSESS: CPT | Performed by: FAMILY MEDICINE

## 2021-03-25 PROCEDURE — 1123F ACP DISCUSS/DSCN MKR DOCD: CPT | Performed by: FAMILY MEDICINE

## 2021-03-25 PROCEDURE — 99214 OFFICE O/P EST MOD 30 MIN: CPT | Performed by: FAMILY MEDICINE

## 2021-03-25 PROCEDURE — G8420 CALC BMI NORM PARAMETERS: HCPCS | Performed by: FAMILY MEDICINE

## 2021-03-25 PROCEDURE — 85025 COMPLETE CBC W/AUTO DIFF WBC: CPT

## 2021-03-25 PROCEDURE — 80048 BASIC METABOLIC PNL TOTAL CA: CPT

## 2021-03-25 PROCEDURE — 1036F TOBACCO NON-USER: CPT | Performed by: FAMILY MEDICINE

## 2021-03-25 PROCEDURE — G8399 PT W/DXA RESULTS DOCUMENT: HCPCS | Performed by: FAMILY MEDICINE

## 2021-03-25 PROCEDURE — G8427 DOCREV CUR MEDS BY ELIG CLIN: HCPCS | Performed by: FAMILY MEDICINE

## 2021-03-25 RX ORDER — METOPROLOL SUCCINATE 100 MG/1
100 TABLET, EXTENDED RELEASE ORAL DAILY
Qty: 30 TABLET | Refills: 3 | Status: CANCELLED | OUTPATIENT
Start: 2021-03-25

## 2021-03-25 RX ORDER — VENLAFAXINE HYDROCHLORIDE 37.5 MG/1
37.5 CAPSULE, EXTENDED RELEASE ORAL DAILY
Qty: 30 CAPSULE | Refills: 0 | Status: SHIPPED | OUTPATIENT
Start: 2021-03-25 | End: 2021-04-08 | Stop reason: ALTCHOICE

## 2021-03-25 RX ORDER — METOPROLOL SUCCINATE 100 MG/1
100 TABLET, EXTENDED RELEASE ORAL DAILY
Qty: 90 TABLET | Refills: 1 | Status: SHIPPED | OUTPATIENT
Start: 2021-03-25 | End: 2021-07-15 | Stop reason: SDUPTHER

## 2021-03-25 ASSESSMENT — ENCOUNTER SYMPTOMS
EYE PAIN: 0
CHEST TIGHTNESS: 0
TROUBLE SWALLOWING: 0
CONSTIPATION: 0
EYE DISCHARGE: 0
BLOOD IN STOOL: 0
VOMITING: 0
VOICE CHANGE: 0
ABDOMINAL PAIN: 0
SHORTNESS OF BREATH: 0
ABDOMINAL DISTENTION: 0
COLOR CHANGE: 0
EYE REDNESS: 0
NAUSEA: 0
BACK PAIN: 0
COUGH: 0
DIARRHEA: 0
SINUS PRESSURE: 0
RECTAL PAIN: 0
ANAL BLEEDING: 0

## 2021-03-25 ASSESSMENT — PATIENT HEALTH QUESTIONNAIRE - PHQ9
2. FEELING DOWN, DEPRESSED OR HOPELESS: 0
SUM OF ALL RESPONSES TO PHQ QUESTIONS 1-9: 0
1. LITTLE INTEREST OR PLEASURE IN DOING THINGS: 0

## 2021-03-25 NOTE — PROGRESS NOTES
Subjective:      Patient ID: Francoise Kearney is a 79 y.o. female. HPI  States was having dry mouth, sweats, palpitations and HA after starting paxil may be a few days after- took  The med for 18 days and then stopped. States the sx finally went away after she stopped it for over 10 days. States today she feels lousy again, feels like she is freezing and sweating,  Nose is dry and hurts, eats plugged   Ears are stuffy. Took OTC benadryl and may be helped some. States sleep is disturbed and on and off- has dreams . Gabapentin helps with her tingling in legs and arms at night but does not seem to help sleep. Review of Systems   Constitutional: Negative for activity change, appetite change and fatigue. HENT: Negative for dental problem, ear pain, hearing loss, postnasal drip, sinus pressure, sneezing, tinnitus, trouble swallowing and voice change. Eyes: Negative for pain, discharge, redness and visual disturbance. Respiratory: Negative for cough, chest tightness and shortness of breath. Cardiovascular: Positive for palpitations. Negative for chest pain and leg swelling. Gastrointestinal: Negative for abdominal distention, abdominal pain, anal bleeding, blood in stool, constipation, diarrhea, nausea, rectal pain and vomiting. Endocrine: Negative for cold intolerance, heat intolerance, polydipsia, polyphagia and polyuria. Genitourinary: Negative for decreased urine volume, difficulty urinating, dyspareunia, dysuria, enuresis, flank pain, frequency, genital sores, hematuria, menstrual problem, pelvic pain, urgency, vaginal bleeding and vaginal discharge. Musculoskeletal: Negative for arthralgias, back pain, gait problem, joint swelling, myalgias, neck pain and neck stiffness. Skin: Negative for color change, pallor and rash. Allergic/Immunologic: Negative for environmental allergies, food allergies and immunocompromised state.    Neurological: Negative for dizziness, tremors, seizures, syncope, facial asymmetry, speech difficulty, weakness, light-headedness, numbness and headaches. Hematological: Negative for adenopathy. Does not bruise/bleed easily. Psychiatric/Behavioral: Negative for agitation, behavioral problems, confusion, decreased concentration, sleep disturbance and suicidal ideas. The patient is nervous/anxious. Objective:   Physical Exam  Constitutional:       General: She is not in acute distress. HENT:      Head: Normocephalic and atraumatic. Right Ear: External ear normal.      Left Ear: External ear normal.   Eyes:      Conjunctiva/sclera: Conjunctivae normal.      Pupils: Pupils are equal, round, and reactive to light. Neck:      Musculoskeletal: Normal range of motion. Thyroid: No thyromegaly. Trachea: No tracheal deviation. Cardiovascular:      Rate and Rhythm: Normal rate and regular rhythm. Pulses: Normal pulses. Heart sounds: Normal heart sounds. No murmur. No friction rub. No gallop. Pulmonary:      Effort: Pulmonary effort is normal. No respiratory distress. Breath sounds: Normal breath sounds. No stridor. No wheezing or rales. Chest:      Chest wall: No tenderness. Abdominal:      General: Abdomen is flat. Bowel sounds are normal. There is no distension. Palpations: Abdomen is soft. Tenderness: There is no abdominal tenderness. There is no rebound. Musculoskeletal: Normal range of motion. Lymphadenopathy:      Cervical: No cervical adenopathy. Skin:     General: Skin is warm. Coloration: Skin is not pale. Findings: No erythema or rash. Neurological:      General: No focal deficit present. Mental Status: She is alert and oriented to person, place, and time. Mental status is at baseline. Cranial Nerves: No cranial nerve deficit. Motor: No abnormal muscle tone.       Deep Tendon Reflexes: Reflexes normal.   Psychiatric:         Mood and Affect: Mood normal.         Behavior: Behavior normal. Thought Content: Thought content normal.         Judgment: Judgment normal.         Assessment:       Diagnosis Orders   1. Essential hypertension  metoprolol succinate (TOPROL XL) 100 MG extended release tablet   2. Dyslipidemia     3. History of bleeding peptic ulcer     4. History of arterial ischemic stroke     5. B12 deficiency     6. Osteopenia, unspecified location     7. Vitamin D deficiency     8. Stage 2 chronic kidney disease  Basic Metabolic Panel   9. Gaytan's esophagus with dysplasia     10. Gastroesophageal reflux disease, unspecified whether esophagitis present     11. Age-related osteoporosis without current pathological fracture     12. Anemia, unspecified type     13. Acute sinusitis, recurrence not specified, unspecified location  CBC With Auto Differential   14. SARAH (generalized anxiety disorder)  venlafaxine (EFFEXOR XR) 37.5 MG extended release capsule         Plan:      Orders Placed This Encounter   Procedures    CBC With Auto Differential    Basic Metabolic Panel       Outpatient Encounter Medications as of 3/25/2021   Medication Sig Dispense Refill    venlafaxine (EFFEXOR XR) 37.5 MG extended release capsule Take 1 capsule by mouth daily 30 capsule 0    metoprolol succinate (TOPROL XL) 100 MG extended release tablet Take 1 tablet by mouth daily 90 tablet 1    gabapentin (NEURONTIN) 100 MG capsule Take 1 capsule by mouth daily as needed (neuropathy) for up to 90 days.  90 capsule 1    lisinopril (PRINIVIL;ZESTRIL) 10 MG tablet TAKE 1 TABLET BY MOUTH  DAILY 90 tablet 3    pantoprazole (PROTONIX) 20 MG tablet TAKE 1 TABLET BY MOUTH  DAILY 90 tablet 3    atorvastatin (LIPITOR) 40 MG tablet Take 1 tablet by mouth daily 90 tablet 1    clopidogrel (PLAVIX) 75 MG tablet Take 1 tablet by mouth daily 90 tablet 3    Cyanocobalamin (B-12) 1000 MCG SUBL Place 1 tablet under the tongue daily 90 tablet 5    BABY ASPIRIN PO Take by mouth Indications: take 3 times a week ( she stopped and I have asked she resume as it was recommended by Cardio)       Calcium Carbonate-Vit D-Min (CALCIUM 1200) 7968-0901 MG-UNIT CHEW Take by mouth      PARoxetine (PAXIL) 10 MG tablet Take 1 tablet by mouth daily (Patient not taking: Reported on 3/25/2021) 30 tablet 3    [DISCONTINUED] metoprolol succinate (TOPROL XL) 50 MG extended release tablet Take 1 tablet by mouth daily 90 tablet 1     No facility-administered encounter medications on file as of 3/25/2021.             Angel Kumari MD

## 2021-04-08 ENCOUNTER — OFFICE VISIT (OUTPATIENT)
Dept: FAMILY MEDICINE CLINIC | Age: 81
End: 2021-04-08
Payer: MEDICARE

## 2021-04-08 VITALS
HEIGHT: 60 IN | WEIGHT: 117.8 LBS | DIASTOLIC BLOOD PRESSURE: 65 MMHG | SYSTOLIC BLOOD PRESSURE: 134 MMHG | HEART RATE: 94 BPM | TEMPERATURE: 98.3 F | BODY MASS INDEX: 23.13 KG/M2 | OXYGEN SATURATION: 99 %

## 2021-04-08 DIAGNOSIS — K22.719 BARRETT'S ESOPHAGUS WITH DYSPLASIA: ICD-10-CM

## 2021-04-08 DIAGNOSIS — G47.09 OTHER INSOMNIA: ICD-10-CM

## 2021-04-08 DIAGNOSIS — F41.1 GAD (GENERALIZED ANXIETY DISORDER): ICD-10-CM

## 2021-04-08 DIAGNOSIS — M85.80 OSTEOPENIA, UNSPECIFIED LOCATION: ICD-10-CM

## 2021-04-08 DIAGNOSIS — K21.9 GASTROESOPHAGEAL REFLUX DISEASE, UNSPECIFIED WHETHER ESOPHAGITIS PRESENT: ICD-10-CM

## 2021-04-08 DIAGNOSIS — E55.9 VITAMIN D DEFICIENCY: ICD-10-CM

## 2021-04-08 DIAGNOSIS — M81.0 AGE-RELATED OSTEOPOROSIS WITHOUT CURRENT PATHOLOGICAL FRACTURE: ICD-10-CM

## 2021-04-08 DIAGNOSIS — G62.9 NEUROPATHY: ICD-10-CM

## 2021-04-08 DIAGNOSIS — D64.9 ANEMIA, UNSPECIFIED TYPE: Primary | ICD-10-CM

## 2021-04-08 DIAGNOSIS — I10 ESSENTIAL HYPERTENSION: ICD-10-CM

## 2021-04-08 DIAGNOSIS — Z87.11 HISTORY OF BLEEDING PEPTIC ULCER: ICD-10-CM

## 2021-04-08 DIAGNOSIS — Z86.73 HISTORY OF ARTERIAL ISCHEMIC STROKE: ICD-10-CM

## 2021-04-08 DIAGNOSIS — M99.79 NARROWING OF INTERVERTEBRAL DISC SPACE: ICD-10-CM

## 2021-04-08 PROBLEM — N18.2 STAGE 2 CHRONIC KIDNEY DISEASE: Status: RESOLVED | Noted: 2019-08-28 | Resolved: 2021-04-08

## 2021-04-08 PROCEDURE — 1036F TOBACCO NON-USER: CPT | Performed by: FAMILY MEDICINE

## 2021-04-08 PROCEDURE — G8399 PT W/DXA RESULTS DOCUMENT: HCPCS | Performed by: FAMILY MEDICINE

## 2021-04-08 PROCEDURE — 1090F PRES/ABSN URINE INCON ASSESS: CPT | Performed by: FAMILY MEDICINE

## 2021-04-08 PROCEDURE — 99213 OFFICE O/P EST LOW 20 MIN: CPT | Performed by: FAMILY MEDICINE

## 2021-04-08 PROCEDURE — G8420 CALC BMI NORM PARAMETERS: HCPCS | Performed by: FAMILY MEDICINE

## 2021-04-08 PROCEDURE — G8427 DOCREV CUR MEDS BY ELIG CLIN: HCPCS | Performed by: FAMILY MEDICINE

## 2021-04-08 PROCEDURE — 3288F FALL RISK ASSESSMENT DOCD: CPT | Performed by: FAMILY MEDICINE

## 2021-04-08 PROCEDURE — 1123F ACP DISCUSS/DSCN MKR DOCD: CPT | Performed by: FAMILY MEDICINE

## 2021-04-08 PROCEDURE — 4040F PNEUMOC VAC/ADMIN/RCVD: CPT | Performed by: FAMILY MEDICINE

## 2021-04-08 RX ORDER — VENLAFAXINE HYDROCHLORIDE 37.5 MG/1
37.5 CAPSULE, EXTENDED RELEASE ORAL DAILY
Qty: 90 CAPSULE | Refills: 0 | Status: SHIPPED | OUTPATIENT
Start: 2021-04-08 | End: 2021-06-10 | Stop reason: SDUPTHER

## 2021-04-08 ASSESSMENT — ENCOUNTER SYMPTOMS
BACK PAIN: 0
SHORTNESS OF BREATH: 0
COUGH: 0
COLOR CHANGE: 0
CONSTIPATION: 0
ABDOMINAL PAIN: 0
DIARRHEA: 0

## 2021-04-08 ASSESSMENT — PATIENT HEALTH QUESTIONNAIRE - PHQ9
SUM OF ALL RESPONSES TO PHQ QUESTIONS 1-9: 0
1. LITTLE INTEREST OR PLEASURE IN DOING THINGS: 0
SUM OF ALL RESPONSES TO PHQ QUESTIONS 1-9: 0
SUM OF ALL RESPONSES TO PHQ9 QUESTIONS 1 & 2: 0

## 2021-04-08 NOTE — PROGRESS NOTES
Subjective:      Patient ID: Sanju Bonilla is a 79 y.o. female. HPI   States overall feels a lot better , Sleep and anxiety is a lot better. States mood also better. Mild dry mouth- advised to use hard candy and sialogogues. Her Hb still low but Iron studies , B12, folate as well as renal functions in the last year has remained good. She does have H/O gastritis/barettes. BP is under control, no palpitations. GERD sx are controlled with current meds. Review of Systems   Constitutional: Negative for activity change, appetite change, chills, diaphoresis and fatigue. HENT: Negative for dental problem. Eyes: Negative for visual disturbance. Respiratory: Negative for cough and shortness of breath. Cardiovascular: Negative for chest pain and leg swelling. Gastrointestinal: Negative for abdominal pain, constipation and diarrhea. Endocrine: Negative for polydipsia, polyphagia and polyuria. Genitourinary: Negative for decreased urine volume. Musculoskeletal: Negative for arthralgias, back pain and myalgias. Skin: Negative for color change, pallor and rash. Neurological: Negative for dizziness, weakness, light-headedness and headaches. Psychiatric/Behavioral: Negative for behavioral problems, confusion and sleep disturbance. The patient is not nervous/anxious. Objective:   Physical Exam  Constitutional:       General: She is not in acute distress. HENT:      Head: Normocephalic and atraumatic. Right Ear: External ear normal.      Left Ear: External ear normal.   Eyes:      Conjunctiva/sclera: Conjunctivae normal.      Pupils: Pupils are equal, round, and reactive to light. Neck:      Musculoskeletal: Normal range of motion. Thyroid: No thyromegaly. Trachea: No tracheal deviation. Cardiovascular:      Rate and Rhythm: Normal rate and regular rhythm. Heart sounds: No murmur. No friction rub. No gallop. Pulmonary:      Effort: No respiratory distress. Breath sounds: No stridor. No wheezing or rales. Chest:      Chest wall: No tenderness. Abdominal:      General: Bowel sounds are normal. There is no distension. Palpations: Abdomen is soft. Tenderness: There is no abdominal tenderness. There is no rebound. Musculoskeletal: Normal range of motion. Lymphadenopathy:      Cervical: No cervical adenopathy. Skin:     General: Skin is warm. Coloration: Skin is not pale. Findings: No erythema or rash. Neurological:      Mental Status: She is alert and oriented to person, place, and time. Cranial Nerves: No cranial nerve deficit. Motor: No abnormal muscle tone. Deep Tendon Reflexes: Reflexes normal.         Assessment:       Diagnosis Orders   1. Anemia, unspecified type     2. History of bleeding peptic ulcer     3. History of arterial ischemic stroke     4. Osteopenia, unspecified location     5. Vitamin D deficiency     6. Gastroesophageal reflux disease, unspecified whether esophagitis present     7. Gaytan's esophagus with dysplasia     8. Essential hypertension     9. SARAH (generalized anxiety disorder)  venlafaxine (EFFEXOR XR) 37.5 MG extended release capsule   10. Other insomnia     11. Narrowing of intervertebral disc space     12. Neuropathy     13. Age-related osteoporosis without current pathological fracture           Plan:      No orders of the defined types were placed in this encounter. Outpatient Encounter Medications as of 4/8/2021   Medication Sig Dispense Refill    venlafaxine (EFFEXOR XR) 37.5 MG extended release capsule Take 1 capsule by mouth daily 90 capsule 0    metoprolol succinate (TOPROL XL) 100 MG extended release tablet Take 1 tablet by mouth daily 90 tablet 1    gabapentin (NEURONTIN) 100 MG capsule Take 1 capsule by mouth daily as needed (neuropathy) for up to 90 days.  90 capsule 1    lisinopril (PRINIVIL;ZESTRIL) 10 MG tablet TAKE 1 TABLET BY MOUTH  DAILY 90 tablet 3    pantoprazole

## 2021-06-01 ENCOUNTER — TELEPHONE (OUTPATIENT)
Dept: FAMILY MEDICINE CLINIC | Age: 81
End: 2021-06-01

## 2021-06-01 NOTE — TELEPHONE ENCOUNTER
Pt called and would like a rx called in for a cold and runny nose pt had it for 2 weeks and it just will not go away she want  [facility] [address] please advise

## 2021-06-10 DIAGNOSIS — F41.1 GAD (GENERALIZED ANXIETY DISORDER): ICD-10-CM

## 2021-06-10 RX ORDER — VENLAFAXINE HYDROCHLORIDE 37.5 MG/1
37.5 CAPSULE, EXTENDED RELEASE ORAL DAILY
Qty: 90 CAPSULE | Refills: 0 | Status: SHIPPED | OUTPATIENT
Start: 2021-06-10 | End: 2021-07-20

## 2021-07-03 DIAGNOSIS — E78.5 DYSLIPIDEMIA: ICD-10-CM

## 2021-07-03 DIAGNOSIS — Z86.73 HISTORY OF ARTERIAL ISCHEMIC STROKE: ICD-10-CM

## 2021-07-06 RX ORDER — ATORVASTATIN CALCIUM 40 MG/1
40 TABLET, FILM COATED ORAL DAILY
Qty: 90 TABLET | Refills: 3 | Status: SHIPPED | OUTPATIENT
Start: 2021-07-06 | End: 2021-07-15 | Stop reason: SDUPTHER

## 2021-07-06 RX ORDER — CLOPIDOGREL BISULFATE 75 MG/1
75 TABLET ORAL DAILY
Qty: 90 TABLET | Refills: 3 | OUTPATIENT
Start: 2021-07-06

## 2021-07-15 ENCOUNTER — OFFICE VISIT (OUTPATIENT)
Dept: FAMILY MEDICINE CLINIC | Age: 81
End: 2021-07-15
Payer: MEDICARE

## 2021-07-15 VITALS
HEART RATE: 59 BPM | TEMPERATURE: 97.2 F | HEIGHT: 60 IN | WEIGHT: 119.2 LBS | BODY MASS INDEX: 23.4 KG/M2 | OXYGEN SATURATION: 98 % | SYSTOLIC BLOOD PRESSURE: 139 MMHG | DIASTOLIC BLOOD PRESSURE: 78 MMHG

## 2021-07-15 DIAGNOSIS — F41.1 GAD (GENERALIZED ANXIETY DISORDER): ICD-10-CM

## 2021-07-15 DIAGNOSIS — I10 ESSENTIAL HYPERTENSION: Primary | ICD-10-CM

## 2021-07-15 DIAGNOSIS — G56.92 NEUROPATHY OF LEFT UPPER EXTREMITY: ICD-10-CM

## 2021-07-15 DIAGNOSIS — K22.719 BARRETT'S ESOPHAGUS WITH DYSPLASIA: ICD-10-CM

## 2021-07-15 DIAGNOSIS — Z13.29 SCREENING FOR THYROID DISORDER: ICD-10-CM

## 2021-07-15 DIAGNOSIS — E78.5 DYSLIPIDEMIA: ICD-10-CM

## 2021-07-15 DIAGNOSIS — M81.0 AGE-RELATED OSTEOPOROSIS WITHOUT CURRENT PATHOLOGICAL FRACTURE: ICD-10-CM

## 2021-07-15 DIAGNOSIS — R73.9 HYPERGLYCEMIA: ICD-10-CM

## 2021-07-15 DIAGNOSIS — E28.319 EARLY MENOPAUSE: ICD-10-CM

## 2021-07-15 DIAGNOSIS — K21.9 GASTROESOPHAGEAL REFLUX DISEASE, UNSPECIFIED WHETHER ESOPHAGITIS PRESENT: ICD-10-CM

## 2021-07-15 DIAGNOSIS — E53.8 B12 DEFICIENCY: ICD-10-CM

## 2021-07-15 DIAGNOSIS — M81.0 SENILE OSTEOPOROSIS: ICD-10-CM

## 2021-07-15 DIAGNOSIS — E55.9 VITAMIN D DEFICIENCY: ICD-10-CM

## 2021-07-15 PROCEDURE — 1090F PRES/ABSN URINE INCON ASSESS: CPT | Performed by: FAMILY MEDICINE

## 2021-07-15 PROCEDURE — G8420 CALC BMI NORM PARAMETERS: HCPCS | Performed by: FAMILY MEDICINE

## 2021-07-15 PROCEDURE — 3288F FALL RISK ASSESSMENT DOCD: CPT | Performed by: FAMILY MEDICINE

## 2021-07-15 PROCEDURE — 1036F TOBACCO NON-USER: CPT | Performed by: FAMILY MEDICINE

## 2021-07-15 PROCEDURE — 99214 OFFICE O/P EST MOD 30 MIN: CPT | Performed by: FAMILY MEDICINE

## 2021-07-15 PROCEDURE — 4040F PNEUMOC VAC/ADMIN/RCVD: CPT | Performed by: FAMILY MEDICINE

## 2021-07-15 PROCEDURE — 1123F ACP DISCUSS/DSCN MKR DOCD: CPT | Performed by: FAMILY MEDICINE

## 2021-07-15 PROCEDURE — G8399 PT W/DXA RESULTS DOCUMENT: HCPCS | Performed by: FAMILY MEDICINE

## 2021-07-15 PROCEDURE — G8427 DOCREV CUR MEDS BY ELIG CLIN: HCPCS | Performed by: FAMILY MEDICINE

## 2021-07-15 RX ORDER — ATORVASTATIN CALCIUM 40 MG/1
40 TABLET, FILM COATED ORAL DAILY
Qty: 90 TABLET | Refills: 1 | Status: SHIPPED | OUTPATIENT
Start: 2021-07-15 | End: 2021-11-10 | Stop reason: SDUPTHER

## 2021-07-15 RX ORDER — GABAPENTIN 100 MG/1
100 CAPSULE ORAL DAILY
Qty: 90 CAPSULE | Refills: 0 | Status: SHIPPED | OUTPATIENT
Start: 2021-07-15 | End: 2021-10-04

## 2021-07-15 RX ORDER — METOPROLOL SUCCINATE 100 MG/1
100 TABLET, EXTENDED RELEASE ORAL DAILY
Qty: 90 TABLET | Refills: 1 | Status: SHIPPED | OUTPATIENT
Start: 2021-07-15 | End: 2021-11-10 | Stop reason: DRUGHIGH

## 2021-07-15 RX ORDER — DENOSUMAB 60 MG/ML
60 INJECTION SUBCUTANEOUS ONCE
Qty: 1 ML | Refills: 5 | Status: SHIPPED | OUTPATIENT
Start: 2021-07-15 | End: 2021-07-15

## 2021-07-15 RX ORDER — LISINOPRIL 10 MG/1
10 TABLET ORAL DAILY
Qty: 90 TABLET | Refills: 1 | Status: SHIPPED | OUTPATIENT
Start: 2021-07-15 | End: 2021-11-10 | Stop reason: DRUGHIGH

## 2021-07-15 RX ORDER — VENLAFAXINE HYDROCHLORIDE 37.5 MG/1
37.5 CAPSULE, EXTENDED RELEASE ORAL DAILY
Qty: 90 CAPSULE | Refills: 1 | Status: SHIPPED | OUTPATIENT
Start: 2021-07-15 | End: 2021-10-18 | Stop reason: SINTOL

## 2021-07-15 ASSESSMENT — ENCOUNTER SYMPTOMS
ABDOMINAL PAIN: 0
EYE DISCHARGE: 0
COLOR CHANGE: 0
RECTAL PAIN: 0
DIARRHEA: 0
SINUS PRESSURE: 0
SHORTNESS OF BREATH: 0
CHEST TIGHTNESS: 0
BLOOD IN STOOL: 0
VOMITING: 0
VOICE CHANGE: 0
ANAL BLEEDING: 0
BACK PAIN: 0
EYE PAIN: 0
EYE REDNESS: 0
NAUSEA: 0
COUGH: 0
ABDOMINAL DISTENTION: 0
TROUBLE SWALLOWING: 0
CONSTIPATION: 0

## 2021-07-15 ASSESSMENT — PATIENT HEALTH QUESTIONNAIRE - PHQ9
1. LITTLE INTEREST OR PLEASURE IN DOING THINGS: 0
1. LITTLE INTEREST OR PLEASURE IN DOING THINGS: 0
SUM OF ALL RESPONSES TO PHQ QUESTIONS 1-9: 0
SUM OF ALL RESPONSES TO PHQ9 QUESTIONS 1 & 2: 0
SUM OF ALL RESPONSES TO PHQ QUESTIONS 1-9: 0
SUM OF ALL RESPONSES TO PHQ9 QUESTIONS 1 & 2: 0
2. FEELING DOWN, DEPRESSED OR HOPELESS: 0
SUM OF ALL RESPONSES TO PHQ QUESTIONS 1-9: 0
2. FEELING DOWN, DEPRESSED OR HOPELESS: 0
SUM OF ALL RESPONSES TO PHQ QUESTIONS 1-9: 0

## 2021-07-15 NOTE — PROGRESS NOTES
Subjective:      Patient ID: Patricia Dwyer is a [de-identified] y.o. female. States has been doing ok except daughter was in ICU at [facility] due to sepsis, intubated x 9 days. Had a diag of Lung Ca and Bone mets. Sleep, mood, appetite ok. Bowels and bladder ok. Was in [facility] for stroke sx- was placed on statin, BP meds and asa- ,saw cardio Dr Melo Williamson. lasrt seen this year and advised to cont plavix and asa as well as statin. US carotids done - stable . Also saw Dr Stevenson Reece for GERD and advised to cont PPI up to BID if needed. H/O osteoporosis states never got a shot though I referred her x 2  Review of Systems   Constitutional: Negative for activity change, appetite change and fatigue. HENT: Negative for dental problem, ear pain, hearing loss, postnasal drip, sinus pressure, sneezing, tinnitus, trouble swallowing and voice change. Eyes: Negative for pain, discharge, redness and visual disturbance. Respiratory: Negative for cough, chest tightness and shortness of breath. Cardiovascular: Negative for chest pain, palpitations and leg swelling. Gastrointestinal: Negative for abdominal distention, abdominal pain, anal bleeding, blood in stool, constipation, diarrhea, nausea, rectal pain and vomiting. Endocrine: Negative for cold intolerance, heat intolerance, polydipsia, polyphagia and polyuria. Genitourinary: Negative for decreased urine volume, difficulty urinating, dyspareunia, dysuria, enuresis, flank pain, frequency, genital sores, hematuria, menstrual problem, pelvic pain, urgency, vaginal bleeding and vaginal discharge. Musculoskeletal: Negative for arthralgias, back pain, gait problem, joint swelling, myalgias, neck pain and neck stiffness. Skin: Negative for color change, pallor and rash. Allergic/Immunologic: Negative for environmental allergies, food allergies and immunocompromised state.    Neurological: Negative for dizziness, tremors, seizures, syncope, facial asymmetry, speech difficulty, weakness, light-headedness, numbness and headaches. Hematological: Negative for adenopathy. Does not bruise/bleed easily. Psychiatric/Behavioral: Negative for agitation, behavioral problems, confusion, decreased concentration, sleep disturbance and suicidal ideas. The patient is not nervous/anxious. Objective:   Physical Exam  Constitutional:       General: She is not in acute distress. Appearance: Normal appearance. HENT:      Head: Normocephalic and atraumatic. Right Ear: External ear normal.      Left Ear: External ear normal.      Mouth/Throat:      Mouth: Mucous membranes are moist.   Eyes:      Conjunctiva/sclera: Conjunctivae normal.      Pupils: Pupils are equal, round, and reactive to light. Neck:      Thyroid: No thyromegaly. Trachea: No tracheal deviation. Cardiovascular:      Rate and Rhythm: Normal rate and regular rhythm. Pulses: Normal pulses. Heart sounds: Normal heart sounds. No murmur heard. No friction rub. No gallop. Pulmonary:      Effort: Pulmonary effort is normal. No respiratory distress. Breath sounds: Normal breath sounds. No stridor. No wheezing or rales. Chest:      Chest wall: No tenderness. Abdominal:      General: Bowel sounds are normal. There is no distension. Palpations: Abdomen is soft. Tenderness: There is no abdominal tenderness. There is no rebound. Musculoskeletal:         General: Normal range of motion. Cervical back: Normal range of motion. Lymphadenopathy:      Cervical: No cervical adenopathy. Skin:     General: Skin is warm. Coloration: Skin is not pale. Findings: No erythema or rash. Neurological:      General: No focal deficit present. Mental Status: She is alert and oriented to person, place, and time. Mental status is at baseline. Cranial Nerves: No cranial nerve deficit. Motor: No abnormal muscle tone.       Deep Tendon Reflexes: Reflexes normal. Psychiatric:         Mood and Affect: Mood normal.         Behavior: Behavior normal.         Thought Content: Thought content normal.         Judgment: Judgment normal.          Vitals:    07/15/21 1007   BP: (!) 158/77   Pulse: 59   Temp:    SpO2:          Assessment:      Diagnosis Orders   1. Essential hypertension     2. SARAH (generalized anxiety disorder)     3. Age-related osteoporosis without current pathological fracture     4. B12 deficiency     5. Vitamin D deficiency     6. Gaytan's esophagus with dysplasia     7. Gastroesophageal reflux disease, unspecified whether esophagitis present     8. Early menopause     9. Senile osteoporosis           Plan:     No orders of the defined types were placed in this encounter. Outpatient Encounter Medications as of 7/15/2021   Medication Sig Dispense Refill    atorvastatin (LIPITOR) 40 MG tablet TAKE 1 TABLET BY MOUTH  DAILY 90 tablet 3    venlafaxine (EFFEXOR XR) 37.5 MG extended release capsule Take 1 capsule by mouth daily 90 capsule 0    metoprolol succinate (TOPROL XL) 100 MG extended release tablet Take 1 tablet by mouth daily 90 tablet 1    lisinopril (PRINIVIL;ZESTRIL) 10 MG tablet TAKE 1 TABLET BY MOUTH  DAILY 90 tablet 3    pantoprazole (PROTONIX) 20 MG tablet TAKE 1 TABLET BY MOUTH  DAILY 90 tablet 3    clopidogrel (PLAVIX) 75 MG tablet Take 1 tablet by mouth daily 90 tablet 3    Cyanocobalamin (B-12) 1000 MCG SUBL Place 1 tablet under the tongue daily 90 tablet 5    BABY ASPIRIN PO Take by mouth Indications: take 3 times a week ( she stopped and I have asked she resume as it was recommended by Cardio)       Calcium Carbonate-Vit D-Min (CALCIUM 1200) 3672-8897 MG-UNIT CHEW Take by mouth      gabapentin (NEURONTIN) 100 MG capsule Take 1 capsule by mouth daily as needed (neuropathy) for up to 90 days. 90 capsule 1     No facility-administered encounter medications on file as of 7/15/2021.      Advised to call Dr Steffi Delarosa office to schedule an EGD as she has not had one for over 4 years- has H/O Barettes- patient will. Call Dr Luz Diaz and refill plavix and schedule follow up as well. Was started on gabapentin for left arm neuropathy in [place]- tried to stop but sx came back so restarted, controlled on 100 mg a day  15 minutes spent with patient counseling/educating on acute/chronic medical health problems, medications,  along with treatment options. Reviewed multiple labs/imaging/medications with patient during this time. Following Diet discussion/education was done on Dash Diet. In addition Exercise plan and depression screen were discussed. Recent labs/imaging were discussed and reviewed with patient.

## 2021-07-16 ENCOUNTER — TELEPHONE (OUTPATIENT)
Dept: INFUSION THERAPY | Facility: MEDICAL CENTER | Age: 81
End: 2021-07-16

## 2021-07-16 NOTE — TELEPHONE ENCOUNTER
New order per Dr. Beth Zee MD    Prolia 60 mg SQ every 6 months    Serum Calcium prior to injection. Order noted and to front office for scheduling.

## 2021-07-19 DIAGNOSIS — Z86.73 HISTORY OF ARTERIAL ISCHEMIC STROKE: ICD-10-CM

## 2021-07-20 RX ORDER — CLOPIDOGREL BISULFATE 75 MG/1
75 TABLET ORAL DAILY
Qty: 90 TABLET | Refills: 3 | Status: SHIPPED | OUTPATIENT
Start: 2021-07-20

## 2021-07-22 ENCOUNTER — TELEPHONE (OUTPATIENT)
Dept: INFUSION THERAPY | Facility: MEDICAL CENTER | Age: 81
End: 2021-07-22

## 2021-07-22 NOTE — TELEPHONE ENCOUNTER
I TRIED TO CALL [facility] TO SCHEDULE HER PROLIA INJECTION & LAB WORK AND HAD TO LEAVE A MESSAGE TO CALL THE OFFICE TO SCHEDULE.

## 2021-07-27 NOTE — TELEPHONE ENCOUNTER
I TRIED  TO CALL [facility] TO SCHEDULE HER LABS AND PROLIA INJECTION AND HAD TO LEAVE A MESSAGE TO CALL THE OFFICE TO SCHEDULE.

## 2021-08-03 NOTE — TELEPHONE ENCOUNTER
I TRIED TO CALL [facility] TO SCHEDULE HER LABS AND PROLIA AND HAD TO LEAVE A MESSAGE TO CALL THE OFFICE TO SCHEDULE.

## 2021-08-11 NOTE — TELEPHONE ENCOUNTER
I AM GOING TO SEND A NOTE TO [facility] THAT I HAVE BEEN TRYING TO GET A HOLD OF HER TO SCHEDULE HER [address]  AND LAB WORK. I WILL ALSO LET THE OFFICE KNOW  THAT I HAVE BEEN UNABLE TO CONTACT PT TO GET SCHEDULED BY SENDING THEM A FAX.

## 2021-09-09 ENCOUNTER — HOSPITAL ENCOUNTER (OUTPATIENT)
Age: 81
Discharge: HOME | End: 2021-09-09
Payer: MEDICARE

## 2021-09-09 DIAGNOSIS — E55.9 VITAMIN D DEFICIENCY: ICD-10-CM

## 2021-09-09 DIAGNOSIS — Z13.29 SCREENING FOR THYROID DISORDER: ICD-10-CM

## 2021-09-09 DIAGNOSIS — E53.8 B12 DEFICIENCY: ICD-10-CM

## 2021-09-09 DIAGNOSIS — R73.9 HYPERGLYCEMIA: ICD-10-CM

## 2021-09-09 DIAGNOSIS — E78.5 DYSLIPIDEMIA: ICD-10-CM

## 2021-09-09 LAB
ALBUMIN SERPL-MCNC: 4.6 G/DL (ref 3.5–5.2)
ALBUMIN/GLOBULIN RATIO: 2.3 (ref 1–2.5)
ALP BLD-CCNC: 74 U/L (ref 35–104)
ALT SERPL-CCNC: 15 U/L (ref 5–33)
ANION GAP SERPL CALCULATED.3IONS-SCNC: 12 MMOL/L (ref 9–17)
AST SERPL-CCNC: 23 U/L
BILIRUB SERPL-MCNC: 0.57 MG/DL (ref 0.3–1.2)
BUN BLDV-MCNC: 19 MG/DL (ref 8–23)
BUN/CREAT BLD: ABNORMAL (ref 9–20)
CALCIUM SERPL-MCNC: 9.7 MG/DL (ref 8.6–10.4)
CHLORIDE BLD-SCNC: 103 MMOL/L (ref 98–107)
CHOLESTEROL/HDL RATIO: 2.1
CHOLESTEROL: 240 MG/DL
CO2: 26 MMOL/L (ref 20–31)
CREAT SERPL-MCNC: 0.94 MG/DL (ref 0.5–0.9)
FOLATE: 11.6 NG/ML
GFR AFRICAN AMERICAN: >60 ML/MIN
GFR NON-AFRICAN AMERICAN: 57 ML/MIN
GFR SERPL CREATININE-BSD FRML MDRD: ABNORMAL ML/MIN/{1.73_M2}
GFR SERPL CREATININE-BSD FRML MDRD: ABNORMAL ML/MIN/{1.73_M2}
GLUCOSE BLD-MCNC: 86 MG/DL (ref 70–99)
HCT VFR BLD CALC: 38 % (ref 36.3–47.1)
HDLC SERPL-MCNC: 115 MG/DL
HEMOGLOBIN: 11.5 G/DL (ref 11.9–15.1)
LDL CHOLESTEROL: 106 MG/DL (ref 0–130)
MCH RBC QN AUTO: 29.7 PG (ref 25.2–33.5)
MCHC RBC AUTO-ENTMCNC: 30.3 G/DL (ref 28.4–34.8)
MCV RBC AUTO: 98.2 FL (ref 82.6–102.9)
NRBC AUTOMATED: 0 PER 100 WBC
PDW BLD-RTO: 12.8 % (ref 11.8–14.4)
PLATELET # BLD: 194 K/UL (ref 138–453)
PMV BLD AUTO: 10.4 FL (ref 8.1–13.5)
POTASSIUM SERPL-SCNC: 4.1 MMOL/L (ref 3.7–5.3)
RBC # BLD: 3.87 M/UL (ref 3.95–5.11)
SODIUM BLD-SCNC: 141 MMOL/L (ref 135–144)
THYROXINE, FREE: 1.19 NG/DL (ref 0.93–1.7)
TOTAL PROTEIN: 6.6 G/DL (ref 6.4–8.3)
TRIGL SERPL-MCNC: 97 MG/DL
TSH SERPL DL<=0.05 MIU/L-ACNC: 0.79 MIU/L (ref 0.3–5)
VITAMIN B-12: >2000 PG/ML (ref 232–1245)
VITAMIN D 25-HYDROXY: 34.4 NG/ML (ref 30–100)
VLDLC SERPL CALC-MCNC: ABNORMAL MG/DL (ref 1–30)
WBC # BLD: 4.7 K/UL (ref 3.5–11.3)

## 2021-09-09 PROCEDURE — 36415 COLL VENOUS BLD VENIPUNCTURE: CPT

## 2021-09-09 PROCEDURE — 84439 ASSAY OF FREE THYROXINE: CPT

## 2021-09-09 PROCEDURE — 82746 ASSAY OF FOLIC ACID SERUM: CPT

## 2021-09-09 PROCEDURE — 82306 VITAMIN D 25 HYDROXY: CPT

## 2021-09-09 PROCEDURE — 83036 HEMOGLOBIN GLYCOSYLATED A1C: CPT

## 2021-09-09 PROCEDURE — 82607 VITAMIN B-12: CPT

## 2021-09-09 PROCEDURE — 84443 ASSAY THYROID STIM HORMONE: CPT

## 2021-09-09 PROCEDURE — 85027 COMPLETE CBC AUTOMATED: CPT

## 2021-09-09 PROCEDURE — 80053 COMPREHEN METABOLIC PANEL: CPT

## 2021-09-09 PROCEDURE — 80061 LIPID PANEL: CPT

## 2021-09-12 LAB
ESTIMATED AVERAGE GLUCOSE: 105 MG/DL
HBA1C MFR BLD: 5.3 % (ref 4–6)

## 2021-10-10 ENCOUNTER — HOSPITAL ENCOUNTER (EMERGENCY)
Age: 81
Discharge: HOME | End: 2021-10-10
Attending: EMERGENCY MEDICINE
Payer: MEDICARE

## 2021-10-10 ENCOUNTER — APPOINTMENT (OUTPATIENT)
Dept: CT IMAGING | Age: 81
End: 2021-10-10
Payer: MEDICARE

## 2021-10-10 ENCOUNTER — APPOINTMENT (OUTPATIENT)
Dept: GENERAL RADIOLOGY | Age: 81
End: 2021-10-10
Payer: MEDICARE

## 2021-10-10 VITALS
HEIGHT: 64 IN | RESPIRATION RATE: 10 BRPM | BODY MASS INDEX: 18.78 KG/M2 | WEIGHT: 110 LBS | OXYGEN SATURATION: 99 % | DIASTOLIC BLOOD PRESSURE: 86 MMHG | HEART RATE: 66 BPM | SYSTOLIC BLOOD PRESSURE: 170 MMHG | TEMPERATURE: 97.7 F

## 2021-10-10 DIAGNOSIS — R42 DIZZINESS: Primary | ICD-10-CM

## 2021-10-10 DIAGNOSIS — R00.2 PALPITATIONS: ICD-10-CM

## 2021-10-10 DIAGNOSIS — R03.0 ELEVATED BLOOD PRESSURE READING: ICD-10-CM

## 2021-10-10 LAB
ABSOLUTE EOS #: 0.12 K/UL (ref 0–0.44)
ABSOLUTE IMMATURE GRANULOCYTE: 0.02 K/UL (ref 0–0.3)
ABSOLUTE LYMPH #: 0.97 K/UL (ref 1.1–3.7)
ABSOLUTE MONO #: 0.4 K/UL (ref 0.1–1.2)
ANION GAP SERPL CALCULATED.3IONS-SCNC: 13 MMOL/L (ref 9–17)
BASOPHILS # BLD: 1 % (ref 0–2)
BASOPHILS ABSOLUTE: 0.03 K/UL (ref 0–0.2)
BUN BLDV-MCNC: 17 MG/DL (ref 8–23)
BUN/CREAT BLD: 16 (ref 9–20)
CALCIUM SERPL-MCNC: 9.4 MG/DL (ref 8.6–10.4)
CHLORIDE BLD-SCNC: 110 MMOL/L (ref 98–107)
CO2: 22 MMOL/L (ref 20–31)
CREAT SERPL-MCNC: 1.04 MG/DL (ref 0.5–0.9)
DIFFERENTIAL TYPE: ABNORMAL
EOSINOPHILS RELATIVE PERCENT: 2 % (ref 1–4)
GFR AFRICAN AMERICAN: >60 ML/MIN
GFR NON-AFRICAN AMERICAN: 51 ML/MIN
GFR SERPL CREATININE-BSD FRML MDRD: ABNORMAL ML/MIN/{1.73_M2}
GFR SERPL CREATININE-BSD FRML MDRD: ABNORMAL ML/MIN/{1.73_M2}
GLUCOSE BLD-MCNC: 121 MG/DL (ref 70–99)
HCT VFR BLD CALC: 38.4 % (ref 36.3–47.1)
HEMOGLOBIN: 11.8 G/DL (ref 11.9–15.1)
IMMATURE GRANULOCYTES: 0 %
LYMPHOCYTES # BLD: 19 % (ref 24–43)
MCH RBC QN AUTO: 29.9 PG (ref 25.2–33.5)
MCHC RBC AUTO-ENTMCNC: 30.7 G/DL (ref 28.4–34.8)
MCV RBC AUTO: 97.5 FL (ref 82.6–102.9)
MONOCYTES # BLD: 8 % (ref 3–12)
MYOGLOBIN: 37 NG/ML (ref 25–58)
MYOGLOBIN: 41 NG/ML (ref 25–58)
NRBC AUTOMATED: ABNORMAL PER 100 WBC
PDW BLD-RTO: 13.1 % (ref 11.8–14.4)
PLATELET # BLD: 176 K/UL (ref 138–453)
PLATELET ESTIMATE: ABNORMAL
PMV BLD AUTO: 10.3 FL (ref 8.1–13.5)
POTASSIUM SERPL-SCNC: 3.7 MMOL/L (ref 3.7–5.3)
RBC # BLD: 3.94 M/UL (ref 3.95–5.11)
RBC # BLD: ABNORMAL 10*6/UL
SEG NEUTROPHILS: 70 % (ref 36–65)
SEGMENTED NEUTROPHILS ABSOLUTE COUNT: 3.59 K/UL (ref 1.5–8.1)
SODIUM BLD-SCNC: 145 MMOL/L (ref 135–144)
TROPONIN INTERP: ABNORMAL
TROPONIN INTERP: NORMAL
TROPONIN T: ABNORMAL NG/ML
TROPONIN T: NORMAL NG/ML
TROPONIN, HIGH SENSITIVITY: 13 NG/L (ref 0–14)
TROPONIN, HIGH SENSITIVITY: 17 NG/L (ref 0–14)
WBC # BLD: 5.1 K/UL (ref 3.5–11.3)
WBC # BLD: ABNORMAL 10*3/UL

## 2021-10-10 PROCEDURE — 83874 ASSAY OF MYOGLOBIN: CPT

## 2021-10-10 PROCEDURE — 93005 ELECTROCARDIOGRAM TRACING: CPT | Performed by: PHYSICIAN ASSISTANT

## 2021-10-10 PROCEDURE — 6370000000 HC RX 637 (ALT 250 FOR IP): Performed by: PHYSICIAN ASSISTANT

## 2021-10-10 PROCEDURE — 70450 CT HEAD/BRAIN W/O DYE: CPT

## 2021-10-10 PROCEDURE — 80048 BASIC METABOLIC PNL TOTAL CA: CPT

## 2021-10-10 PROCEDURE — 96374 THER/PROPH/DIAG INJ IV PUSH: CPT

## 2021-10-10 PROCEDURE — 2500000003 HC RX 250 WO HCPCS: Performed by: PHYSICIAN ASSISTANT

## 2021-10-10 PROCEDURE — 85025 COMPLETE CBC W/AUTO DIFF WBC: CPT

## 2021-10-10 PROCEDURE — 84484 ASSAY OF TROPONIN QUANT: CPT

## 2021-10-10 PROCEDURE — 99284 EMERGENCY DEPT VISIT MOD MDM: CPT

## 2021-10-10 PROCEDURE — 71045 X-RAY EXAM CHEST 1 VIEW: CPT

## 2021-10-10 RX ORDER — MECLIZINE HCL 12.5 MG/1
25 TABLET ORAL ONCE
Status: COMPLETED | OUTPATIENT
Start: 2021-10-10 | End: 2021-10-10

## 2021-10-10 RX ORDER — MECLIZINE HYDROCHLORIDE 25 MG/1
25 TABLET ORAL 3 TIMES DAILY PRN
Qty: 20 TABLET | Refills: 0 | Status: SHIPPED | OUTPATIENT
Start: 2021-10-10 | End: 2021-10-18 | Stop reason: DRUGHIGH

## 2021-10-10 RX ORDER — CLONIDINE HYDROCHLORIDE 0.1 MG/1
0.1 TABLET ORAL ONCE
Status: DISCONTINUED | OUTPATIENT
Start: 2021-10-10 | End: 2021-10-10

## 2021-10-10 RX ORDER — ACETAMINOPHEN 500 MG
1000 TABLET ORAL ONCE
Status: COMPLETED | OUTPATIENT
Start: 2021-10-10 | End: 2021-10-10

## 2021-10-10 RX ORDER — LABETALOL HYDROCHLORIDE 5 MG/ML
10 INJECTION, SOLUTION INTRAVENOUS ONCE
Status: COMPLETED | OUTPATIENT
Start: 2021-10-10 | End: 2021-10-10

## 2021-10-10 RX ADMIN — MECLIZINE 25 MG: 12.5 TABLET ORAL at 13:54

## 2021-10-10 RX ADMIN — LABETALOL HYDROCHLORIDE 10 MG: 5 INJECTION INTRAVENOUS at 14:21

## 2021-10-10 RX ADMIN — ACETAMINOPHEN 1000 MG: 500 TABLET ORAL at 13:55

## 2021-10-10 ASSESSMENT — PAIN DESCRIPTION - FREQUENCY: FREQUENCY: CONTINUOUS

## 2021-10-10 ASSESSMENT — PAIN SCALES - GENERAL
PAINLEVEL_OUTOF10: 9
PAINLEVEL_OUTOF10: 7

## 2021-10-10 ASSESSMENT — PAIN DESCRIPTION - DESCRIPTORS: DESCRIPTORS: THROBBING;PRESSURE

## 2021-10-10 ASSESSMENT — PAIN DESCRIPTION - LOCATION: LOCATION: CHEST;ARM

## 2021-10-10 ASSESSMENT — PAIN DESCRIPTION - ORIENTATION: ORIENTATION: LEFT

## 2021-10-10 NOTE — ED PROVIDER NOTES
[address] [place] [address] ED  [facility]      Pt Name: Anthony Arizmendi  MRN: [id number]  Cryst[place]fmirtha 1940  Date ofevaluation: 10/10/2021  Provider: Shayna Coley PA-C    CHIEF COMPLAINT       Chief Complaint   Patient presents with    Hypertension    Numbness     arm and fingers since yesterday    Palpitations     states took an old ativan this am states did not help, hx of anxiety         HISTORY OF PRESENT ILLNESS  (Location/Symptom, Timing/Onset, Context/Setting, Quality, Duration, Modifying Factors, Severity.)   Anthony Arizmendi is a [de-identified] y.o. female who presents to the emergency department with dizziness described as a sense of movement and spinning that is worse with change in position. Symptoms started early yesterday morning when she woke up around 6 AM.  Also for fluctuating blood pressures. Symptoms which has happened to her in the past and was secondary to anxiety. Does report some left arm tingling. This is not new has been going on for very long time and she takes gabapentin for. She denies chest pain shortness of breath. Nursing Notes were reviewed.     ALLERGIES     Clarithromycin, Levaquin  [levofloxacin in d5w], Levofloxacin, and Sertraline    CURRENT MEDICATIONS       Discharge Medication List as of 10/10/2021  3:20 PM      CONTINUE these medications which have NOT CHANGED    Details   gabapentin (NEURONTIN) 100 MG capsule TAKE 1 CAPSULE BY MOUTH  DAILY, Disp-90 capsule, R-0Normal      clopidogrel (PLAVIX) 75 MG tablet TAKE 1 TABLET BY MOUTH  DAILY, Disp-90 tablet, R-3Requesting 1 year supplyNormal      metoprolol succinate (TOPROL XL) 100 MG extended release tablet Take 1 tablet by mouth daily, Disp-90 tablet, R-1Normal      lisinopril (PRINIVIL;ZESTRIL) 10 MG tablet Take 1 tablet by mouth daily, Disp-90 tablet, R-1Normal      venlafaxine (EFFEXOR XR) 37.5 MG extended release capsule Take 1 capsule by mouth daily, Disp-90 capsule, R-1Normal      atorvastatin (LIPITOR) 40 MG tablet Take 1 tablet by mouth daily, Disp-90 tablet, R-1Normal      pantoprazole (PROTONIX) 20 MG tablet TAKE 1 TABLET BY MOUTH  DAILY, Disp-90 tablet, R-3Requesting 1 year supplyNormal      Cyanocobalamin (B-12) 1000 MCG SUBL Place 1 tablet under the tongue daily, Disp-90 tablet, R-5Normal      BABY ASPIRIN PO Take by mouth Indications: take 3 times a week ( she stopped and I have asked she resume as it was recommended by Cardio) Historical Med      Calcium Carbonate-Vit D-Min (CALCIUM 1200) 5058-5793 MG-UNIT CHEW Take by mouthHistorical Med      denosumab (PROLIA) 60 MG/ML SOSY SC injection Inject 1 mL into the skin once for 1 dose, Disp-1 mL, R-5Normal             PAST MEDICAL HISTORY         Diagnosis Date    Dyslipidemia 8/15/2019    Hypertension     Osteopenia 2020    Osteopenia 2020       SURGICAL HISTORY           Procedure Laterality Date    APPENDECTOMY      CHOLECYSTECTOMY      HYSTERECTOMY, TOTAL ABDOMINAL      TONSILLECTOMY           FAMILY HISTORY     History reviewed. No pertinent family history. Family Status   Relation Name Status    Mother      Father          SOCIAL HISTORY      reports that she quit smoking about 32 years ago. She has a 15.00 pack-year smoking history. She has never used smokeless tobacco. She reports current alcohol use. She reports that she does not use drugs. REVIEW OFSYSTEMS    (2-9 systems for level 4, 10 or more for level 5)   Review of Systems    Except as noted above the remainder of the review of systems was reviewed and negative. PHYSICAL EXAM    (up to 7 for level 4, 8 or more for level 5)     ED Triage Vitals [10/10/21 1143]   BP Temp Temp Source Pulse Resp SpO2 Height Weight   (!) 179/81 97.7 °F (36.5 °C) Oral 78 16 99 % 5' 4\" (1.626 m) 110 lb (49.9 kg)      Physical Exam  Constitutional:       Appearance: She is well-developed. HENT:      Head: Normocephalic and atraumatic.    Cardiovascular:      Rate and Rhythm: dictation. EMERGENCY DEPARTMENT COURSE and DIFFERENTIAL DIAGNOSIS/MDM:   Vitals:    Vitals:    10/10/21 1400 10/10/21 1415 10/10/21 1500 10/10/21 1515   BP: (!) 176/83 (!) 183/70 (!) 158/81 (!) 170/86   Pulse: 60 59 69 66   Resp: 13 14 13 10   Temp:       TempSrc:       SpO2: 99% 100% 98% 99%   Weight:       Height:         Patient will be discharged home. Patient is agreeable plan of care. Symptoms seem to be vertigo-like in nature. Patient did respond well to IV labetalol. 2 sets of cardiac enzymes are negative. She has discussed appoint with her PCP in the morning. She will keep an appointment and follow with her doctor. Given Antivert. CONSULTS:  None    PROCEDURES:  Procedures    CRITICAL CARE TIME     Due to the high probability of sudden and clinically significant deterioration in the patient's condition she required highest level of my preparedness to intervene urgently. I provided critical care time including documentation time, medication orders and management, reevaluation, vital sign assessment, ordering and reviewing of of lab tests ordering and reviewing of x-ray studies, and admission orders. Aggregate critical care time is between 35  minutes including only time during which I was engaged in work directly related to her care and did not include time spent treating other patients simultaneously. FINAL IMPRESSION      1. Dizziness    2. Elevated blood pressure reading    3.  Palpitations          DISPOSITION/PLAN   DISPOSITION Decision To Discharge 10/10/2021 03:19:51 PM      PATIENTREFERRED TO:   Roman Brennan MD  [address] [place] [address]  [phone]    In 1 day        DISCHARGE MEDICATIONS:     Discharge Medication List as of 10/10/2021  3:20 PM      START taking these medications    Details   meclizine (ANTIVERT) 25 MG tablet Take 1 tablet by mouth 3 times daily as needed for Dizziness, Disp-20 tablet, R-0Normal                 (Please note that portions of this note were completed with a voice recognition program.  Efforts were made to edit thedictations but occasionally words are mis-transcribed.)    KAYLA Israel PA-C  10/10/21 9033

## 2021-10-10 NOTE — ED NOTES
Labetalol IV given for pressure will monitor for effectiveness.      Florentin Vasquez RN  10/10/21 8421

## 2021-10-10 NOTE — ED PROVIDER NOTES
EMERGENCY DEPARTMENT ENCOUNTER   ATTENDING ATTESTATION     Pt Name: Faith Dooley  MRN: [id number]  Crystalgfmirtha 1940  Date of evaluation: 10/10/21   Faith Dooley is a [de-identified] y.o. female with CC: Hypertension, Numbness (arm and fingers since yesterday), and Palpitations (states took an old ativan this am states did not help, hx of anxiety)    MDM: 81-year-old female presents emergency department with lightheadedness, ringing in her ears, double vision, chest palpitations, elevated blood pressure for the past 24 hours. She has a history of vertigo and states that this feels similar but somewhat little bit worse. She has chronic numbness and tingling in her hands that is most pronounced in the morning but this has not changed from baseline for her. Is also had some ringing in her ears which has been existing with vertigo for her in the past.  He does admit to taking a Ativan this morning that was old and prescribed a while ago should not help with her symptoms, however states in the past never she has had vertigo Ativan has typically helped. She also has a noted history of anxiety. States that she has seen a cardiologist before and has had a negative stress test within the past 6 months, was told that she did not need to come back again for another year. However she cannot recall the name of his cardiologist.  She has not had any chest pain, shortness of breath, back pain, syncope, localized numbness or tingling, weakness. Initial evaluation she has no acute distress. Hypertension noted, however after she is allowed to rest the blood pressure does come down on its own and does come back up whenever another healthcare workers in the room believe that this is likely related to whitecoat syndrome. Heart regular rate and rhythm, lungs clear, no peripheral edema. Nonfocal neurological exam.  GCS 15, ANO x4, NIH is 0. EKG reviewed, normal sinus rhythm with no STEMI criteria. Chest x-ray clear.   Other labs show no pressure reading    3. Palpitations            PASTMEDICAL HISTORY     Past Medical History:   Diagnosis Date    Dyslipidemia 8/15/2019    Hypertension     Osteopenia 2020    Osteopenia 2020     SURGICAL HISTORY       Past Surgical History:   Procedure Laterality Date    APPENDECTOMY      CHOLECYSTECTOMY      HYSTERECTOMY, TOTAL ABDOMINAL      TONSILLECTOMY       CURRENT MEDICATIONS       Previous Medications    ATORVASTATIN (LIPITOR) 40 MG TABLET    Take 1 tablet by mouth daily    BABY ASPIRIN PO    Take by mouth Indications: take 3 times a week ( she stopped and I have asked she resume as it was recommended by Cardio)     CALCIUM CARBONATE-VIT D-MIN (CALCIUM 1200) 1458-8096 MG-UNIT CHEW    Take by mouth    CLOPIDOGREL (PLAVIX) 75 MG TABLET    TAKE 1 TABLET BY MOUTH  DAILY    CYANOCOBALAMIN (B-12) 1000 MCG SUBL    Place 1 tablet under the tongue daily    DENOSUMAB (PROLIA) 60 MG/ML SOSY SC INJECTION    Inject 1 mL into the skin once for 1 dose    GABAPENTIN (NEURONTIN) 100 MG CAPSULE    TAKE 1 CAPSULE BY MOUTH  DAILY    LISINOPRIL (PRINIVIL;ZESTRIL) 10 MG TABLET    Take 1 tablet by mouth daily    METOPROLOL SUCCINATE (TOPROL XL) 100 MG EXTENDED RELEASE TABLET    Take 1 tablet by mouth daily    PANTOPRAZOLE (PROTONIX) 20 MG TABLET    TAKE 1 TABLET BY MOUTH  DAILY    VENLAFAXINE (EFFEXOR XR) 37.5 MG EXTENDED RELEASE CAPSULE    Take 1 capsule by mouth daily     ALLERGIES     is allergic to clarithromycin, levaquin  [levofloxacin in d5w], levofloxacin, and sertraline. FAMILY HISTORY     She indicated that her mother is . She indicated that her father is .      SOCIAL HISTORY       Social History     Tobacco Use    Smoking status: Former Smoker     Packs/day: 1.00     Years: 15.00     Pack years: 15.00     Quit date: 8/15/1989     Years since quittin.1    Smokeless tobacco: Never Used   Substance Use Topics    Alcohol use: Yes     Comment: every now and then     Drug use: Never I personally evaluated and examined the patient in conjunction with the APC and agree with the assessment, treatment plan, and disposition of the patient as recorded by the APC. Matilde Yusuf DO  The care is provided during an unprecedented national emergency due to the novel coronavirus, COVID 19.   Attending Emergency Physician          Hugo Joy DO  10/10/21 7930

## 2021-10-10 NOTE — ED NOTES
Patient presents to the er with heart palpitations and elevated blood pressure verbalizing has been feeling dizzy along with headaches. Patient monitors blood pressure at home takes lisinopril and metoprolol.      Cade Dwyer RN  10/10/21 1543

## 2021-10-11 LAB
EKG ATRIAL RATE: 61 BPM
EKG P AXIS: 43 DEGREES
EKG P-R INTERVAL: 118 MS
EKG Q-T INTERVAL: 404 MS
EKG QRS DURATION: 94 MS
EKG QTC CALCULATION (BAZETT): 406 MS
EKG R AXIS: -4 DEGREES
EKG T AXIS: 62 DEGREES
EKG VENTRICULAR RATE: 61 BPM

## 2021-10-11 PROCEDURE — 93010 ELECTROCARDIOGRAM REPORT: CPT | Performed by: INTERNAL MEDICINE

## 2021-10-18 ENCOUNTER — TELEPHONE (OUTPATIENT)
Dept: FAMILY MEDICINE CLINIC | Age: 81
End: 2021-10-18

## 2021-10-18 ENCOUNTER — OFFICE VISIT (OUTPATIENT)
Dept: FAMILY MEDICINE CLINIC | Age: 81
End: 2021-10-18
Payer: MEDICARE

## 2021-10-18 VITALS
BODY MASS INDEX: 19.91 KG/M2 | SYSTOLIC BLOOD PRESSURE: 158 MMHG | DIASTOLIC BLOOD PRESSURE: 71 MMHG | HEART RATE: 57 BPM | WEIGHT: 116.6 LBS | HEIGHT: 64 IN | OXYGEN SATURATION: 100 % | TEMPERATURE: 97.1 F

## 2021-10-18 DIAGNOSIS — G47.09 OTHER INSOMNIA: ICD-10-CM

## 2021-10-18 DIAGNOSIS — Z23 NEED FOR INFLUENZA VACCINATION: Primary | ICD-10-CM

## 2021-10-18 DIAGNOSIS — I10 ESSENTIAL HYPERTENSION: ICD-10-CM

## 2021-10-18 DIAGNOSIS — R42 VERTIGO: ICD-10-CM

## 2021-10-18 DIAGNOSIS — K21.9 GASTROESOPHAGEAL REFLUX DISEASE, UNSPECIFIED WHETHER ESOPHAGITIS PRESENT: ICD-10-CM

## 2021-10-18 DIAGNOSIS — N18.30 STAGE 3 CHRONIC KIDNEY DISEASE, UNSPECIFIED WHETHER STAGE 3A OR 3B CKD (HCC): ICD-10-CM

## 2021-10-18 DIAGNOSIS — G62.9 NEUROPATHY: ICD-10-CM

## 2021-10-18 DIAGNOSIS — D64.9 ANEMIA, UNSPECIFIED TYPE: ICD-10-CM

## 2021-10-18 DIAGNOSIS — K22.719 BARRETT'S ESOPHAGUS WITH DYSPLASIA: ICD-10-CM

## 2021-10-18 DIAGNOSIS — R42 DIZZINESS: ICD-10-CM

## 2021-10-18 DIAGNOSIS — E53.8 B12 DEFICIENCY: ICD-10-CM

## 2021-10-18 DIAGNOSIS — F41.1 GAD (GENERALIZED ANXIETY DISORDER): ICD-10-CM

## 2021-10-18 DIAGNOSIS — Z86.73 HISTORY OF ARTERIAL ISCHEMIC STROKE: ICD-10-CM

## 2021-10-18 PROBLEM — K31.A0 INTESTINAL METAPLASIA OF GASTRIC MUCOSA: Status: RESOLVED | Noted: 2020-06-09 | Resolved: 2021-10-18

## 2021-10-18 PROBLEM — Z87.11 HISTORY OF BLEEDING PEPTIC ULCER: Status: RESOLVED | Noted: 2019-08-15 | Resolved: 2021-10-18

## 2021-10-18 PROCEDURE — 99214 OFFICE O/P EST MOD 30 MIN: CPT | Performed by: FAMILY MEDICINE

## 2021-10-18 PROCEDURE — 1123F ACP DISCUSS/DSCN MKR DOCD: CPT | Performed by: FAMILY MEDICINE

## 2021-10-18 PROCEDURE — G8399 PT W/DXA RESULTS DOCUMENT: HCPCS | Performed by: FAMILY MEDICINE

## 2021-10-18 PROCEDURE — G8420 CALC BMI NORM PARAMETERS: HCPCS | Performed by: FAMILY MEDICINE

## 2021-10-18 PROCEDURE — 90694 VACC AIIV4 NO PRSRV 0.5ML IM: CPT | Performed by: FAMILY MEDICINE

## 2021-10-18 PROCEDURE — 4040F PNEUMOC VAC/ADMIN/RCVD: CPT | Performed by: FAMILY MEDICINE

## 2021-10-18 PROCEDURE — G0008 ADMIN INFLUENZA VIRUS VAC: HCPCS | Performed by: FAMILY MEDICINE

## 2021-10-18 PROCEDURE — G8427 DOCREV CUR MEDS BY ELIG CLIN: HCPCS | Performed by: FAMILY MEDICINE

## 2021-10-18 PROCEDURE — 1036F TOBACCO NON-USER: CPT | Performed by: FAMILY MEDICINE

## 2021-10-18 PROCEDURE — G8484 FLU IMMUNIZE NO ADMIN: HCPCS | Performed by: FAMILY MEDICINE

## 2021-10-18 PROCEDURE — 1090F PRES/ABSN URINE INCON ASSESS: CPT | Performed by: FAMILY MEDICINE

## 2021-10-18 RX ORDER — PANTOPRAZOLE SODIUM 20 MG/1
20 TABLET, DELAYED RELEASE ORAL DAILY
Qty: 90 TABLET | Refills: 1 | Status: SHIPPED | OUTPATIENT
Start: 2021-10-18 | End: 2022-02-14

## 2021-10-18 RX ORDER — BISOPROLOL FUMARATE 5 MG/1
5 TABLET ORAL DAILY
Qty: 30 TABLET | Refills: 0 | Status: SHIPPED | OUTPATIENT
Start: 2021-10-18 | End: 2021-11-10 | Stop reason: ALTCHOICE

## 2021-10-18 RX ORDER — MECLIZINE HCL 12.5 MG/1
12.5 TABLET ORAL 3 TIMES DAILY PRN
Qty: 30 TABLET | Refills: 0 | Status: SHIPPED | OUTPATIENT
Start: 2021-10-18 | End: 2021-10-28

## 2021-10-18 NOTE — PROGRESS NOTES
Subjective:      Patient ID: Gab Espinoza is a [de-identified] y.o. female. Here for follow up of HTN,  GERD, Barettes esophagus as well as H/O CKD and Chronic anemia. She needs a flu shot and needs refills on all meds. Mood, sleep, appetite ok. Anxiety and mood not better on effexor. States so wants to come off- will advice to wean off. States does not feel   She is on prolia Q 6 months and advised to take Ca with Vit D QD. She has been having dizziness on and off now for - almost a year- was diag as vertigo and was given antivert   This does seem to help though it makes her drowsy. Saw GI and endoscopy done by dr Jovan Knight in aug 2021- advised to take PPI long term. No further need for endoscopy for follow up per dr Jovan Knight notes. Last appt with cardio and advised to take plavix long term. and neuro did not see yet. Adviced to do so due to H/O ischemic stroke in the past when she was in [place]. Objective:   Physical Exam  Constitutional:       General: She is not in acute distress. HENT:      Head: Normocephalic and atraumatic. Right Ear: External ear normal.      Left Ear: External ear normal.      Nose: Nose normal.      Mouth/Throat:      Mouth: Mucous membranes are moist.   Eyes:      Conjunctiva/sclera: Conjunctivae normal.      Pupils: Pupils are equal, round, and reactive to light. Neck:      Thyroid: No thyromegaly. Trachea: No tracheal deviation. Cardiovascular:      Rate and Rhythm: Normal rate and regular rhythm. Pulses: Normal pulses. Heart sounds: Normal heart sounds. No murmur heard. No friction rub. No gallop. Pulmonary:      Effort: Pulmonary effort is normal. No respiratory distress. Breath sounds: Normal breath sounds. No stridor. No wheezing or rales. Chest:      Chest wall: No tenderness. Abdominal:      General: Bowel sounds are normal. There is no distension. Palpations: Abdomen is soft. Tenderness: There is no abdominal tenderness.  There is no rebound. Musculoskeletal:         General: Normal range of motion. Cervical back: Normal range of motion. Lymphadenopathy:      Cervical: No cervical adenopathy. Skin:     General: Skin is warm. Coloration: Skin is not pale. Findings: No erythema or rash. Neurological:      General: No focal deficit present. Mental Status: She is alert and oriented to person, place, and time. Mental status is at baseline. Cranial Nerves: No cranial nerve deficit. Motor: No abnormal muscle tone. Deep Tendon Reflexes: Reflexes normal.   Psychiatric:         Mood and Affect: Mood normal.         Behavior: Behavior normal.         Thought Content: Thought content normal.         Judgment: Judgment normal.          Vitals:    10/18/21 1058   BP: (!) 158/71   Pulse: 57   Temp:    SpO2:          Assessment:      Diagnosis Orders   1. Need for influenza vaccination  INFLUENZA, QUADV, ADJUVANTED, 65 YRS =, IM, PF, PREFILL SYR, 0.5ML (FLUAD)   2. Essential hypertension     3. SARAH (generalized anxiety disorder)     4. Other insomnia     5. Gastroesophageal reflux disease, unspecified whether esophagitis present     6. Gaytan's esophagus with dysplasia     7. Anemia, unspecified type     8. Stage 3 chronic kidney disease, unspecified whether stage 3a or 3b CKD ([facility] [address]     9. B12 deficiency     10.  Neuropathy           Plan:     Orders Placed This Encounter   Procedures    INFLUENZA, QUADV, ADJUVANTED, 72 YRS =, IM, PF, PREFILL SYR, 0.5ML (FLUAD)       Outpatient Encounter Medications as of 10/18/2021   Medication Sig Dispense Refill    meclizine (ANTIVERT) 25 MG tablet Take 1 tablet by mouth 3 times daily as needed for Dizziness 20 tablet 0    gabapentin (NEURONTIN) 100 MG capsule TAKE 1 CAPSULE BY MOUTH  DAILY 90 capsule 0    clopidogrel (PLAVIX) 75 MG tablet TAKE 1 TABLET BY MOUTH  DAILY 90 tablet 3    metoprolol succinate (TOPROL XL) 100 MG extended release tablet Take 1 tablet by mouth daily 90 tablet 1    lisinopril (PRINIVIL;ZESTRIL) 10 MG tablet Take 1 tablet by mouth daily 90 tablet 1    venlafaxine (EFFEXOR XR) 37.5 MG extended release capsule Take 1 capsule by mouth daily 90 capsule 1    atorvastatin (LIPITOR) 40 MG tablet Take 1 tablet by mouth daily 90 tablet 1    pantoprazole (PROTONIX) 20 MG tablet TAKE 1 TABLET BY MOUTH  DAILY 90 tablet 3    Cyanocobalamin (B-12) 1000 MCG SUBL Place 1 tablet under the tongue daily 90 tablet 5    BABY ASPIRIN PO Take by mouth Indications: take 3 times a week ( she stopped and I have asked she resume as it was recommended by Cardio)       Calcium Carbonate-Vit D-Min (CALCIUM 1200) 9884-9135 MG-UNIT CHEW Take by mouth      denosumab (PROLIA) 60 MG/ML SOSY SC injection Inject 1 mL into the skin once for 1 dose 1 mL 5     No facility-administered encounter medications on file as of 10/18/2021.     15 minutes spent with patient counseling/educating on acute/chronic medical health problems, medications,  along with treatment options. Reviewed multiple labs/imaging/medications with patient during this time. Following Diet discussion/education was done on Dash Diet. In addition Exercise plan and depression screen were discussed. Recent labs/imaging were discussed and reviewed with patient.

## 2021-10-18 NOTE — TELEPHONE ENCOUNTER
Pt called back and stated that the GI doctor stated that she is to stay on the same dose of pantoprazole and that she dont need a follow up please advise

## 2021-10-18 NOTE — TELEPHONE ENCOUNTER
LM to make appt with GI Dr to determine correct dose of Protonix med. Dr Raina Nobles will call in one month at the old dose for now.

## 2021-10-18 NOTE — PATIENT INSTRUCTIONS
Patient Education        DASH Diet: Care Instructions  Your Care Instructions     The DASH diet is an eating plan that can help lower your blood pressure. DASH stands for Dietary Approaches to Stop Hypertension. Hypertension is high blood pressure. The DASH diet focuses on eating foods that are high in calcium, potassium, and magnesium. These nutrients can lower blood pressure. The foods that are highest in these nutrients are fruits, vegetables, low-fat dairy products, nuts, seeds, and legumes. But taking calcium, potassium, and magnesium supplements instead of eating foods that are high in those nutrients does not have the same effect. The DASH diet also includes whole grains, fish, and poultry. The DASH diet is one of several lifestyle changes your doctor may recommend to lower your high blood pressure. Your doctor may also want you to decrease the amount of sodium in your diet. Lowering sodium while following the DASH diet can lower blood pressure even further than just the DASH diet alone. Follow-up care is a key part of your treatment and safety. Be sure to make and go to all appointments, and call your doctor if you are having problems. It's also a good idea to know your test results and keep a list of the medicines you take. How can you care for yourself at home? Following the DASH diet  · Eat 4 to 5 servings of fruit each day. A serving is 1 medium-sized piece of fruit, ½ cup chopped or canned fruit, 1/4 cup dried fruit, or 4 ounces (½ cup) of fruit juice. Choose fruit more often than fruit juice. · Eat 4 to 5 servings of vegetables each day. A serving is 1 cup of lettuce or raw leafy vegetables, ½ cup of chopped or cooked vegetables, or 4 ounces (½ cup) of vegetable juice. Choose vegetables more often than vegetable juice. · Get 2 to 3 servings of low-fat and fat-free dairy each day. A serving is 8 ounces of milk, 1 cup of yogurt, or 1 ½ ounces of cheese. · Eat 6 to 8 servings of grains each day.  A serving is 1 slice of bread, 1 ounce of dry cereal, or ½ cup of cooked rice, pasta, or cooked cereal. Try to choose whole-grain products as much as possible. · Limit lean meat, poultry, and fish to 2 servings each day. A serving is 3 ounces, about the size of a deck of cards. · Eat 4 to 5 servings of nuts, seeds, and legumes (cooked dried beans, lentils, and split peas) each week. A serving is 1/3 cup of nuts, 2 tablespoons of seeds, or ½ cup of cooked beans or peas. · Limit fats and oils to 2 to 3 servings each day. A serving is 1 teaspoon of vegetable oil or 2 tablespoons of salad dressing. · Limit sweets and added sugars to 5 servings or less a week. A serving is 1 tablespoon jelly or jam, ½ cup sorbet, or 1 cup of lemonade. · Eat less than 2,300 milligrams (mg) of sodium a day. If you limit your sodium to 1,500 mg a day, you can lower your blood pressure even more. · Be aware that all of these are the suggested number of servings for people who eat 1,800 to 2,000 calories a day. Your recommended number of servings may be different if you need more or fewer calories. Tips for success  · Start small. Do not try to make dramatic changes to your diet all at once. You might feel that you are missing out on your favorite foods and then be more likely to not follow the plan. Make small changes, and stick with them. Once those changes become habit, add a few more changes. · Try some of the following:  ? Make it a goal to eat a fruit or vegetable at every meal and at snacks. This will make it easy to get the recommended amount of fruits and vegetables each day. ? Try yogurt topped with fruit and nuts for a snack or healthy dessert. ? Add lettuce, tomato, cucumber, and onion to sandwiches. ? Combine a ready-made pizza crust with low-fat mozzarella cheese and lots of vegetable toppings. Try using tomatoes, squash, spinach, broccoli, carrots, cauliflower, and onions. ?  Have a variety of cut-up vegetables with a low-fat dip as an appetizer instead of chips and dip. ? Sprinkle sunflower seeds or chopped almonds over salads. Or try adding chopped walnuts or almonds to cooked vegetables. ? Try some vegetarian meals using beans and peas. Add garbanzo or kidney beans to salads. Make burritos and tacos with mashed lal beans or black beans. Where can you learn more? Go to https://BiOM.Levlr. org and sign in to your [employer] account. Enter E379 in the [employer] box to learn more about \"DASH Diet: Care Instructions. \"     If you do not have an account, please click on the \"Sign Up Now\" link. Current as of: April 29, 2021               Content Version: 13.0  © 3548-9336 Healthwise, Incorporated. Care instructions adapted under license by [facility] ([facility]). If you have questions about a medical condition or this instruction, always ask your healthcare professional. [address] any warranty or liability for your use of this information.

## 2021-10-18 NOTE — PROGRESS NOTES
Vaccine Information Sheet, \"Influenza - Inactivated\"  given to Carlton Anderson, or parent/legal guardian of  Carlton Anderson and verbalized understanding. Patient responses:    Have you ever had a reaction to a flu vaccine? No  Are you able to eat eggs without adverse effects? No  Do you have any current illness? No  Have you ever had Guillian Nelson Syndrome? No    Flu vaccine given per order. Please see immunization tab.

## 2021-11-03 ENCOUNTER — TELEPHONE (OUTPATIENT)
Dept: FAMILY MEDICINE CLINIC | Age: 81
End: 2021-11-03

## 2021-11-03 NOTE — TELEPHONE ENCOUNTER
----- Message from April Clay sent at 11/3/2021  9:16 AM EDT -----  Subject: Refill Request    QUESTIONS  Name of Medication? meclizine (ANTIVERT) 12.5 MG tablet  Patient-reported dosage and instructions? Pt takes 1 per day and 2 @ night  How many days do you have left? 6  Preferred Pharmacy? [facility] phone number (if available)? [phone]  ---------------------------------------------------------------------------  --------------  CALL BACK INFO  What is the best way for the office to contact you? OK to leave message on   voicemail  Preferred Call Back Phone Number?  [phone]

## 2021-11-10 ENCOUNTER — OFFICE VISIT (OUTPATIENT)
Dept: FAMILY MEDICINE CLINIC | Age: 81
End: 2021-11-10
Payer: MEDICARE

## 2021-11-10 ENCOUNTER — TELEPHONE (OUTPATIENT)
Dept: FAMILY MEDICINE CLINIC | Age: 81
End: 2021-11-10

## 2021-11-10 VITALS
HEART RATE: 54 BPM | WEIGHT: 120.4 LBS | OXYGEN SATURATION: 97 % | HEIGHT: 64 IN | BODY MASS INDEX: 20.55 KG/M2 | DIASTOLIC BLOOD PRESSURE: 89 MMHG | TEMPERATURE: 97 F | SYSTOLIC BLOOD PRESSURE: 139 MMHG

## 2021-11-10 DIAGNOSIS — K22.719 BARRETT'S ESOPHAGUS WITH DYSPLASIA: Primary | ICD-10-CM

## 2021-11-10 DIAGNOSIS — K21.9 GASTROESOPHAGEAL REFLUX DISEASE, UNSPECIFIED WHETHER ESOPHAGITIS PRESENT: ICD-10-CM

## 2021-11-10 DIAGNOSIS — E53.8 B12 DEFICIENCY: ICD-10-CM

## 2021-11-10 DIAGNOSIS — M81.0 AGE-RELATED OSTEOPOROSIS WITHOUT CURRENT PATHOLOGICAL FRACTURE: ICD-10-CM

## 2021-11-10 DIAGNOSIS — E55.9 VITAMIN D DEFICIENCY: ICD-10-CM

## 2021-11-10 DIAGNOSIS — I10 ESSENTIAL HYPERTENSION: ICD-10-CM

## 2021-11-10 DIAGNOSIS — R42 VERTIGO: ICD-10-CM

## 2021-11-10 DIAGNOSIS — E78.5 DYSLIPIDEMIA: ICD-10-CM

## 2021-11-10 DIAGNOSIS — G56.92 NEUROPATHY OF LEFT UPPER EXTREMITY: ICD-10-CM

## 2021-11-10 DIAGNOSIS — G62.9 NEUROPATHY: ICD-10-CM

## 2021-11-10 PROCEDURE — 3288F FALL RISK ASSESSMENT DOCD: CPT | Performed by: FAMILY MEDICINE

## 2021-11-10 PROCEDURE — G8399 PT W/DXA RESULTS DOCUMENT: HCPCS | Performed by: FAMILY MEDICINE

## 2021-11-10 PROCEDURE — 1036F TOBACCO NON-USER: CPT | Performed by: FAMILY MEDICINE

## 2021-11-10 PROCEDURE — 1123F ACP DISCUSS/DSCN MKR DOCD: CPT | Performed by: FAMILY MEDICINE

## 2021-11-10 PROCEDURE — G8420 CALC BMI NORM PARAMETERS: HCPCS | Performed by: FAMILY MEDICINE

## 2021-11-10 PROCEDURE — 1090F PRES/ABSN URINE INCON ASSESS: CPT | Performed by: FAMILY MEDICINE

## 2021-11-10 PROCEDURE — 99214 OFFICE O/P EST MOD 30 MIN: CPT | Performed by: FAMILY MEDICINE

## 2021-11-10 PROCEDURE — 4040F PNEUMOC VAC/ADMIN/RCVD: CPT | Performed by: FAMILY MEDICINE

## 2021-11-10 PROCEDURE — G8427 DOCREV CUR MEDS BY ELIG CLIN: HCPCS | Performed by: FAMILY MEDICINE

## 2021-11-10 PROCEDURE — G8484 FLU IMMUNIZE NO ADMIN: HCPCS | Performed by: FAMILY MEDICINE

## 2021-11-10 RX ORDER — ATORVASTATIN CALCIUM 40 MG/1
40 TABLET, FILM COATED ORAL DAILY
Qty: 90 TABLET | Refills: 2 | Status: SHIPPED | OUTPATIENT
Start: 2021-11-10 | End: 2022-04-12 | Stop reason: SDUPTHER

## 2021-11-10 RX ORDER — MECLIZINE HCL 12.5 MG/1
12.5 TABLET ORAL NIGHTLY PRN
Qty: 90 TABLET | Refills: 0 | Status: SHIPPED | OUTPATIENT
Start: 2021-11-10 | End: 2021-11-20

## 2021-11-10 RX ORDER — MECLIZINE HCL 12.5 MG/1
TABLET ORAL
COMMUNITY
Start: 2021-11-03 | End: 2021-11-10 | Stop reason: DRUGHIGH

## 2021-11-10 RX ORDER — METOPROLOL SUCCINATE 200 MG/1
200 TABLET, EXTENDED RELEASE ORAL DAILY
Qty: 90 TABLET | Refills: 3 | Status: SHIPPED | OUTPATIENT
Start: 2021-11-10 | End: 2022-04-12 | Stop reason: SDUPTHER

## 2021-11-10 RX ORDER — LISINOPRIL 10 MG/1
10 TABLET ORAL DAILY
Qty: 90 TABLET | Refills: 2 | Status: SHIPPED | OUTPATIENT
Start: 2021-11-10 | End: 2022-06-23 | Stop reason: SDUPTHER

## 2021-11-10 RX ORDER — GABAPENTIN 100 MG/1
CAPSULE ORAL
Qty: 90 CAPSULE | Refills: 0 | Status: SHIPPED | OUTPATIENT
Start: 2021-11-10 | End: 2022-01-31

## 2021-11-10 ASSESSMENT — PATIENT HEALTH QUESTIONNAIRE - PHQ9
1. LITTLE INTEREST OR PLEASURE IN DOING THINGS: 0
SUM OF ALL RESPONSES TO PHQ QUESTIONS 1-9: 0
SUM OF ALL RESPONSES TO PHQ QUESTIONS 1-9: 0
2. FEELING DOWN, DEPRESSED OR HOPELESS: 0
SUM OF ALL RESPONSES TO PHQ9 QUESTIONS 1 & 2: 0
SUM OF ALL RESPONSES TO PHQ QUESTIONS 1-9: 0

## 2021-11-10 ASSESSMENT — ENCOUNTER SYMPTOMS
VOMITING: 0
EYE REDNESS: 0
CONSTIPATION: 0
NAUSEA: 0
CHEST TIGHTNESS: 0
EYE PAIN: 0
VOICE CHANGE: 0
ABDOMINAL PAIN: 0
ABDOMINAL DISTENTION: 0
COUGH: 0
SHORTNESS OF BREATH: 0
ANAL BLEEDING: 0
BACK PAIN: 0
TROUBLE SWALLOWING: 0
EYE DISCHARGE: 0
DIARRHEA: 0
SINUS PRESSURE: 0
COLOR CHANGE: 0
BLOOD IN STOOL: 0
RECTAL PAIN: 0

## 2021-11-10 NOTE — TELEPHONE ENCOUNTER
Dr [facility] does need a f/u appt and they will refill her protonics when the makes an appt.  Pt was notified

## 2021-11-10 NOTE — PROGRESS NOTES
Subjective:      Patient ID: Anthony Arizmendi is a [de-identified] y.o. female. States she has vertigo almost every night and takes antivert nightly that helps with this as well as helps her sleep. She also takes 100 mg of neurontin for anxiety and sleep at night- will ok this for 3 months but after she will need to choose which she wants to be on neurontin vs antivert. Stopped effexor due to side effects and feels a lot better now. States GERD sx are under control for the most part except when she eats diary. Mood, sleep, appetite ok. Gained a bit of weight. Has been getting prolia shots Q 6 months- taking ca and Vit D daily as supplements. BP has improved- home log is WNL. States BP is up every times she comes here. Overall feels great- states back to exercising regularly and eating well- was not feeling up to it a few months ago but now feels good. Plans on leaving for [place] for the winter. [facility] recently saw cardiology and was advised to cont plavix. Will follow up in a year. [facility] saw Dr Monica Tijerina and was advised to stay on protonix life long and need refill. We called Dr Monica Tijerina office as there has been a misunderstanding about whether she needs to see them S/P EGD or not - jacques speak to them before further recommendation. Addendum - they did want her to follow up- she has been advised- they will refill rx for PPI after her follow up with them. Review of Systems   Constitutional: Negative for activity change, appetite change and fatigue. HENT: Negative for dental problem, ear pain, hearing loss, postnasal drip, sinus pressure, sneezing, tinnitus, trouble swallowing and voice change. Eyes: Negative for pain, discharge, redness and visual disturbance. Respiratory: Negative for cough, chest tightness and shortness of breath. Cardiovascular: Negative for chest pain, palpitations and leg swelling.    Gastrointestinal: Negative for abdominal distention, abdominal pain, anal bleeding, blood in stool, tenderness. Abdominal:      General: Bowel sounds are normal. There is no distension. Palpations: Abdomen is soft. Tenderness: There is no abdominal tenderness. There is no rebound. Musculoskeletal:         General: Normal range of motion. Cervical back: Normal range of motion. Lymphadenopathy:      Cervical: No cervical adenopathy. Skin:     General: Skin is warm. Coloration: Skin is not pale. Findings: No erythema or rash. Neurological:      General: No focal deficit present. Mental Status: She is alert and oriented to person, place, and time. Mental status is at baseline. Cranial Nerves: No cranial nerve deficit. Motor: No abnormal muscle tone. Deep Tendon Reflexes: Reflexes normal.   Psychiatric:         Mood and Affect: Mood normal.         Behavior: Behavior normal.         Thought Content: Thought content normal.         Judgment: Judgment normal.          Vitals:    11/10/21 1006   BP: (!) 145/74   Pulse: 54   Temp:    SpO2:          Assessment:      Diagnosis Orders   1. Gaytan's esophagus with dysplasia     2. Gastroesophageal reflux disease, unspecified whether esophagitis present     3. Essential hypertension     4. Vitamin D deficiency     5. B12 deficiency     6. Neuropathy     7. Dyslipidemia     8. Age-related osteoporosis without current pathological fracture           Plan:     No orders of the defined types were placed in this encounter.       Outpatient Encounter Medications as of 11/10/2021   Medication Sig Dispense Refill    meclizine (ANTIVERT) 12.5 MG tablet Take 1 tablet by mouth nightly as needed for Dizziness 90 tablet 0    metoprolol succinate (TOPROL XL) 200 MG extended release tablet Take 1 tablet by mouth daily 90 tablet 3    lisinopril (PRINIVIL;ZESTRIL) 10 MG tablet Take 1 tablet by mouth daily 90 tablet 2    pantoprazole (PROTONIX) 20 MG tablet Take 1 tablet by mouth daily 90 tablet 1    clopidogrel (PLAVIX) 75 MG tablet TAKE 1 TABLET BY MOUTH  DAILY 90 tablet 3    atorvastatin (LIPITOR) 40 MG tablet Take 1 tablet by mouth daily 90 tablet 1    Cyanocobalamin (B-12) 1000 MCG SUBL Place 1 tablet under the tongue daily 90 tablet 5    BABY ASPIRIN PO Take by mouth Indications: take 3 times a week ( she stopped and I have asked she resume as it was recommended by Cardio)       Calcium Carbonate-Vit D-Min (CALCIUM 1200) 9494-1664 MG-UNIT CHEW Take by mouth      [DISCONTINUED] meclizine (ANTIVERT) 12.5 MG tablet       [DISCONTINUED] bisoprolol (ZEBETA) 5 MG tablet Take 1 tablet by mouth daily 30 tablet 0    gabapentin (NEURONTIN) 100 MG capsule TAKE 1 CAPSULE BY MOUTH  DAILY 90 capsule 0    denosumab (PROLIA) 60 MG/ML SOSY SC injection Inject 1 mL into the skin once for 1 dose 1 mL 5    [DISCONTINUED] metoprolol succinate (TOPROL XL) 100 MG extended release tablet Take 1 tablet by mouth daily 90 tablet 1    [DISCONTINUED] lisinopril (PRINIVIL;ZESTRIL) 10 MG tablet Take 1 tablet by mouth daily 90 tablet 1     No facility-administered encounter medications on file as of 11/10/2021.     15 minutes spent with patient counseling/educating on acute/chronic medical health problems, medications,  along with treatment options. Reviewed multiple labs/imaging/medications with patient during this time. Following Diet discussion/education was done on Dash Diet. In addition Exercise plan and depression screen were discussed. Recent labs/imaging were discussed and reviewed with patient.

## 2021-11-10 NOTE — PATIENT INSTRUCTIONS
Patient Education        DASH Diet: Care Instructions  Your Care Instructions     The DASH diet is an eating plan that can help lower your blood pressure. DASH stands for Dietary Approaches to Stop Hypertension. Hypertension is high blood pressure. The DASH diet focuses on eating foods that are high in calcium, potassium, and magnesium. These nutrients can lower blood pressure. The foods that are highest in these nutrients are fruits, vegetables, low-fat dairy products, nuts, seeds, and legumes. But taking calcium, potassium, and magnesium supplements instead of eating foods that are high in those nutrients does not have the same effect. The DASH diet also includes whole grains, fish, and poultry. The DASH diet is one of several lifestyle changes your doctor may recommend to lower your high blood pressure. Your doctor may also want you to decrease the amount of sodium in your diet. Lowering sodium while following the DASH diet can lower blood pressure even further than just the DASH diet alone. Follow-up care is a key part of your treatment and safety. Be sure to make and go to all appointments, and call your doctor if you are having problems. It's also a good idea to know your test results and keep a list of the medicines you take. How can you care for yourself at home? Following the DASH diet  · Eat 4 to 5 servings of fruit each day. A serving is 1 medium-sized piece of fruit, ½ cup chopped or canned fruit, 1/4 cup dried fruit, or 4 ounces (½ cup) of fruit juice. Choose fruit more often than fruit juice. · Eat 4 to 5 servings of vegetables each day. A serving is 1 cup of lettuce or raw leafy vegetables, ½ cup of chopped or cooked vegetables, or 4 ounces (½ cup) of vegetable juice. Choose vegetables more often than vegetable juice. · Get 2 to 3 servings of low-fat and fat-free dairy each day. A serving is 8 ounces of milk, 1 cup of yogurt, or 1 ½ ounces of cheese. · Eat 6 to 8 servings of grains each day.  A serving is 1 slice of bread, 1 ounce of dry cereal, or ½ cup of cooked rice, pasta, or cooked cereal. Try to choose whole-grain products as much as possible. · Limit lean meat, poultry, and fish to 2 servings each day. A serving is 3 ounces, about the size of a deck of cards. · Eat 4 to 5 servings of nuts, seeds, and legumes (cooked dried beans, lentils, and split peas) each week. A serving is 1/3 cup of nuts, 2 tablespoons of seeds, or ½ cup of cooked beans or peas. · Limit fats and oils to 2 to 3 servings each day. A serving is 1 teaspoon of vegetable oil or 2 tablespoons of salad dressing. · Limit sweets and added sugars to 5 servings or less a week. A serving is 1 tablespoon jelly or jam, ½ cup sorbet, or 1 cup of lemonade. · Eat less than 2,300 milligrams (mg) of sodium a day. If you limit your sodium to 1,500 mg a day, you can lower your blood pressure even more. · Be aware that all of these are the suggested number of servings for people who eat 1,800 to 2,000 calories a day. Your recommended number of servings may be different if you need more or fewer calories. Tips for success  · Start small. Do not try to make dramatic changes to your diet all at once. You might feel that you are missing out on your favorite foods and then be more likely to not follow the plan. Make small changes, and stick with them. Once those changes become habit, add a few more changes. · Try some of the following:  ? Make it a goal to eat a fruit or vegetable at every meal and at snacks. This will make it easy to get the recommended amount of fruits and vegetables each day. ? Try yogurt topped with fruit and nuts for a snack or healthy dessert. ? Add lettuce, tomato, cucumber, and onion to sandwiches. ? Combine a ready-made pizza crust with low-fat mozzarella cheese and lots of vegetable toppings. Try using tomatoes, squash, spinach, broccoli, carrots, cauliflower, and onions. ?  Have a variety of cut-up vegetables with a low-fat dip as an appetizer instead of chips and dip. ? Sprinkle sunflower seeds or chopped almonds over salads. Or try adding chopped walnuts or almonds to cooked vegetables. ? Try some vegetarian meals using beans and peas. Add garbanzo or kidney beans to salads. Make burritos and tacos with mashed lal beans or black beans. Where can you learn more? Go to https://fintonic.AskYou. org and sign in to your Immediately account. Enter S339 in the [employer] box to learn more about \"DASH Diet: Care Instructions. \"     If you do not have an account, please click on the \"Sign Up Now\" link. Current as of: April 29, 2021               Content Version: 13.0  © 1061-7159 Healthwise, Incorporated. Care instructions adapted under license by [facility] [place] ([facility]). If you have questions about a medical condition or this instruction, always ask your healthcare professional. [address]  any warranty or liability for your use of this information.

## 2022-02-13 DIAGNOSIS — K22.719 BARRETT'S ESOPHAGUS WITH DYSPLASIA: ICD-10-CM

## 2022-02-13 DIAGNOSIS — K21.9 GASTROESOPHAGEAL REFLUX DISEASE, UNSPECIFIED WHETHER ESOPHAGITIS PRESENT: ICD-10-CM

## 2022-02-14 RX ORDER — PANTOPRAZOLE SODIUM 20 MG/1
20 TABLET, DELAYED RELEASE ORAL DAILY
Qty: 90 TABLET | Refills: 3 | Status: SHIPPED | OUTPATIENT
Start: 2022-02-14

## 2022-03-12 DIAGNOSIS — G56.92 NEUROPATHY OF LEFT UPPER EXTREMITY: ICD-10-CM

## 2022-03-13 RX ORDER — GABAPENTIN 100 MG/1
CAPSULE ORAL
Qty: 30 CAPSULE | Refills: 0 | Status: SHIPPED | OUTPATIENT
Start: 2022-03-13 | End: 2022-04-22 | Stop reason: SDUPTHER

## 2022-04-11 DIAGNOSIS — G56.92 NEUROPATHY OF LEFT UPPER EXTREMITY: ICD-10-CM

## 2022-04-12 ENCOUNTER — OFFICE VISIT (OUTPATIENT)
Dept: FAMILY MEDICINE CLINIC | Age: 82
End: 2022-04-12
Payer: MEDICARE

## 2022-04-12 VITALS
BODY MASS INDEX: 21.54 KG/M2 | OXYGEN SATURATION: 97 % | SYSTOLIC BLOOD PRESSURE: 120 MMHG | HEART RATE: 58 BPM | DIASTOLIC BLOOD PRESSURE: 70 MMHG | WEIGHT: 126.2 LBS | TEMPERATURE: 96.6 F | HEIGHT: 64 IN

## 2022-04-12 DIAGNOSIS — Z00.00 MEDICARE ANNUAL WELLNESS VISIT, SUBSEQUENT: Primary | ICD-10-CM

## 2022-04-12 DIAGNOSIS — I10 ESSENTIAL HYPERTENSION: ICD-10-CM

## 2022-04-12 DIAGNOSIS — N18.30 STAGE 3 CHRONIC KIDNEY DISEASE, UNSPECIFIED WHETHER STAGE 3A OR 3B CKD (HCC): ICD-10-CM

## 2022-04-12 DIAGNOSIS — E78.5 DYSLIPIDEMIA: ICD-10-CM

## 2022-04-12 PROCEDURE — 1123F ACP DISCUSS/DSCN MKR DOCD: CPT | Performed by: FAMILY MEDICINE

## 2022-04-12 PROCEDURE — 4040F PNEUMOC VAC/ADMIN/RCVD: CPT | Performed by: FAMILY MEDICINE

## 2022-04-12 PROCEDURE — G0439 PPPS, SUBSEQ VISIT: HCPCS | Performed by: FAMILY MEDICINE

## 2022-04-12 RX ORDER — METOPROLOL SUCCINATE 200 MG/1
200 TABLET, EXTENDED RELEASE ORAL DAILY
Qty: 30 TABLET | Refills: 3 | Status: SHIPPED | OUTPATIENT
Start: 2022-04-12 | End: 2022-08-17

## 2022-04-12 RX ORDER — ATORVASTATIN CALCIUM 40 MG/1
40 TABLET, FILM COATED ORAL DAILY
Qty: 30 TABLET | Refills: 3 | Status: SHIPPED | OUTPATIENT
Start: 2022-04-12 | End: 2022-06-07 | Stop reason: SDUPTHER

## 2022-04-12 RX ORDER — GABAPENTIN 100 MG/1
CAPSULE ORAL
Qty: 30 CAPSULE | Refills: 11 | OUTPATIENT
Start: 2022-04-12

## 2022-04-12 SDOH — ECONOMIC STABILITY: FOOD INSECURITY: WITHIN THE PAST 12 MONTHS, THE FOOD YOU BOUGHT JUST DIDN'T LAST AND YOU DIDN'T HAVE MONEY TO GET MORE.: NEVER TRUE

## 2022-04-12 SDOH — ECONOMIC STABILITY: FOOD INSECURITY: WITHIN THE PAST 12 MONTHS, YOU WORRIED THAT YOUR FOOD WOULD RUN OUT BEFORE YOU GOT MONEY TO BUY MORE.: NEVER TRUE

## 2022-04-12 ASSESSMENT — PATIENT HEALTH QUESTIONNAIRE - PHQ9
SUM OF ALL RESPONSES TO PHQ QUESTIONS 1-9: 0
2. FEELING DOWN, DEPRESSED OR HOPELESS: 0
SUM OF ALL RESPONSES TO PHQ9 QUESTIONS 1 & 2: 0
SUM OF ALL RESPONSES TO PHQ QUESTIONS 1-9: 0
SUM OF ALL RESPONSES TO PHQ QUESTIONS 1-9: 0
SUM OF ALL RESPONSES TO PHQ9 QUESTIONS 1 & 2: 0
SUM OF ALL RESPONSES TO PHQ QUESTIONS 1-9: 0
SUM OF ALL RESPONSES TO PHQ QUESTIONS 1-9: 0
1. LITTLE INTEREST OR PLEASURE IN DOING THINGS: 0
2. FEELING DOWN, DEPRESSED OR HOPELESS: 0
SUM OF ALL RESPONSES TO PHQ QUESTIONS 1-9: 0
1. LITTLE INTEREST OR PLEASURE IN DOING THINGS: 0

## 2022-04-12 ASSESSMENT — LIFESTYLE VARIABLES
HOW OFTEN DO YOU HAVE A DRINK CONTAINING ALCOHOL: MONTHLY OR LESS
HOW MANY STANDARD DRINKS CONTAINING ALCOHOL DO YOU HAVE ON A TYPICAL DAY: 1 OR 2

## 2022-04-12 ASSESSMENT — SOCIAL DETERMINANTS OF HEALTH (SDOH): HOW HARD IS IT FOR YOU TO PAY FOR THE VERY BASICS LIKE FOOD, HOUSING, MEDICAL CARE, AND HEATING?: NOT HARD AT ALL

## 2022-04-12 NOTE — PROGRESS NOTES
Medicare Annual Wellness Visit    Renita Saxena is here for Medicare AWV, Hypertension (3 month follow up ), and Arm Pain (med refill gabapetin and all meds )    Assessment & Plan   Medicare annual wellness visit, subsequent      Recommendations for Preventive Services Due: see orders and patient instructions/AVS.  Recommended screening schedule for the next 5-10 years is provided to the patient in written form: see Patient Instructions/AVS.     Return for Medicare Annual Wellness Visit in 1 year. Subjective   The following acute and/or chronic problems were also addressed today:  HTN, neuropathy, CKD, Barettes esophagus- states was told by GI to cont PPI long term and that they did not need to see her, when we contacted that office - they said that she does need to see them regularly. Also has seen Cardiology- for her Plavix. States she was told she will need seen yearly. Cannot remember name of her cardiologist.        Patient's complete Health Risk Assessment and screening values have been reviewed and are found in Flowsheets. The following problems were reviewed today and where indicated follow up appointments were made and/or referrals ordered.     Positive Risk Factor Screenings with Interventions:             General Health and ACP:  General  In general, how would you say your health is?: Good  In the past 7 days, have you experienced any of the following: New or Increased Pain, New or Increased Fatigue, Loneliness, Social Isolation, Stress or Anger?: No  Do you get the social and emotional support that you need?: Yes  Do you have a Living Will?: (!) No    Advance Directives     Power of  Living Will ACP-Advance Directive ACP-Power of     Not on File Not on File Not on File Not on File      General Health Risk Interventions:  · No Living Will: Advance Care Planning addressed with patient today    Health Habits/Nutrition:     Physical Activity: Insufficiently Active    Days of Exercise per Week: 2 days    Minutes of Exercise per Session: 10 min     Have you lost any weight without trying in the past 3 months?: No  Body mass index: 21.66  Have you seen the dentist within the past year?: (!) No    Health Habits/Nutrition Interventions:  · Inadequate physical activity:  patient is not ready to increase his/her physical activity level at this time    Hearing/Vision:  Do you or your family notice any trouble with your hearing that hasn't been managed with hearing aids?: No  Do you have difficulty driving, watching TV, or doing any of your daily activities because of your eyesight?: No  Have you had an eye exam within the past year?: (!) No  No exam data present    Hearing/Vision Interventions:  · Vision concerns:  patient encouraged to make appointment with his/her eye specialist            Objective   Vitals:    04/12/22 1030 04/12/22 1035   BP: (!) 183/80 (!) 161/82   Pulse: 60 58   Temp: 96.6 °F (35.9 °C)    SpO2: 97%    Weight: 126 lb 3.2 oz (57.2 kg)    Height: 5' 4\" (1.626 m)       Body mass index is 21.66 kg/m².         General Appearance: alert and oriented to person, place and time, well developed and well- nourished, in no acute distress  Skin: warm and dry, no rash or erythema  Head: normocephalic and atraumatic  Eyes: pupils equal, round, and reactive to light, extraocular eye movements intact, conjunctivae normal  ENT: tympanic membrane, external ear and ear canal normal bilaterally, nose without deformity, nasal mucosa and turbinates normal without polyps  Neck: supple and non-tender without mass, no thyromegaly or thyroid nodules, no cervical lymphadenopathy  Pulmonary/Chest: clear to auscultation bilaterally- no wheezes, rales or rhonchi, normal air movement, no respiratory distress  Cardiovascular: normal rate, regular rhythm, normal S1 and S2, no murmurs, rubs, clicks, or gallops, distal pulses intact, no carotid bruits  Abdomen: soft, non-tender, non-distended, normal bowel sounds, no masses or organomegaly  Extremities: no cyanosis, clubbing or edema  Musculoskeletal: normal range of motion, no joint swelling, deformity or tenderness  Neurologic: reflexes normal and symmetric, no cranial nerve deficit, gait, coordination and speech normal          Allergies   Allergen Reactions    Clarithromycin      Was on this for H Pylori- Palpitations, chest pain was admitted in Hospital    Effexor [Venlafaxine]      sweaty and feels hot flashes that she never had before this med    Levaquin  [Levofloxacin In D5w]     Levofloxacin      palpitations    Sertraline      Patient states never taken anything for anxiety or depression in the past and states does not remember being on this. Prior to Visit Medications    Medication Sig Taking?  Authorizing Provider   gabapentin (NEURONTIN) 100 MG capsule TAKE 1 CAPSULE BY MOUTH  DAILY Yes Roxy Maurer MD   pantoprazole (PROTONIX) 20 MG tablet TAKE 1 TABLET BY MOUTH  DAILY Yes Roxy Maurer MD   metoprolol succinate (TOPROL XL) 200 MG extended release tablet Take 1 tablet by mouth daily Yes Roxy Maurer MD   lisinopril (PRINIVIL;ZESTRIL) 10 MG tablet Take 1 tablet by mouth daily Yes Roxy Maurer MD   atorvastatin (LIPITOR) 40 MG tablet Take 1 tablet by mouth daily Yes Roxy Maurer MD   clopidogrel (PLAVIX) 75 MG tablet TAKE 1 TABLET BY MOUTH  DAILY Yes Roxy Maurer MD   Cyanocobalamin (B-12) 1000 MCG SUBL Place 1 tablet under the tongue daily Yes Roxy Maurer MD   BABY ASPIRIN PO Take by mouth Indications: take 3 times a week ( she stopped and I have asked she resume as it was recommended by Cardio)  Yes Historical Provider, MD   Calcium Carbonate-Vit D-Min (CALCIUM 1200) 4863-2637 MG-UNIT CHEW Take by mouth Yes Historical Provider, MD   denosumab (PROLIA) 60 MG/ML SOSY SC injection Inject 1 mL into the skin once for 1 dose  Roxy Maurer MD       [facility] (Including outside providers/suppliers regularly involved in providing care): Patient Care Team:  Connor Lehman MD as PCP - General (Family Medicine)  Connor Lehman MD as PCP - [facility] Empaneled Provider    Reviewed and updated this visit:  Tobacco  Allergies  Meds  Med Hx  Surg Hx  Soc Hx  Fam Hx

## 2022-04-12 NOTE — PATIENT INSTRUCTIONS
Personalized Preventive Plan for Freda Coley - 4/12/2022  Medicare offers a range of preventive health benefits. Some of the tests and screenings are paid in full while other may be subject to a deductible, co-insurance, and/or copay. Some of these benefits include a comprehensive review of your medical history including lifestyle, illnesses that may run in your family, and various assessments and screenings as appropriate. After reviewing your medical record and screening and assessments performed today your provider may have ordered immunizations, labs, imaging, and/or referrals for you. A list of these orders (if applicable) as well as your Preventive Care list are included within your After Visit Summary for your review. Other Preventive Recommendations:    · A preventive eye exam performed by an eye specialist is recommended every 1-2 years to screen for glaucoma; cataracts, macular degeneration, and other eye disorders. · A preventive dental visit is recommended every 6 months. · Try to get at least 150 minutes of exercise per week or 10,000 steps per day on a pedometer . · Order or download the FREE \"Exercise & Physical Activity: Your Everyday Guide\" from [employer]. Call 9-554.866.2285 or search [employer] online. · You need 5360-1995 mg of calcium and 1086-8134 IU of vitamin D per day. It is possible to meet your calcium requirement with diet alone, but a vitamin D supplement is usually necessary to meet this goal.  · When exposed to the sun, use a sunscreen that protects against both UVA and UVB radiation with an SPF of 30 or greater. Reapply every 2 to 3 hours or after sweating, drying off with a towel, or swimming. · Always wear a seat belt when traveling in a car. Always wear a helmet when riding a bicycle or motorcycle.

## 2022-04-21 ENCOUNTER — HOSPITAL ENCOUNTER (OUTPATIENT)
Age: 82
Discharge: HOME | End: 2022-04-21
Payer: MEDICARE

## 2022-04-21 DIAGNOSIS — N18.30 STAGE 3 CHRONIC KIDNEY DISEASE, UNSPECIFIED WHETHER STAGE 3A OR 3B CKD (HCC): ICD-10-CM

## 2022-04-21 LAB
ANION GAP SERPL CALCULATED.3IONS-SCNC: 18 MMOL/L (ref 9–17)
BUN BLDV-MCNC: 19 MG/DL (ref 8–23)
CALCIUM SERPL-MCNC: 9.9 MG/DL (ref 8.6–10.4)
CHLORIDE BLD-SCNC: 107 MMOL/L (ref 98–107)
CO2: 22 MMOL/L (ref 20–31)
CREAT SERPL-MCNC: 1.06 MG/DL (ref 0.5–0.9)
GFR AFRICAN AMERICAN: >60 ML/MIN
GFR NON-AFRICAN AMERICAN: 50 ML/MIN
GFR SERPL CREATININE-BSD FRML MDRD: ABNORMAL ML/MIN/{1.73_M2}
GLUCOSE BLD-MCNC: 98 MG/DL (ref 70–99)
POTASSIUM SERPL-SCNC: 3.9 MMOL/L (ref 3.7–5.3)
SODIUM BLD-SCNC: 147 MMOL/L (ref 135–144)

## 2022-04-21 PROCEDURE — 80048 BASIC METABOLIC PNL TOTAL CA: CPT

## 2022-04-21 PROCEDURE — 36415 COLL VENOUS BLD VENIPUNCTURE: CPT

## 2022-04-22 ENCOUNTER — TELEPHONE (OUTPATIENT)
Dept: FAMILY MEDICINE CLINIC | Age: 82
End: 2022-04-22

## 2022-04-22 DIAGNOSIS — G62.9 NEUROPATHY: Primary | ICD-10-CM

## 2022-04-22 RX ORDER — GABAPENTIN 100 MG/1
100 CAPSULE ORAL DAILY
Qty: 90 CAPSULE | Refills: 0 | Status: SHIPPED | OUTPATIENT
Start: 2022-04-22 | End: 2022-06-17

## 2022-04-22 NOTE — TELEPHONE ENCOUNTER
Call from pt asking if you can look at her labs to see if you can prescribe Gabapentin  Pharm [facility]

## 2022-04-22 NOTE — TELEPHONE ENCOUNTER
Kidneys are stable but she does have mild insufficiency that needs to be monitored at least 3 times a year.   I sent a 90 day refill to [facility]

## 2022-06-06 DIAGNOSIS — E78.5 DYSLIPIDEMIA: ICD-10-CM

## 2022-06-07 RX ORDER — ATORVASTATIN CALCIUM 40 MG/1
40 TABLET, FILM COATED ORAL DAILY
Qty: 90 TABLET | Refills: 3 | Status: SHIPPED | OUTPATIENT
Start: 2022-06-07

## 2022-06-16 DIAGNOSIS — G62.9 NEUROPATHY: ICD-10-CM

## 2022-06-17 RX ORDER — GABAPENTIN 100 MG/1
CAPSULE ORAL
Qty: 90 CAPSULE | Refills: 0 | Status: SHIPPED | OUTPATIENT
Start: 2022-06-17 | End: 2022-09-26 | Stop reason: SDUPTHER

## 2022-06-23 DIAGNOSIS — I10 ESSENTIAL HYPERTENSION: Primary | ICD-10-CM

## 2022-06-23 RX ORDER — LISINOPRIL 10 MG/1
10 TABLET ORAL DAILY
Qty: 90 TABLET | Refills: 1 | Status: SHIPPED | OUTPATIENT
Start: 2022-06-23 | End: 2022-09-26 | Stop reason: DRUGHIGH

## 2022-08-16 DIAGNOSIS — I10 ESSENTIAL HYPERTENSION: ICD-10-CM

## 2022-08-17 RX ORDER — METOPROLOL SUCCINATE 200 MG/1
200 TABLET, EXTENDED RELEASE ORAL DAILY
Qty: 90 TABLET | Refills: 3 | Status: SHIPPED | OUTPATIENT
Start: 2022-08-17

## 2022-08-25 ENCOUNTER — OFFICE VISIT (OUTPATIENT)
Dept: FAMILY MEDICINE CLINIC | Age: 82
End: 2022-08-25
Payer: MEDICARE

## 2022-08-25 VITALS
TEMPERATURE: 97.9 F | BODY MASS INDEX: 20.79 KG/M2 | OXYGEN SATURATION: 100 % | HEART RATE: 47 BPM | HEIGHT: 64 IN | SYSTOLIC BLOOD PRESSURE: 139 MMHG | WEIGHT: 121.8 LBS | DIASTOLIC BLOOD PRESSURE: 88 MMHG

## 2022-08-25 DIAGNOSIS — R00.1 SINUS BRADYCARDIA: ICD-10-CM

## 2022-08-25 DIAGNOSIS — K22.719 BARRETT'S ESOPHAGUS WITH DYSPLASIA: ICD-10-CM

## 2022-08-25 DIAGNOSIS — E78.5 DYSLIPIDEMIA: Primary | ICD-10-CM

## 2022-08-25 DIAGNOSIS — G47.09 OTHER INSOMNIA: ICD-10-CM

## 2022-08-25 DIAGNOSIS — M81.0 AGE-RELATED OSTEOPOROSIS WITHOUT CURRENT PATHOLOGICAL FRACTURE: ICD-10-CM

## 2022-08-25 DIAGNOSIS — I10 ESSENTIAL HYPERTENSION: ICD-10-CM

## 2022-08-25 DIAGNOSIS — G62.9 NEUROPATHY: ICD-10-CM

## 2022-08-25 DIAGNOSIS — N18.30 STAGE 3 CHRONIC KIDNEY DISEASE, UNSPECIFIED WHETHER STAGE 3A OR 3B CKD (HCC): ICD-10-CM

## 2022-08-25 DIAGNOSIS — F41.1 GAD (GENERALIZED ANXIETY DISORDER): ICD-10-CM

## 2022-08-25 DIAGNOSIS — E55.9 VITAMIN D DEFICIENCY: ICD-10-CM

## 2022-08-25 DIAGNOSIS — R53.82 CHRONIC FATIGUE: ICD-10-CM

## 2022-08-25 DIAGNOSIS — K21.9 GASTROESOPHAGEAL REFLUX DISEASE, UNSPECIFIED WHETHER ESOPHAGITIS PRESENT: ICD-10-CM

## 2022-08-25 DIAGNOSIS — E53.8 B12 DEFICIENCY: ICD-10-CM

## 2022-08-25 DIAGNOSIS — R73.9 HYPERGLYCEMIA: ICD-10-CM

## 2022-08-25 DIAGNOSIS — D64.9 ANEMIA, UNSPECIFIED TYPE: ICD-10-CM

## 2022-08-25 DIAGNOSIS — M81.0 SENILE OSTEOPOROSIS: ICD-10-CM

## 2022-08-25 PROBLEM — E28.319 EARLY MENOPAUSE: Status: RESOLVED | Noted: 2020-09-08 | Resolved: 2022-08-25

## 2022-08-25 PROBLEM — M99.79 NARROWING OF INTERVERTEBRAL DISC SPACE: Status: RESOLVED | Noted: 2020-12-16 | Resolved: 2022-08-25

## 2022-08-25 PROBLEM — M54.12 CERVICAL RADICULOPATHY: Status: RESOLVED | Noted: 2020-06-09 | Resolved: 2022-08-25

## 2022-08-25 PROCEDURE — G8399 PT W/DXA RESULTS DOCUMENT: HCPCS | Performed by: FAMILY MEDICINE

## 2022-08-25 PROCEDURE — 1090F PRES/ABSN URINE INCON ASSESS: CPT | Performed by: FAMILY MEDICINE

## 2022-08-25 PROCEDURE — G8427 DOCREV CUR MEDS BY ELIG CLIN: HCPCS | Performed by: FAMILY MEDICINE

## 2022-08-25 PROCEDURE — 1036F TOBACCO NON-USER: CPT | Performed by: FAMILY MEDICINE

## 2022-08-25 PROCEDURE — G8420 CALC BMI NORM PARAMETERS: HCPCS | Performed by: FAMILY MEDICINE

## 2022-08-25 PROCEDURE — 1123F ACP DISCUSS/DSCN MKR DOCD: CPT | Performed by: FAMILY MEDICINE

## 2022-08-25 PROCEDURE — 99214 OFFICE O/P EST MOD 30 MIN: CPT | Performed by: FAMILY MEDICINE

## 2022-08-25 PROCEDURE — 3288F FALL RISK ASSESSMENT DOCD: CPT | Performed by: FAMILY MEDICINE

## 2022-08-25 ASSESSMENT — PATIENT HEALTH QUESTIONNAIRE - PHQ9
SUM OF ALL RESPONSES TO PHQ QUESTIONS 1-9: 0
1. LITTLE INTEREST OR PLEASURE IN DOING THINGS: 0
SUM OF ALL RESPONSES TO PHQ9 QUESTIONS 1 & 2: 0
2. FEELING DOWN, DEPRESSED OR HOPELESS: 0
SUM OF ALL RESPONSES TO PHQ QUESTIONS 1-9: 0
2. FEELING DOWN, DEPRESSED OR HOPELESS: 0
SUM OF ALL RESPONSES TO PHQ QUESTIONS 1-9: 0
1. LITTLE INTEREST OR PLEASURE IN DOING THINGS: 0
SUM OF ALL RESPONSES TO PHQ9 QUESTIONS 1 & 2: 0

## 2022-08-25 ASSESSMENT — ANXIETY QUESTIONNAIRES
4. TROUBLE RELAXING: 0
GAD7 TOTAL SCORE: 0
1. FEELING NERVOUS, ANXIOUS, OR ON EDGE: 0
3. WORRYING TOO MUCH ABOUT DIFFERENT THINGS: 0
7. FEELING AFRAID AS IF SOMETHING AWFUL MIGHT HAPPEN: 0
IF YOU CHECKED OFF ANY PROBLEMS ON THIS QUESTIONNAIRE, HOW DIFFICULT HAVE THESE PROBLEMS MADE IT FOR YOU TO DO YOUR WORK, TAKE CARE OF THINGS AT HOME, OR GET ALONG WITH OTHER PEOPLE: NOT DIFFICULT AT ALL
5. BEING SO RESTLESS THAT IT IS HARD TO SIT STILL: 0
2. NOT BEING ABLE TO STOP OR CONTROL WORRYING: 0
6. BECOMING EASILY ANNOYED OR IRRITABLE: 0

## 2022-08-25 ASSESSMENT — ENCOUNTER SYMPTOMS
RECTAL PAIN: 0
TROUBLE SWALLOWING: 0
ABDOMINAL PAIN: 0
NAUSEA: 0
VOMITING: 0
CHEST TIGHTNESS: 0
ABDOMINAL DISTENTION: 0
EYE REDNESS: 0
VOICE CHANGE: 0
SINUS PRESSURE: 0
BLOOD IN STOOL: 0
EYE PAIN: 0
DIARRHEA: 0
COLOR CHANGE: 0
COUGH: 0
EYE DISCHARGE: 0
CONSTIPATION: 0
ANAL BLEEDING: 0
BACK PAIN: 0
SHORTNESS OF BREATH: 0

## 2022-08-25 NOTE — PROGRESS NOTES
muscle tone. Deep Tendon Reflexes: Reflexes normal.   Psychiatric:         Mood and Affect: Mood normal.         Behavior: Behavior normal.         Thought Content: Thought content normal.         Judgment: Judgment normal.        Vitals:    08/25/22 1048   BP: (!) 183/79   Pulse: (!) 47   Temp:    SpO2:          Assessment:      Diagnosis Orders   1. Dyslipidemia  Comprehensive Metabolic Panel    Lipid Panel      2. Stage 3 chronic kidney disease, unspecified whether stage 3a or 3b CKD (HCC)  Comprehensive Metabolic Panel      3. B12 deficiency  CBC    Folate    Vitamin B12      4. Vitamin D deficiency  Vitamin D 25 Hydroxy      5. Anemia, unspecified type        6. Gaytan's esophagus with dysplasia        7. Gastroesophageal reflux disease, unspecified whether esophagitis present        8. Essential hypertension        9. Senile osteoporosis        10. SARAH (generalized anxiety disorder)        11. Other insomnia        12. Neuropathy        13. Age-related osteoporosis without current pathological fracture        14. Chronic fatigue  CBC    TSH    Vitamin B12      15.  Hyperglycemia  Hemoglobin A1C            Plan:     Orders Placed This Encounter   Procedures    CBC    Comprehensive Metabolic Panel    Folate    Hemoglobin A1C    Lipid Panel    TSH    Vitamin B12    Vitamin D 25 Hydroxy       Outpatient Encounter Medications as of 8/25/2022   Medication Sig Dispense Refill    metoprolol succinate (TOPROL XL) 200 MG extended release tablet TAKE 1 TABLET BY MOUTH  DAILY 90 tablet 3    lisinopril (PRINIVIL;ZESTRIL) 10 MG tablet Take 1 tablet by mouth daily 90 tablet 1    gabapentin (NEURONTIN) 100 MG capsule TAKE 1 CAPSULE BY MOUTH  DAILY 90 capsule 0    atorvastatin (LIPITOR) 40 MG tablet TAKE 1 TABLET BY MOUTH  DAILY 90 tablet 3    pantoprazole (PROTONIX) 20 MG tablet TAKE 1 TABLET BY MOUTH  DAILY 90 tablet 3    clopidogrel (PLAVIX) 75 MG tablet TAKE 1 TABLET BY MOUTH  DAILY 90 tablet 3    Cyanocobalamin (B-12) 1000 MCG SUBL Place 1 tablet under the tongue daily 90 tablet 5    BABY ASPIRIN PO Take by mouth Indications: take 3 times a week ( she stopped and I have asked she resume as it was recommended by Cardio)       Calcium Carbonate-Vit D-Min (CALCIUM 1200) 4143-7388 MG-UNIT CHEW Take by mouth      denosumab (PROLIA) 60 MG/ML SOSY SC injection Inject 1 mL into the skin once for 1 dose 1 mL 5     No facility-administered encounter medications on file as of 8/25/2022.      20 minutes spent with patient counseling/educating on acute/chronic medical health problems, medications,  along with treatment options. Reviewed multiple labs/imaging/medications with patient during this time. Following Diet discussion/education was done on Cholesterol Diet . In addition Exercise plan and depression screen were discussed. Recent labs/imaging were discussed and reviewed with patient.

## 2022-09-06 DIAGNOSIS — G62.9 NEUROPATHY: ICD-10-CM

## 2022-09-07 RX ORDER — GABAPENTIN 100 MG/1
CAPSULE ORAL
Qty: 90 CAPSULE | Refills: 3 | OUTPATIENT
Start: 2022-09-07 | End: 2022-12-06

## 2022-09-08 ENCOUNTER — HOSPITAL ENCOUNTER (OUTPATIENT)
Age: 82
Discharge: HOME | End: 2022-09-08
Payer: MEDICARE

## 2022-09-08 DIAGNOSIS — E55.9 VITAMIN D DEFICIENCY: ICD-10-CM

## 2022-09-08 DIAGNOSIS — E53.8 B12 DEFICIENCY: ICD-10-CM

## 2022-09-08 DIAGNOSIS — N18.30 STAGE 3 CHRONIC KIDNEY DISEASE, UNSPECIFIED WHETHER STAGE 3A OR 3B CKD (HCC): ICD-10-CM

## 2022-09-08 DIAGNOSIS — R73.9 HYPERGLYCEMIA: ICD-10-CM

## 2022-09-08 DIAGNOSIS — E78.5 DYSLIPIDEMIA: ICD-10-CM

## 2022-09-08 DIAGNOSIS — R53.82 CHRONIC FATIGUE: ICD-10-CM

## 2022-09-08 LAB
ALBUMIN SERPL-MCNC: 4.7 G/DL (ref 3.5–5.2)
ALBUMIN/GLOBULIN RATIO: 2.5 (ref 1–2.5)
ALP BLD-CCNC: 70 U/L (ref 35–104)
ALT SERPL-CCNC: 16 U/L (ref 5–33)
ANION GAP SERPL CALCULATED.3IONS-SCNC: 15 MMOL/L (ref 9–17)
AST SERPL-CCNC: 24 U/L
BILIRUB SERPL-MCNC: 0.4 MG/DL (ref 0.3–1.2)
BUN BLDV-MCNC: 24 MG/DL (ref 8–23)
CALCIUM SERPL-MCNC: 9.4 MG/DL (ref 8.6–10.4)
CHLORIDE BLD-SCNC: 106 MMOL/L (ref 98–107)
CHOLESTEROL/HDL RATIO: 2
CHOLESTEROL: 235 MG/DL
CO2: 22 MMOL/L (ref 20–31)
CREAT SERPL-MCNC: 1.13 MG/DL (ref 0.5–0.9)
ESTIMATED AVERAGE GLUCOSE: 100 MG/DL
FOLATE: 7.4 NG/ML
GFR AFRICAN AMERICAN: 56 ML/MIN
GFR NON-AFRICAN AMERICAN: 46 ML/MIN
GFR SERPL CREATININE-BSD FRML MDRD: ABNORMAL ML/MIN/{1.73_M2}
GLUCOSE BLD-MCNC: 104 MG/DL (ref 70–99)
HBA1C MFR BLD: 5.1 % (ref 4–6)
HCT VFR BLD CALC: 39.4 % (ref 36.3–47.1)
HDLC SERPL-MCNC: 115 MG/DL
HEMOGLOBIN: 12.6 G/DL (ref 11.9–15.1)
LDL CHOLESTEROL: 96 MG/DL (ref 0–130)
MCH RBC QN AUTO: 31.7 PG (ref 25.2–33.5)
MCHC RBC AUTO-ENTMCNC: 32 G/DL (ref 28.4–34.8)
MCV RBC AUTO: 99 FL (ref 82.6–102.9)
NRBC AUTOMATED: 0 PER 100 WBC
PDW BLD-RTO: 12.7 % (ref 11.8–14.4)
PLATELET # BLD: 157 K/UL (ref 138–453)
PMV BLD AUTO: 10.9 FL (ref 8.1–13.5)
POTASSIUM SERPL-SCNC: 3.9 MMOL/L (ref 3.7–5.3)
RBC # BLD: 3.98 M/UL (ref 3.95–5.11)
SODIUM BLD-SCNC: 143 MMOL/L (ref 135–144)
TOTAL PROTEIN: 6.6 G/DL (ref 6.4–8.3)
TRIGL SERPL-MCNC: 122 MG/DL
TSH SERPL DL<=0.05 MIU/L-ACNC: 0.53 UIU/ML (ref 0.3–5)
VITAMIN B-12: >2000 PG/ML (ref 232–1245)
VITAMIN D 25-HYDROXY: 54.1 NG/ML
WBC # BLD: 4.7 K/UL (ref 3.5–11.3)

## 2022-09-08 PROCEDURE — 82607 VITAMIN B-12: CPT

## 2022-09-08 PROCEDURE — 80061 LIPID PANEL: CPT

## 2022-09-08 PROCEDURE — 36415 COLL VENOUS BLD VENIPUNCTURE: CPT

## 2022-09-08 PROCEDURE — 85027 COMPLETE CBC AUTOMATED: CPT

## 2022-09-08 PROCEDURE — 82746 ASSAY OF FOLIC ACID SERUM: CPT

## 2022-09-08 PROCEDURE — 84443 ASSAY THYROID STIM HORMONE: CPT

## 2022-09-08 PROCEDURE — 83036 HEMOGLOBIN GLYCOSYLATED A1C: CPT

## 2022-09-08 PROCEDURE — 80053 COMPREHEN METABOLIC PANEL: CPT

## 2022-09-08 PROCEDURE — 82306 VITAMIN D 25 HYDROXY: CPT

## 2022-09-26 ENCOUNTER — OFFICE VISIT (OUTPATIENT)
Dept: FAMILY MEDICINE CLINIC | Age: 82
End: 2022-09-26
Payer: MEDICARE

## 2022-09-26 VITALS
WEIGHT: 122.4 LBS | TEMPERATURE: 96.8 F | HEART RATE: 49 BPM | DIASTOLIC BLOOD PRESSURE: 89 MMHG | SYSTOLIC BLOOD PRESSURE: 139 MMHG | OXYGEN SATURATION: 98 % | HEIGHT: 64 IN | BODY MASS INDEX: 20.9 KG/M2

## 2022-09-26 DIAGNOSIS — F41.1 GAD (GENERALIZED ANXIETY DISORDER): ICD-10-CM

## 2022-09-26 DIAGNOSIS — K22.719 BARRETT'S ESOPHAGUS WITH DYSPLASIA: ICD-10-CM

## 2022-09-26 DIAGNOSIS — E78.5 DYSLIPIDEMIA: ICD-10-CM

## 2022-09-26 DIAGNOSIS — E53.8 B12 DEFICIENCY: ICD-10-CM

## 2022-09-26 DIAGNOSIS — R00.1 SINUS BRADYCARDIA: ICD-10-CM

## 2022-09-26 DIAGNOSIS — K21.9 GASTROESOPHAGEAL REFLUX DISEASE, UNSPECIFIED WHETHER ESOPHAGITIS PRESENT: ICD-10-CM

## 2022-09-26 DIAGNOSIS — E55.9 VITAMIN D DEFICIENCY: ICD-10-CM

## 2022-09-26 DIAGNOSIS — N18.30 STAGE 3 CHRONIC KIDNEY DISEASE, UNSPECIFIED WHETHER STAGE 3A OR 3B CKD (HCC): ICD-10-CM

## 2022-09-26 DIAGNOSIS — G47.09 OTHER INSOMNIA: ICD-10-CM

## 2022-09-26 DIAGNOSIS — Z23 NEED FOR INFLUENZA VACCINATION: Primary | ICD-10-CM

## 2022-09-26 DIAGNOSIS — G62.9 NEUROPATHY: ICD-10-CM

## 2022-09-26 DIAGNOSIS — M81.0 SENILE OSTEOPOROSIS: ICD-10-CM

## 2022-09-26 DIAGNOSIS — I10 ESSENTIAL HYPERTENSION: ICD-10-CM

## 2022-09-26 PROBLEM — D64.9 ANEMIA: Status: RESOLVED | Noted: 2020-06-09 | Resolved: 2022-09-26

## 2022-09-26 PROCEDURE — G0008 ADMIN INFLUENZA VIRUS VAC: HCPCS | Performed by: FAMILY MEDICINE

## 2022-09-26 PROCEDURE — 99214 OFFICE O/P EST MOD 30 MIN: CPT | Performed by: FAMILY MEDICINE

## 2022-09-26 PROCEDURE — 3288F FALL RISK ASSESSMENT DOCD: CPT | Performed by: FAMILY MEDICINE

## 2022-09-26 PROCEDURE — 1123F ACP DISCUSS/DSCN MKR DOCD: CPT | Performed by: FAMILY MEDICINE

## 2022-09-26 PROCEDURE — 90694 VACC AIIV4 NO PRSRV 0.5ML IM: CPT | Performed by: FAMILY MEDICINE

## 2022-09-26 RX ORDER — GABAPENTIN 100 MG/1
100 CAPSULE ORAL NIGHTLY
Qty: 90 CAPSULE | Refills: 0 | Status: SHIPPED | OUTPATIENT
Start: 2022-09-26 | End: 2022-12-25

## 2022-09-26 RX ORDER — LISINOPRIL 20 MG/1
20 TABLET ORAL DAILY
Qty: 90 TABLET | Refills: 0 | Status: SHIPPED | OUTPATIENT
Start: 2022-09-26 | End: 2022-09-26 | Stop reason: CLARIF

## 2022-09-26 RX ORDER — LISINOPRIL 10 MG/1
10 TABLET ORAL DAILY
Qty: 90 TABLET | Refills: 1 | Status: SHIPPED | OUTPATIENT
Start: 2022-09-26

## 2022-09-26 ASSESSMENT — ANXIETY QUESTIONNAIRES
5. BEING SO RESTLESS THAT IT IS HARD TO SIT STILL: 0
GAD7 TOTAL SCORE: 0
2. NOT BEING ABLE TO STOP OR CONTROL WORRYING: 0
7. FEELING AFRAID AS IF SOMETHING AWFUL MIGHT HAPPEN: 0
1. FEELING NERVOUS, ANXIOUS, OR ON EDGE: 0
6. BECOMING EASILY ANNOYED OR IRRITABLE: 0
4. TROUBLE RELAXING: 0
IF YOU CHECKED OFF ANY PROBLEMS ON THIS QUESTIONNAIRE, HOW DIFFICULT HAVE THESE PROBLEMS MADE IT FOR YOU TO DO YOUR WORK, TAKE CARE OF THINGS AT HOME, OR GET ALONG WITH OTHER PEOPLE: NOT DIFFICULT AT ALL
3. WORRYING TOO MUCH ABOUT DIFFERENT THINGS: 0

## 2022-09-26 ASSESSMENT — ENCOUNTER SYMPTOMS
EYE REDNESS: 0
RESPIRATORY NEGATIVE: 1
CONSTIPATION: 0
COUGH: 0
BLOOD IN STOOL: 0
STRIDOR: 0
EYES NEGATIVE: 1
GASTROINTESTINAL NEGATIVE: 1
SORE THROAT: 0
BACK PAIN: 0
VOMITING: 0
NAUSEA: 0
ALLERGIC/IMMUNOLOGIC NEGATIVE: 1
EYE DISCHARGE: 0
ABDOMINAL PAIN: 0
SHORTNESS OF BREATH: 0
DIARRHEA: 0
PHOTOPHOBIA: 0
EYE PAIN: 0

## 2022-09-26 ASSESSMENT — PATIENT HEALTH QUESTIONNAIRE - PHQ9
SUM OF ALL RESPONSES TO PHQ QUESTIONS 1-9: 0
1. LITTLE INTEREST OR PLEASURE IN DOING THINGS: 0
2. FEELING DOWN, DEPRESSED OR HOPELESS: 0
SUM OF ALL RESPONSES TO PHQ QUESTIONS 1-9: 0
SUM OF ALL RESPONSES TO PHQ9 QUESTIONS 1 & 2: 0
SUM OF ALL RESPONSES TO PHQ QUESTIONS 1-9: 0
SUM OF ALL RESPONSES TO PHQ QUESTIONS 1-9: 0

## 2022-09-26 NOTE — PROGRESS NOTES
Vaccine Information Sheet, \"Influenza - Inactivated\"  given to Carlton Anderson, or parent/legal guardian of  Carlton Anderson and verbalized understanding. Patient responses:    Have you ever had a reaction to a flu vaccine? No  Are you able to eat eggs without adverse effects? Yes  Do you have any current illness? No  Have you ever had Guillian [place] Syndrome? No    Flu vaccine given per order. Please see immunization tab.

## 2022-09-26 NOTE — PROGRESS NOTES
Mouth: Mucous membranes are moist.   Eyes:      Conjunctiva/sclera: Conjunctivae normal.      Pupils: Pupils are equal, round, and reactive to light. Cardiovascular:      Rate and Rhythm: Normal rate and regular rhythm. Heart sounds: Normal heart sounds. Pulmonary:      Effort: Pulmonary effort is normal.      Breath sounds: Normal breath sounds. Abdominal:      General: Bowel sounds are normal. There is no distension. Palpations: Abdomen is soft. Tenderness: There is no abdominal tenderness. Musculoskeletal:         General: Normal range of motion. Cervical back: Normal range of motion and neck supple. Skin:     General: Skin is warm and dry. Neurological:      General: No focal deficit present. Mental Status: She is alert and oriented to person, place, and time. Mental status is at baseline. Psychiatric:         Behavior: Behavior normal.         Thought Content: Thought content normal.         Judgment: Judgment normal.        Vitals:    09/26/22 1052   BP: (!) 157/79   Pulse: (!) 49   Temp:    SpO2:          Assessment:      Diagnosis Orders   1. Need for influenza vaccination  Influenza, FLUAD, (age 72 y+), IM, Preservative Free, 0.5 mL      2. Sinus bradycardia        3. Dyslipidemia        4. B12 deficiency        5. Vitamin D deficiency        6. Gaytan's esophagus with dysplasia        7. Gastroesophageal reflux disease, unspecified whether esophagitis present        8. Essential hypertension  Basic Metabolic Panel      9. SARAH (generalized anxiety disorder)        10. Other insomnia        11. Stage 3 chronic kidney disease, unspecified whether stage 3a or 3b CKD ([facility] [address]        12.  Neuropathy  gabapentin (NEURONTIN) 100 MG capsule             Plan:     Orders Placed This Encounter   Procedures    Influenza, FLUAD, (age 72 y+), IM, Preservative Free, 0.5 mL    Basic Metabolic Panel       Outpatient Encounter Medications as of 9/26/2022   Medication Sig Dispense Refill    lisinopril (PRINIVIL;ZESTRIL) 20 MG tablet Take 1 tablet by mouth daily 90 tablet 0    gabapentin (NEURONTIN) 100 MG capsule Take 1 capsule by mouth nightly for 90 days. Intended supply: 90 days 90 capsule 0    metoprolol succinate (TOPROL XL) 200 MG extended release tablet TAKE 1 TABLET BY MOUTH  DAILY 90 tablet 3    atorvastatin (LIPITOR) 40 MG tablet TAKE 1 TABLET BY MOUTH  DAILY 90 tablet 3    pantoprazole (PROTONIX) 20 MG tablet TAKE 1 TABLET BY MOUTH  DAILY 90 tablet 3    clopidogrel (PLAVIX) 75 MG tablet TAKE 1 TABLET BY MOUTH  DAILY 90 tablet 3    Cyanocobalamin (B-12) 1000 MCG SUBL Place 1 tablet under the tongue daily 90 tablet 5    BABY ASPIRIN PO Take by mouth Indications: take 3 times a week ( she stopped and I have asked she resume as it was recommended by Cardio)       Calcium Carbonate-Vit D-Min (CALCIUM 1200) 2744-8060 MG-UNIT CHEW Take by mouth      [DISCONTINUED] lisinopril (PRINIVIL;ZESTRIL) 10 MG tablet Take 1 tablet by mouth daily 90 tablet 1    [DISCONTINUED] gabapentin (NEURONTIN) 100 MG capsule TAKE 1 CAPSULE BY MOUTH  DAILY 90 capsule 0    denosumab (PROLIA) 60 MG/ML SOSY SC injection Inject 1 mL into the skin once for 1 dose 1 mL 5     No facility-administered encounter medications on file as of 9/26/2022.      20 minutes spent with patient counseling/educating on acute/chronic medical health problems, medications,  along with treatment options. Reviewed multiple labs/imaging/medications with patient during this time. Following Diet discussion/education was done on Dash Diet. In addition Exercise plan and depression screen were discussed. Recent labs/imaging were discussed and reviewed with patient.

## 2022-11-06 DIAGNOSIS — R42 VERTIGO: ICD-10-CM

## 2022-11-07 RX ORDER — MECLIZINE HCL 12.5 MG/1
TABLET ORAL
Qty: 90 TABLET | Refills: 0 | Status: SHIPPED | OUTPATIENT
Start: 2022-11-07

## 2022-11-17 ENCOUNTER — HOSPITAL ENCOUNTER (OUTPATIENT)
Dept: MAMMOGRAPHY | Age: 82
Discharge: HOME | End: 2022-11-19
Payer: MEDICARE

## 2022-11-17 DIAGNOSIS — Z12.31 VISIT FOR SCREENING MAMMOGRAM: ICD-10-CM

## 2022-11-17 PROCEDURE — 77067 SCR MAMMO BI INCL CAD: CPT

## 2022-11-20 DIAGNOSIS — G62.9 NEUROPATHY: ICD-10-CM

## 2022-11-21 ENCOUNTER — TELEPHONE (OUTPATIENT)
Dept: FAMILY MEDICINE CLINIC | Age: 82
End: 2022-11-21

## 2022-11-21 DIAGNOSIS — R92.8 ABNORMAL MAMMOGRAM: Primary | ICD-10-CM

## 2022-11-21 DIAGNOSIS — N63.20 MASS OF LEFT BREAST, UNSPECIFIED QUADRANT: Primary | ICD-10-CM

## 2022-11-21 RX ORDER — GABAPENTIN 100 MG/1
CAPSULE ORAL
Qty: 90 CAPSULE | Refills: 3 | OUTPATIENT
Start: 2022-11-21

## 2022-11-21 NOTE — TELEPHONE ENCOUNTER
Pt called and stated that she had a mammogram on 11/17/2022 and she is supposed to have a US of her left breast and [facility] told her to get the order from her PCP please advise

## 2022-11-21 NOTE — TELEPHONE ENCOUNTER
The codes you used for her diagnostic garett is not covered by Medicare. Please resubmit. Call pt when completed.

## 2022-11-21 NOTE — TELEPHONE ENCOUNTER
Notified pt and she stated that she don't need this med right now and that is just the pharmacy calling it in early

## 2022-12-20 ENCOUNTER — HOSPITAL ENCOUNTER (OUTPATIENT)
Dept: ULTRASOUND IMAGING | Age: 82
Discharge: HOME | End: 2022-12-22
Payer: MEDICARE

## 2022-12-20 ENCOUNTER — HOSPITAL ENCOUNTER (OUTPATIENT)
Dept: MAMMOGRAPHY | Age: 82
Discharge: HOME | End: 2022-12-22
Payer: MEDICARE

## 2022-12-20 DIAGNOSIS — R92.8 ABNORMAL MAMMOGRAM: ICD-10-CM

## 2022-12-20 PROCEDURE — G0279 TOMOSYNTHESIS, MAMMO: HCPCS

## 2023-01-10 ENCOUNTER — TELEPHONE (OUTPATIENT)
Dept: FAMILY MEDICINE CLINIC | Age: 83
End: 2023-01-10

## 2023-01-10 NOTE — TELEPHONE ENCOUNTER
Call from pt she is in [place], her BP was 224/128 she went to the ER was given several test and put on medication that she is having a reaction to. Her skin is crawling, numbness of the lips. She will call the hosp to send you the records. She does not want to go back to ER what else can she do.

## 2023-01-16 ENCOUNTER — OFFICE VISIT (OUTPATIENT)
Dept: FAMILY MEDICINE CLINIC | Age: 83
End: 2023-01-16

## 2023-01-16 VITALS
TEMPERATURE: 97 F | HEIGHT: 64 IN | HEART RATE: 52 BPM | OXYGEN SATURATION: 98 % | SYSTOLIC BLOOD PRESSURE: 138 MMHG | BODY MASS INDEX: 20.45 KG/M2 | WEIGHT: 119.8 LBS | DIASTOLIC BLOOD PRESSURE: 60 MMHG

## 2023-01-16 DIAGNOSIS — M81.0 AGE-RELATED OSTEOPOROSIS WITHOUT CURRENT PATHOLOGICAL FRACTURE: ICD-10-CM

## 2023-01-16 DIAGNOSIS — K22.719 BARRETT'S ESOPHAGUS WITH DYSPLASIA: ICD-10-CM

## 2023-01-16 DIAGNOSIS — R42 VERTIGO: ICD-10-CM

## 2023-01-16 DIAGNOSIS — Z09 HOSPITAL DISCHARGE FOLLOW-UP: ICD-10-CM

## 2023-01-16 DIAGNOSIS — G47.09 OTHER INSOMNIA: ICD-10-CM

## 2023-01-16 DIAGNOSIS — E78.5 DYSLIPIDEMIA: ICD-10-CM

## 2023-01-16 DIAGNOSIS — I10 ESSENTIAL HYPERTENSION: Primary | ICD-10-CM

## 2023-01-16 DIAGNOSIS — N18.30 STAGE 3 CHRONIC KIDNEY DISEASE, UNSPECIFIED WHETHER STAGE 3A OR 3B CKD (HCC): ICD-10-CM

## 2023-01-16 DIAGNOSIS — F41.1 GAD (GENERALIZED ANXIETY DISORDER): ICD-10-CM

## 2023-01-16 DIAGNOSIS — H93.13 BILATERAL TINNITUS: ICD-10-CM

## 2023-01-16 DIAGNOSIS — G62.9 NEUROPATHY: ICD-10-CM

## 2023-01-16 DIAGNOSIS — R00.1 SINUS BRADYCARDIA: ICD-10-CM

## 2023-01-16 DIAGNOSIS — K21.9 GASTROESOPHAGEAL REFLUX DISEASE, UNSPECIFIED WHETHER ESOPHAGITIS PRESENT: ICD-10-CM

## 2023-01-16 PROBLEM — I16.0 HYPERTENSIVE URGENCY: Status: ACTIVE | Noted: 2023-01-08

## 2023-01-16 PROBLEM — R77.8 OTHER SPECIFIED ABNORMALITIES OF PLASMA PROTEINS: Status: RESOLVED | Noted: 2023-01-06 | Resolved: 2023-01-16

## 2023-01-16 PROBLEM — I16.0 HYPERTENSIVE URGENCY: Status: RESOLVED | Noted: 2023-01-08 | Resolved: 2023-01-16

## 2023-01-16 PROBLEM — M54.16 RADICULOPATHY, LUMBAR REGION: Status: ACTIVE | Noted: 2022-05-25

## 2023-01-16 PROBLEM — R77.8 OTHER SPECIFIED ABNORMALITIES OF PLASMA PROTEINS: Status: ACTIVE | Noted: 2023-01-06

## 2023-01-16 RX ORDER — HYDRALAZINE HYDROCHLORIDE 50 MG/1
TABLET, FILM COATED ORAL
COMMUNITY
Start: 2023-01-09 | End: 2023-01-16 | Stop reason: DRUGHIGH

## 2023-01-16 RX ORDER — HYDRALAZINE HYDROCHLORIDE 10 MG/1
TABLET, FILM COATED ORAL
Qty: 90 TABLET | Refills: 3 | Status: SHIPPED | OUTPATIENT
Start: 2023-01-16

## 2023-01-16 RX ORDER — AMLODIPINE BESYLATE 5 MG/1
5 TABLET ORAL DAILY
Qty: 90 TABLET | Refills: 1 | Status: SHIPPED | OUTPATIENT
Start: 2023-01-16

## 2023-01-16 RX ORDER — LISINOPRIL 20 MG/1
TABLET ORAL
COMMUNITY
Start: 2023-01-09

## 2023-01-16 RX ORDER — GABAPENTIN 100 MG/1
100 CAPSULE ORAL DAILY
Qty: 90 CAPSULE | Refills: 0 | Status: SHIPPED | OUTPATIENT
Start: 2023-01-16 | End: 2023-04-16

## 2023-01-16 RX ORDER — B-COMPLEX WITH VITAMIN C
1 TABLET ORAL DAILY
COMMUNITY

## 2023-01-16 RX ORDER — OMEGA-3S/DHA/EPA/FISH OIL/D3 300MG-1000
CAPSULE ORAL DAILY
COMMUNITY

## 2023-01-16 ASSESSMENT — ANXIETY QUESTIONNAIRES
5. BEING SO RESTLESS THAT IT IS HARD TO SIT STILL: 0
7. FEELING AFRAID AS IF SOMETHING AWFUL MIGHT HAPPEN: 0
3. WORRYING TOO MUCH ABOUT DIFFERENT THINGS: 0
4. TROUBLE RELAXING: 0
IF YOU CHECKED OFF ANY PROBLEMS ON THIS QUESTIONNAIRE, HOW DIFFICULT HAVE THESE PROBLEMS MADE IT FOR YOU TO DO YOUR WORK, TAKE CARE OF THINGS AT HOME, OR GET ALONG WITH OTHER PEOPLE: NOT DIFFICULT AT ALL
1. FEELING NERVOUS, ANXIOUS, OR ON EDGE: 0
6. BECOMING EASILY ANNOYED OR IRRITABLE: 0
2. NOT BEING ABLE TO STOP OR CONTROL WORRYING: 0
GAD7 TOTAL SCORE: 0

## 2023-01-16 ASSESSMENT — PATIENT HEALTH QUESTIONNAIRE - PHQ9
SUM OF ALL RESPONSES TO PHQ QUESTIONS 1-9: 0
2. FEELING DOWN, DEPRESSED OR HOPELESS: 0
SUM OF ALL RESPONSES TO PHQ9 QUESTIONS 1 & 2: 0
1. LITTLE INTEREST OR PLEASURE IN DOING THINGS: 0
SUM OF ALL RESPONSES TO PHQ QUESTIONS 1-9: 0
1. LITTLE INTEREST OR PLEASURE IN DOING THINGS: 0
2. FEELING DOWN, DEPRESSED OR HOPELESS: 0
SUM OF ALL RESPONSES TO PHQ9 QUESTIONS 1 & 2: 0

## 2023-01-16 ASSESSMENT — ENCOUNTER SYMPTOMS
ABDOMINAL PAIN: 0
VOMITING: 0
EYE DISCHARGE: 0
COUGH: 0
BACK PAIN: 0
EYE PAIN: 0
DIARRHEA: 0
CONSTIPATION: 0
EYE REDNESS: 0
SORE THROAT: 0
NAUSEA: 0
BLOOD IN STOOL: 0
STRIDOR: 0
PHOTOPHOBIA: 0
SHORTNESS OF BREATH: 0

## 2023-01-16 NOTE — PROGRESS NOTES
Subjective:      Patient ID: Verner Loron is a 81 y.o. female. Here for a follow up of her multiple medical problems. States concerned about her BP. Mood, sleep, anxiety ok  Bowels and bladder ok  Breathing- OK  Taking all meds as prescribed  Last labs were in sept 2022- reviewed with patient. Renal function noted. Review of Systems   Constitutional:  Negative for chills and fever. HENT:  Negative for congestion, ear pain, hearing loss, nosebleeds, sore throat and tinnitus. Eyes:  Negative for photophobia, pain, discharge and redness. Respiratory:  Negative for cough, shortness of breath and stridor. Cardiovascular:  Negative for chest pain, palpitations and leg swelling. Gastrointestinal:  Negative for abdominal pain, blood in stool, constipation, diarrhea, nausea and vomiting. Endocrine: Negative for polydipsia. Genitourinary:  Negative for dysuria, flank pain, frequency, hematuria and urgency. Musculoskeletal:  Negative for back pain, myalgias and neck pain. Skin:  Negative for rash. Allergic/Immunologic: Negative for environmental allergies. Neurological:  Negative for dizziness, tremors, seizures, weakness and headaches. Hematological:  Does not bruise/bleed easily. Psychiatric/Behavioral:  Negative for hallucinations and suicidal ideas. The patient is not nervous/anxious. Objective:   Physical Exam  Constitutional:       General: She is not in acute distress. Appearance: She is well-developed. HENT:      Head: Normocephalic and atraumatic. Right Ear: External ear normal.      Left Ear: External ear normal.      Nose: Nose normal.      Mouth/Throat:      Mouth: Mucous membranes are moist.   Eyes:      Conjunctiva/sclera: Conjunctivae normal.      Pupils: Pupils are equal, round, and reactive to light. Cardiovascular:      Rate and Rhythm: Normal rate and regular rhythm. Heart sounds: Normal heart sounds.    Pulmonary:      Effort: Pulmonary effort is normal.      Breath sounds: Normal breath sounds. Abdominal:      General: Bowel sounds are normal. There is no distension. Palpations: Abdomen is soft. Tenderness: There is no abdominal tenderness. Musculoskeletal:         General: Normal range of motion. Cervical back: Normal range of motion and neck supple. Skin:     General: Skin is warm and dry. Neurological:      General: No focal deficit present. Mental Status: She is alert and oriented to person, place, and time. Mental status is at baseline. Psychiatric:         Mood and Affect: Mood normal.         Behavior: Behavior normal.         Thought Content: Thought content normal.         Judgment: Judgment normal.        Vitals:    01/16/23 1146   BP: (!) 150/67   Pulse: 52   Temp:    SpO2:          Assessment:      Diagnosis Orders   1. Essential hypertension        2. Stage 3 chronic kidney disease, unspecified whether stage 3a or 3b CKD ([facility] [address]        3. Gastroesophageal reflux disease, unspecified whether esophagitis present        4. Dyslipidemia        5. SARAH (generalized anxiety disorder)        6. Other insomnia        7. Neuropathy        8. Gaytan's esophagus with dysplasia        9. Age-related osteoporosis without current pathological fracture        10. Sinus bradycardia              Plan:     Orders Placed This Encounter   Procedures    [facility] - Kimberly Appiah MD, Otolaryngology, [facility] [address] CURRENT OUTPATIENT MED LIST       Outpatient Encounter Medications as of 1/16/2023   Medication Sig Dispense Refill    lisinopril (PRINIVIL;ZESTRIL) 20 MG tablet TAKE 1 (ONE) TABLET (20 MG TOTAL) BY MOUTH EVERY MORNING .       Calcium Carbonate-Vitamin D (OYSTER SHELL CALCIUM/D) 500-5 MG-MCG TABS Take 1 tablet by mouth daily      vitamin D3 (CHOLECALCIFEROL) 10 MCG (400 UNIT) TABS tablet Take by mouth daily      amLODIPine (NORVASC) 5 MG tablet Take 1 tablet by mouth daily 90 tablet 1    hydrALAZINE (APRESOLINE) 10 MG tablet 1 tab TID for SBP >140  2 tabs TID for BP > 150 90 tablet 3    gabapentin (NEURONTIN) 100 MG capsule Take 1 capsule by mouth daily for 90 days. 90 capsule 0    meclizine (ANTIVERT) 12.5 MG tablet TAKE 1 TABLET BY MOUTH AT  NIGHT AS NEEDED FOR  DIZZINESS 90 tablet 0    metoprolol succinate (TOPROL XL) 200 MG extended release tablet TAKE 1 TABLET BY MOUTH  DAILY 90 tablet 3    atorvastatin (LIPITOR) 40 MG tablet TAKE 1 TABLET BY MOUTH  DAILY 90 tablet 3    pantoprazole (PROTONIX) 20 MG tablet TAKE 1 TABLET BY MOUTH  DAILY 90 tablet 3    clopidogrel (PLAVIX) 75 MG tablet TAKE 1 TABLET BY MOUTH  DAILY 90 tablet 3    Cyanocobalamin (B-12) 1000 MCG SUBL Place 1 tablet under the tongue daily 90 tablet 5    BABY ASPIRIN PO Take by mouth Indications: take 3 times a week ( she stopped and I have asked she resume as it was recommended by Cardio)       Calcium Carbonate-Vit D-Min (CALCIUM 1200) 0951-7929 MG-UNIT CHEW Take by mouth      [DISCONTINUED] hydrALAZINE (APRESOLINE) 50 MG tablet TAKE 1 (ONE) TABLET (50 MG TOTAL) BY MOUTH EVERY 8 (EIGHT) HOURS . [DISCONTINUED] gabapentin (NEURONTIN) 100 MG capsule Take 1 capsule by mouth nightly for 90 days. Intended supply: 90 days 90 capsule 0    [DISCONTINUED] lisinopril (PRINIVIL;ZESTRIL) 10 MG tablet Take 1 tablet by mouth daily 90 tablet 1    denosumab (PROLIA) 60 MG/ML SOSY SC injection Inject 1 mL into the skin once for 1 dose 1 mL 5     No facility-administered encounter medications on file as of 1/16/2023.      20 minutes spent with patient counseling/educating on acute/chronic medical health problems, medications,  along with treatment options. Reviewed multiple labs/imaging/medications with patient during this time. Following Diet discussion/education was done on Dash Diet and Cholesterol Diet . In addition Exercise plan and depression screen were discussed. Recent labs/imaging were discussed and reviewed with patient.

## 2023-01-19 ENCOUNTER — TELEPHONE (OUTPATIENT)
Dept: FAMILY MEDICINE CLINIC | Age: 83
End: 2023-01-19

## 2023-01-20 DIAGNOSIS — K22.719 BARRETT'S ESOPHAGUS WITH DYSPLASIA: ICD-10-CM

## 2023-01-20 DIAGNOSIS — R42 VERTIGO: ICD-10-CM

## 2023-01-20 DIAGNOSIS — K21.9 GASTROESOPHAGEAL REFLUX DISEASE, UNSPECIFIED WHETHER ESOPHAGITIS PRESENT: ICD-10-CM

## 2023-01-20 RX ORDER — PANTOPRAZOLE SODIUM 20 MG/1
20 TABLET, DELAYED RELEASE ORAL DAILY
Refills: 0 | OUTPATIENT
Start: 2023-01-20

## 2023-01-20 RX ORDER — MECLIZINE HCL 12.5 MG/1
TABLET ORAL
Qty: 90 TABLET | Refills: 0 | Status: SHIPPED | OUTPATIENT
Start: 2023-01-20

## 2023-01-20 NOTE — TELEPHONE ENCOUNTER
She will need to get a follow up appt with GI and I need their note after reviewing old GI records to cont prescribing the PPI

## 2023-01-23 ENCOUNTER — OFFICE VISIT (OUTPATIENT)
Dept: FAMILY MEDICINE CLINIC | Age: 83
End: 2023-01-23
Payer: MEDICARE

## 2023-01-23 VITALS
DIASTOLIC BLOOD PRESSURE: 64 MMHG | HEART RATE: 57 BPM | TEMPERATURE: 97.2 F | HEIGHT: 64 IN | WEIGHT: 120 LBS | SYSTOLIC BLOOD PRESSURE: 131 MMHG | BODY MASS INDEX: 20.49 KG/M2 | OXYGEN SATURATION: 97 %

## 2023-01-23 DIAGNOSIS — K21.9 GASTROESOPHAGEAL REFLUX DISEASE, UNSPECIFIED WHETHER ESOPHAGITIS PRESENT: ICD-10-CM

## 2023-01-23 DIAGNOSIS — F41.1 GAD (GENERALIZED ANXIETY DISORDER): Primary | ICD-10-CM

## 2023-01-23 DIAGNOSIS — K22.719 BARRETT'S ESOPHAGUS WITH DYSPLASIA: ICD-10-CM

## 2023-01-23 DIAGNOSIS — E53.8 B12 DEFICIENCY: ICD-10-CM

## 2023-01-23 DIAGNOSIS — E55.9 VITAMIN D DEFICIENCY: ICD-10-CM

## 2023-01-23 DIAGNOSIS — N18.30 STAGE 3 CHRONIC KIDNEY DISEASE, UNSPECIFIED WHETHER STAGE 3A OR 3B CKD (HCC): ICD-10-CM

## 2023-01-23 DIAGNOSIS — I10 ESSENTIAL HYPERTENSION: ICD-10-CM

## 2023-01-23 DIAGNOSIS — E78.5 DYSLIPIDEMIA: ICD-10-CM

## 2023-01-23 DIAGNOSIS — G47.09 OTHER INSOMNIA: ICD-10-CM

## 2023-01-23 PROBLEM — R42 DIZZINESS AND GIDDINESS: Status: ACTIVE | Noted: 2023-01-06

## 2023-01-23 PROCEDURE — 99214 OFFICE O/P EST MOD 30 MIN: CPT | Performed by: FAMILY MEDICINE

## 2023-01-23 PROCEDURE — 1123F ACP DISCUSS/DSCN MKR DOCD: CPT | Performed by: FAMILY MEDICINE

## 2023-01-23 PROCEDURE — 3078F DIAST BP <80 MM HG: CPT | Performed by: FAMILY MEDICINE

## 2023-01-23 PROCEDURE — 3075F SYST BP GE 130 - 139MM HG: CPT | Performed by: FAMILY MEDICINE

## 2023-01-23 RX ORDER — AMLODIPINE BESYLATE 5 MG/1
5 TABLET ORAL DAILY
Qty: 90 TABLET | Refills: 3 | Status: SHIPPED | OUTPATIENT
Start: 2023-01-23

## 2023-01-23 ASSESSMENT — ENCOUNTER SYMPTOMS
BACK PAIN: 0
EYE REDNESS: 0
SORE THROAT: 0
SHORTNESS OF BREATH: 0
CONSTIPATION: 0
EYE DISCHARGE: 0
PHOTOPHOBIA: 0
COUGH: 0
STRIDOR: 0
DIARRHEA: 0
ABDOMINAL PAIN: 0
EYE PAIN: 0
BLOOD IN STOOL: 0
NAUSEA: 0
VOMITING: 0

## 2023-01-23 ASSESSMENT — PATIENT HEALTH QUESTIONNAIRE - PHQ9
1. LITTLE INTEREST OR PLEASURE IN DOING THINGS: 0
SUM OF ALL RESPONSES TO PHQ QUESTIONS 1-9: 0
SUM OF ALL RESPONSES TO PHQ9 QUESTIONS 1 & 2: 0
SUM OF ALL RESPONSES TO PHQ QUESTIONS 1-9: 0
2. FEELING DOWN, DEPRESSED OR HOPELESS: 0
SUM OF ALL RESPONSES TO PHQ QUESTIONS 1-9: 0
SUM OF ALL RESPONSES TO PHQ QUESTIONS 1-9: 0

## 2023-01-23 ASSESSMENT — ANXIETY QUESTIONNAIRES
5. BEING SO RESTLESS THAT IT IS HARD TO SIT STILL: 0
GAD7 TOTAL SCORE: 0
4. TROUBLE RELAXING: 0
IF YOU CHECKED OFF ANY PROBLEMS ON THIS QUESTIONNAIRE, HOW DIFFICULT HAVE THESE PROBLEMS MADE IT FOR YOU TO DO YOUR WORK, TAKE CARE OF THINGS AT HOME, OR GET ALONG WITH OTHER PEOPLE: NOT DIFFICULT AT ALL
1. FEELING NERVOUS, ANXIOUS, OR ON EDGE: 0
2. NOT BEING ABLE TO STOP OR CONTROL WORRYING: 0
7. FEELING AFRAID AS IF SOMETHING AWFUL MIGHT HAPPEN: 0
6. BECOMING EASILY ANNOYED OR IRRITABLE: 0
3. WORRYING TOO MUCH ABOUT DIFFERENT THINGS: 0

## 2023-01-23 NOTE — PROGRESS NOTES
Subjective:      Patient ID: Henrietta Prakash is a 81 y.o. female. She has been tolerating the 5 mg dose of Amlodipine and BP are well under control with the same. Mood, sleep, anxiety are ok. Misbah and bladder ok as well. Review of Systems   Constitutional:  Negative for chills and fever. HENT:  Negative for congestion, ear pain, hearing loss, nosebleeds, sore throat and tinnitus. Eyes:  Negative for photophobia, pain, discharge and redness. Respiratory:  Negative for cough, shortness of breath and stridor. Cardiovascular:  Negative for chest pain, palpitations and leg swelling. Gastrointestinal:  Negative for abdominal pain, blood in stool, constipation, diarrhea, nausea and vomiting. Endocrine: Negative for polydipsia. Genitourinary:  Negative for dysuria, flank pain, frequency, hematuria and urgency. Musculoskeletal:  Negative for back pain, myalgias and neck pain. Skin:  Negative for rash. Allergic/Immunologic: Negative for environmental allergies. Neurological:  Negative for dizziness, tremors, seizures, weakness and headaches. Hematological:  Does not bruise/bleed easily. Psychiatric/Behavioral:  Negative for hallucinations and suicidal ideas. The patient is not nervous/anxious. Objective:   Physical Exam  Constitutional:       General: She is not in acute distress. Appearance: She is well-developed. HENT:      Head: Normocephalic and atraumatic. Right Ear: External ear normal.      Left Ear: External ear normal.      Nose: Nose normal.      Mouth/Throat:      Mouth: Mucous membranes are moist.   Eyes:      Conjunctiva/sclera: Conjunctivae normal.      Pupils: Pupils are equal, round, and reactive to light. Cardiovascular:      Rate and Rhythm: Normal rate and regular rhythm. Pulses: Normal pulses. Heart sounds: Normal heart sounds. Pulmonary:      Effort: Pulmonary effort is normal.      Breath sounds: Normal breath sounds.    Abdominal: General: Bowel sounds are normal. There is no distension. Palpations: Abdomen is soft. Tenderness: There is no abdominal tenderness. Musculoskeletal:         General: Normal range of motion. Cervical back: Normal range of motion and neck supple. Skin:     General: Skin is warm and dry. Neurological:      General: No focal deficit present. Mental Status: She is alert and oriented to person, place, and time. Mental status is at baseline. Psychiatric:         Mood and Affect: Mood normal.         Behavior: Behavior normal.         Thought Content: Thought content normal.         Judgment: Judgment normal.        Vitals:    01/23/23 1047   BP: 131/64   Pulse: 57   Temp: 97.2 °F (36.2 °C)   SpO2: 97%         Assessment:      Diagnosis Orders   1. SARAH (generalized anxiety disorder)        2. Other insomnia        3. Dyslipidemia        4. Vitamin D deficiency        5. Gastroesophageal reflux disease, unspecified whether esophagitis present        6. Essential hypertension  amLODIPine (NORVASC) 5 MG tablet      7. B12 deficiency        8. Stage 3 chronic kidney disease, unspecified whether stage 3a or 3b CKD ([facility] [address]        9. Gaytan's esophagus with dysplasia               Plan:   No orders of the defined types were placed in this encounter. Outpatient Encounter Medications as of 1/23/2023   Medication Sig Dispense Refill    amLODIPine (NORVASC) 5 MG tablet Take 1 tablet by mouth daily 90 tablet 3    meclizine (ANTIVERT) 12.5 MG tablet TAKE 1 TABLET BY MOUTH AT NIGHT  AS NEEDED FOR DIZZINESS 90 tablet 0    lisinopril (PRINIVIL;ZESTRIL) 20 MG tablet TAKE 1 (ONE) TABLET (20 MG TOTAL) BY MOUTH EVERY MORNING . Calcium Carbonate-Vitamin D (OYSTER SHELL CALCIUM/D) 500-5 MG-MCG TABS Take 1 tablet by mouth daily      vitamin D3 (CHOLECALCIFEROL) 10 MCG (400 UNIT) TABS tablet Take by mouth daily      gabapentin (NEURONTIN) 100 MG capsule Take 1 capsule by mouth daily for 90 days.  90 capsule 0    metoprolol succinate (TOPROL XL) 200 MG extended release tablet TAKE 1 TABLET BY MOUTH  DAILY 90 tablet 3    atorvastatin (LIPITOR) 40 MG tablet TAKE 1 TABLET BY MOUTH  DAILY 90 tablet 3    pantoprazole (PROTONIX) 20 MG tablet TAKE 1 TABLET BY MOUTH  DAILY 90 tablet 3    clopidogrel (PLAVIX) 75 MG tablet TAKE 1 TABLET BY MOUTH  DAILY 90 tablet 3    Cyanocobalamin (B-12) 1000 MCG SUBL Place 1 tablet under the tongue daily 90 tablet 5    BABY ASPIRIN PO Take by mouth Indications: take 3 times a week ( she stopped and I have asked she resume as it was recommended by Cardio)       Calcium Carbonate-Vit D-Min (CALCIUM 1200) 0339-8913 MG-UNIT CHEW Take by mouth      [DISCONTINUED] amLODIPine (NORVASC) 5 MG tablet Take 1 tablet by mouth daily 90 tablet 1    [DISCONTINUED] hydrALAZINE (APRESOLINE) 10 MG tablet 1 tab TID for SBP >140  2 tabs TID for BP > 150 90 tablet 3    denosumab (PROLIA) 60 MG/ML SOSY SC injection Inject 1 mL into the skin once for 1 dose 1 mL 5     No facility-administered encounter medications on file as of 1/23/2023.      20 minutes spent with patient counseling/educating on acute/chronic medical health problems, medications,  along with treatment options.  Reviewed multiple labs/imaging/medications with patient during this time.  Following Diet discussion/education was done on Dash Diet.  In addition Exercise plan and depression screen were discussed.  Recent labs/imaging were discussed and reviewed with patient.

## 2023-02-01 ENCOUNTER — TELEPHONE (OUTPATIENT)
Dept: FAMILY MEDICINE CLINIC | Age: 83
End: 2023-02-01

## 2023-02-01 DIAGNOSIS — I10 ESSENTIAL HYPERTENSION: Primary | ICD-10-CM

## 2023-02-01 RX ORDER — CARVEDILOL 25 MG/1
25 TABLET ORAL 2 TIMES DAILY
Qty: 60 TABLET | Refills: 3 | Status: SHIPPED | OUTPATIENT
Start: 2023-02-01

## 2023-02-01 NOTE — TELEPHONE ENCOUNTER
Spoke with pt and she understand that she has to call the pharmacy here in [place] and have them cancel the order so she can get her med [place] the [place]

## 2023-02-01 NOTE — TELEPHONE ENCOUNTER
Pt called and stated that her feet and ankles are swollen and tingling and and hot  she thinks due to the amlodipine and she want to know if that will go away or should she stop taking the med she stated that it is working for her but she has these sx or what you want her to do please advise

## 2023-02-01 NOTE — TELEPHONE ENCOUNTER
Called in the med to the pharmacy of choice per pt and notified pt and she has a questions about the lisinopril please call pt

## 2023-02-01 NOTE — TELEPHONE ENCOUNTER
Notified pt of your message and she stated that she is not in [place] right now here is the pharmacy she can go to its [address] in [address] [phone] please advise

## 2023-02-09 ENCOUNTER — TELEPHONE (OUTPATIENT)
Dept: FAMILY MEDICINE CLINIC | Age: 83
End: 2023-02-09

## 2023-02-09 NOTE — TELEPHONE ENCOUNTER
Call from pt her BP today was 161/75 5 min later 154/83. She has swelling in her feet and hands, sweating, slurred speech all since starting the Carvedilol. She is asking if she can go back to her Lisinopril and Metoprolol. She is out of town and the pharm is  [facility] in [place] .

## 2023-02-14 DIAGNOSIS — I10 ESSENTIAL HYPERTENSION: ICD-10-CM

## 2023-02-14 RX ORDER — CARVEDILOL 25 MG/1
25 TABLET ORAL 2 TIMES DAILY
Qty: 180 TABLET | Refills: 3 | Status: SHIPPED | OUTPATIENT
Start: 2023-02-14

## 2023-02-14 NOTE — TELEPHONE ENCOUNTER
Pt called and stated that she need this med to go to [facility] not [facility] and she need 90 day supply

## 2023-02-23 ENCOUNTER — OFFICE VISIT (OUTPATIENT)
Dept: FAMILY MEDICINE CLINIC | Age: 83
End: 2023-02-23

## 2023-02-23 VITALS
TEMPERATURE: 97.1 F | BODY MASS INDEX: 21.32 KG/M2 | DIASTOLIC BLOOD PRESSURE: 90 MMHG | OXYGEN SATURATION: 100 % | HEART RATE: 69 BPM | WEIGHT: 124.2 LBS | SYSTOLIC BLOOD PRESSURE: 160 MMHG

## 2023-02-23 DIAGNOSIS — I10 ESSENTIAL HYPERTENSION: Primary | ICD-10-CM

## 2023-02-23 DIAGNOSIS — K21.9 GASTROESOPHAGEAL REFLUX DISEASE, UNSPECIFIED WHETHER ESOPHAGITIS PRESENT: ICD-10-CM

## 2023-02-23 DIAGNOSIS — E78.5 DYSLIPIDEMIA: ICD-10-CM

## 2023-02-23 DIAGNOSIS — I10 UNCONTROLLED HYPERTENSION: ICD-10-CM

## 2023-02-23 DIAGNOSIS — G47.09 OTHER INSOMNIA: ICD-10-CM

## 2023-02-23 DIAGNOSIS — N18.31 STAGE 3A CHRONIC KIDNEY DISEASE (HCC): ICD-10-CM

## 2023-02-23 DIAGNOSIS — R42 DIZZINESS AND GIDDINESS: ICD-10-CM

## 2023-02-23 DIAGNOSIS — F41.1 GAD (GENERALIZED ANXIETY DISORDER): ICD-10-CM

## 2023-02-23 DIAGNOSIS — E55.9 VITAMIN D DEFICIENCY: ICD-10-CM

## 2023-02-23 RX ORDER — CLONIDINE 0.1 MG/24H
1 PATCH, EXTENDED RELEASE TRANSDERMAL
Qty: 4 PATCH | Refills: 0 | Status: ON HOLD | OUTPATIENT
Start: 2023-02-23 | End: 2024-02-23

## 2023-02-23 SDOH — ECONOMIC STABILITY: FOOD INSECURITY: WITHIN THE PAST 12 MONTHS, THE FOOD YOU BOUGHT JUST DIDN'T LAST AND YOU DIDN'T HAVE MONEY TO GET MORE.: NEVER TRUE

## 2023-02-23 SDOH — ECONOMIC STABILITY: INCOME INSECURITY: HOW HARD IS IT FOR YOU TO PAY FOR THE VERY BASICS LIKE FOOD, HOUSING, MEDICAL CARE, AND HEATING?: NOT HARD AT ALL

## 2023-02-23 SDOH — ECONOMIC STABILITY: HOUSING INSECURITY
IN THE LAST 12 MONTHS, WAS THERE A TIME WHEN YOU DID NOT HAVE A STEADY PLACE TO SLEEP OR SLEPT IN A SHELTER (INCLUDING NOW)?: NO

## 2023-02-23 SDOH — ECONOMIC STABILITY: FOOD INSECURITY: WITHIN THE PAST 12 MONTHS, YOU WORRIED THAT YOUR FOOD WOULD RUN OUT BEFORE YOU GOT MONEY TO BUY MORE.: NEVER TRUE

## 2023-02-23 ASSESSMENT — ENCOUNTER SYMPTOMS
NAUSEA: 0
STRIDOR: 0
BLOOD IN STOOL: 0
PHOTOPHOBIA: 0
EYE PAIN: 0
COUGH: 0
ABDOMINAL PAIN: 0
SHORTNESS OF BREATH: 0
SORE THROAT: 0
VOMITING: 0
BACK PAIN: 0
EYE REDNESS: 0
CONSTIPATION: 0
EYE DISCHARGE: 0
DIARRHEA: 0

## 2023-02-23 ASSESSMENT — PATIENT HEALTH QUESTIONNAIRE - PHQ9
SUM OF ALL RESPONSES TO PHQ QUESTIONS 1-9: 0
1. LITTLE INTEREST OR PLEASURE IN DOING THINGS: 0
SUM OF ALL RESPONSES TO PHQ QUESTIONS 1-9: 0
2. FEELING DOWN, DEPRESSED OR HOPELESS: 0
SUM OF ALL RESPONSES TO PHQ QUESTIONS 1-9: 0
SUM OF ALL RESPONSES TO PHQ QUESTIONS 1-9: 0
SUM OF ALL RESPONSES TO PHQ9 QUESTIONS 1 & 2: 0

## 2023-02-23 ASSESSMENT — ANXIETY QUESTIONNAIRES
GAD7 TOTAL SCORE: 1
1. FEELING NERVOUS, ANXIOUS, OR ON EDGE: 1
6. BECOMING EASILY ANNOYED OR IRRITABLE: 0
2. NOT BEING ABLE TO STOP OR CONTROL WORRYING: 0
3. WORRYING TOO MUCH ABOUT DIFFERENT THINGS: 0
4. TROUBLE RELAXING: 0
IF YOU CHECKED OFF ANY PROBLEMS ON THIS QUESTIONNAIRE, HOW DIFFICULT HAVE THESE PROBLEMS MADE IT FOR YOU TO DO YOUR WORK, TAKE CARE OF THINGS AT HOME, OR GET ALONG WITH OTHER PEOPLE: SOMEWHAT DIFFICULT
5. BEING SO RESTLESS THAT IT IS HARD TO SIT STILL: 0
7. FEELING AFRAID AS IF SOMETHING AWFUL MIGHT HAPPEN: 0

## 2023-02-23 NOTE — PROGRESS NOTES
Subjective:      Patient ID: Lena Ashby is a 81 y.o. female. Here for follow up of HTN. States she has had side effects with a lot of her BP meds and BP is still up . States currently taking coreg 25 mg BID and lisinopril 20 mg daily . She was started on Hydrallazine when she was at the hospital - but stopped due to metallic taste in the mouth. Later when she saw me I started her on amlodipine instead. The first week she was on it BP was controlled and she had no side effects but the second week after starting amlodipine it was causing swelling in legs and feet . States back of neck is sweating and mouth feels numb and grinding teeth all the time for the past month. States all these sx were there since she was admitted in the hospital- MRI , CT was ok during work up at that time. States she has tingling of feet ,vertigo as well. All sx are going on. Not seen neuro and not seen ENT yet for vertigo- appt is on march 15 th. Taking antivert as needed. States does make her sleepy. She had called multiple times from [facility] - complaining of above sx and was asked to go to the ER for eval but never went as she states she just had all work up done when she was at the hospital.  She will be reffered to Nephrology to see if they can help with her BP control. States gabapentin has been helping with neuropathy and sleep at night - and has been taking this for over 5-6 years- not new for her. Review of Systems   Constitutional:  Negative for chills and fever. HENT:  Negative for congestion, ear pain, hearing loss, nosebleeds, sore throat and tinnitus. Eyes:  Negative for photophobia, pain, discharge and redness. Respiratory:  Negative for cough, shortness of breath and stridor. Cardiovascular:  Negative for chest pain, palpitations and leg swelling. Gastrointestinal:  Negative for abdominal pain, blood in stool, constipation, diarrhea, nausea and vomiting. Endocrine: Negative for polydipsia. Genitourinary:  Negative for dysuria, flank pain, frequency, hematuria and urgency. Musculoskeletal:  Negative for back pain, myalgias and neck pain. Skin:  Negative for rash. Allergic/Immunologic: Negative for environmental allergies. Neurological:  Positive for dizziness. Negative for tremors, seizures, weakness and headaches. Hematological:  Does not bruise/bleed easily. Psychiatric/Behavioral:  Negative for hallucinations and suicidal ideas. The patient is not nervous/anxious. Objective:   Physical Exam  Constitutional:       General: She is not in acute distress. Appearance: She is well-developed. HENT:      Head: Normocephalic and atraumatic. Right Ear: External ear normal.      Left Ear: External ear normal.      Nose: Nose normal.      Mouth/Throat:      Mouth: Mucous membranes are moist.   Eyes:      Conjunctiva/sclera: Conjunctivae normal.      Pupils: Pupils are equal, round, and reactive to light. Cardiovascular:      Rate and Rhythm: Normal rate and regular rhythm. Heart sounds: Normal heart sounds. Pulmonary:      Effort: Pulmonary effort is normal.      Breath sounds: Normal breath sounds. Abdominal:      General: Bowel sounds are normal. There is no distension. Palpations: Abdomen is soft. Tenderness: There is no abdominal tenderness. Musculoskeletal:         General: Normal range of motion. Cervical back: Normal range of motion and neck supple. Skin:     General: Skin is warm and dry. Neurological:      General: No focal deficit present. Mental Status: She is alert and oriented to person, place, and time. Mental status is at baseline. Psychiatric:         Behavior: Behavior normal.         Thought Content: Thought content normal.         Judgment: Judgment normal.        Vitals:    02/23/23 1450   BP: (!) 172/90   Pulse: 69   Temp: 97.1 °F (36.2 °C)   SpO2: 100%         Assessment:      Diagnosis Orders   1.  Essential hypertension 2. Gastroesophageal reflux disease, unspecified whether esophagitis present        3. Vitamin D deficiency        4. SARAH (generalized anxiety disorder)        5. Other insomnia        6. Stage 3a chronic kidney disease ([address]        7. Dizziness and giddiness        8. Dyslipidemia        9. Uncontrolled hypertension              Plan:     Orders Placed This Encounter   Procedures    [facility] ([address]) - Lia Pedro MD, Nephrology, [facility] Encounter Medications as of 2/23/2023   Medication Sig Dispense Refill    carvedilol (COREG) 25 MG tablet Take 1 tablet by mouth 2 times daily 180 tablet 3    meclizine (ANTIVERT) 12.5 MG tablet TAKE 1 TABLET BY MOUTH AT NIGHT  AS NEEDED FOR DIZZINESS 90 tablet 0    lisinopril (PRINIVIL;ZESTRIL) 20 MG tablet TAKE 1 (ONE) TABLET (20 MG TOTAL) BY MOUTH EVERY MORNING . Calcium Carbonate-Vitamin D (OYSTER SHELL CALCIUM/D) 500-5 MG-MCG TABS Take 1 tablet by mouth daily      vitamin D3 (CHOLECALCIFEROL) 10 MCG (400 UNIT) TABS tablet Take by mouth daily      gabapentin (NEURONTIN) 100 MG capsule Take 1 capsule by mouth daily for 90 days.  90 capsule 0    atorvastatin (LIPITOR) 40 MG tablet TAKE 1 TABLET BY MOUTH  DAILY 90 tablet 3    pantoprazole (PROTONIX) 20 MG tablet TAKE 1 TABLET BY MOUTH  DAILY 90 tablet 3    clopidogrel (PLAVIX) 75 MG tablet TAKE 1 TABLET BY MOUTH  DAILY 90 tablet 3    Cyanocobalamin (B-12) 1000 MCG SUBL Place 1 tablet under the tongue daily 90 tablet 5    BABY ASPIRIN PO Take by mouth Indications: take 3 times a week ( she stopped and I have asked she resume as it was recommended by Cardio)       Calcium Carbonate-Vit D-Min (CALCIUM 1200) 5887-0567 MG-UNIT CHEW Take by mouth      [DISCONTINUED] amLODIPine (NORVASC) 5 MG tablet Take 1 tablet by mouth daily 90 tablet 3    [DISCONTINUED] metoprolol succinate (TOPROL XL) 200 MG extended release tablet TAKE 1 TABLET BY MOUTH  DAILY 90 tablet 3    denosumab (PROLIA) 60 MG/ML SOSY SC injection Inject 1 mL into the skin once for 1 dose 1 mL 5     No facility-administered encounter medications on file as of 2/23/2023.      20 minutes spent with patient counseling/educating on acute/chronic medical health problems, medications,  along with treatment options. Reviewed multiple labs/imaging/medications with patient during this time. Following Diet discussion/education was done on Dash Diet. In addition Exercise plan and depression screen were discussed. Recent labs/imaging were discussed and reviewed with patient.

## 2023-02-25 ENCOUNTER — APPOINTMENT (OUTPATIENT)
Dept: GENERAL RADIOLOGY | Age: 83
DRG: 305 | End: 2023-02-25
Payer: MEDICARE

## 2023-02-25 ENCOUNTER — HOSPITAL ENCOUNTER (INPATIENT)
Age: 83
LOS: 3 days | Discharge: HOME | DRG: 305 | End: 2023-02-28
Attending: EMERGENCY MEDICINE | Admitting: FAMILY MEDICINE
Payer: MEDICARE

## 2023-02-25 DIAGNOSIS — I10 UNCONTROLLED HYPERTENSION: ICD-10-CM

## 2023-02-25 DIAGNOSIS — I16.0 HYPERTENSIVE URGENCY: Primary | ICD-10-CM

## 2023-02-25 LAB
ABSOLUTE EOS #: 0.07 K/UL (ref 0–0.44)
ABSOLUTE IMMATURE GRANULOCYTE: 0.01 K/UL (ref 0–0.3)
ABSOLUTE LYMPH #: 0.77 K/UL (ref 1.1–3.7)
ABSOLUTE MONO #: 0.31 K/UL (ref 0.1–1.2)
ANION GAP SERPL CALCULATED.3IONS-SCNC: 9 MMOL/L (ref 9–17)
BASOPHILS # BLD: 1 % (ref 0–2)
BASOPHILS ABSOLUTE: <0.03 K/UL (ref 0–0.2)
BUN SERPL-MCNC: 15 MG/DL (ref 8–23)
BUN/CREAT BLD: 16 (ref 9–20)
CALCIUM SERPL-MCNC: 9.7 MG/DL (ref 8.6–10.4)
CHLORIDE SERPL-SCNC: 103 MMOL/L (ref 98–107)
CO2 SERPL-SCNC: 29 MMOL/L (ref 20–31)
CREAT SERPL-MCNC: 0.95 MG/DL (ref 0.5–0.9)
EOSINOPHILS RELATIVE PERCENT: 2 % (ref 1–4)
GFR SERPL CREATININE-BSD FRML MDRD: >60 ML/MIN/1.73M2
GLUCOSE SERPL-MCNC: 94 MG/DL (ref 70–99)
HCT VFR BLD AUTO: 34.8 % (ref 36.3–47.1)
HGB BLD-MCNC: 10.9 G/DL (ref 11.9–15.1)
IMMATURE GRANULOCYTES: 0 %
LYMPHOCYTES # BLD: 18 % (ref 24–43)
MAGNESIUM SERPL-MCNC: 1.7 MG/DL (ref 1.6–2.6)
MCH RBC QN AUTO: 31.1 PG (ref 25.2–33.5)
MCHC RBC AUTO-ENTMCNC: 31.3 G/DL (ref 28.4–34.8)
MCV RBC AUTO: 99.1 FL (ref 82.6–102.9)
MONOCYTES # BLD: 7 % (ref 3–12)
NRBC AUTOMATED: 0 PER 100 WBC
PDW BLD-RTO: 12.6 % (ref 11.8–14.4)
PLATELET # BLD AUTO: 159 K/UL (ref 138–453)
PMV BLD AUTO: 10.3 FL (ref 8.1–13.5)
POTASSIUM SERPL-SCNC: 4.3 MMOL/L (ref 3.7–5.3)
RBC # BLD: 3.51 M/UL (ref 3.95–5.11)
SEG NEUTROPHILS: 72 % (ref 36–65)
SEGMENTED NEUTROPHILS ABSOLUTE COUNT: 3.01 K/UL (ref 1.5–8.1)
SODIUM SERPL-SCNC: 141 MMOL/L (ref 135–144)
TROPONIN I SERPL DL<=0.01 NG/ML-MCNC: 27 NG/L (ref 0–14)
TROPONIN I SERPL DL<=0.01 NG/ML-MCNC: 28 NG/L (ref 0–14)
WBC # BLD AUTO: 4.2 K/UL (ref 3.5–11.3)

## 2023-02-25 PROCEDURE — 99285 EMERGENCY DEPT VISIT HI MDM: CPT

## 2023-02-25 PROCEDURE — 6370000000 HC RX 637 (ALT 250 FOR IP): Performed by: FAMILY MEDICINE

## 2023-02-25 PROCEDURE — 6370000000 HC RX 637 (ALT 250 FOR IP): Performed by: EMERGENCY MEDICINE

## 2023-02-25 PROCEDURE — 80048 BASIC METABOLIC PNL TOTAL CA: CPT

## 2023-02-25 PROCEDURE — 84484 ASSAY OF TROPONIN QUANT: CPT

## 2023-02-25 PROCEDURE — 85025 COMPLETE CBC W/AUTO DIFF WBC: CPT

## 2023-02-25 PROCEDURE — 71045 X-RAY EXAM CHEST 1 VIEW: CPT

## 2023-02-25 PROCEDURE — 99223 1ST HOSP IP/OBS HIGH 75: CPT | Performed by: FAMILY MEDICINE

## 2023-02-25 PROCEDURE — 93005 ELECTROCARDIOGRAM TRACING: CPT | Performed by: EMERGENCY MEDICINE

## 2023-02-25 PROCEDURE — 1200000000 HC SEMI PRIVATE

## 2023-02-25 PROCEDURE — 83735 ASSAY OF MAGNESIUM: CPT

## 2023-02-25 RX ORDER — ONDANSETRON 4 MG/1
4 TABLET, FILM COATED ORAL EVERY 8 HOURS PRN
Status: DISCONTINUED | OUTPATIENT
Start: 2023-02-25 | End: 2023-02-28 | Stop reason: HOSPADM

## 2023-02-25 RX ORDER — ACETAMINOPHEN 500 MG
500 TABLET ORAL EVERY 6 HOURS PRN
Status: DISCONTINUED | OUTPATIENT
Start: 2023-02-25 | End: 2023-02-28 | Stop reason: HOSPADM

## 2023-02-25 RX ORDER — CLONIDINE 0.2 MG/24H
1 PATCH, EXTENDED RELEASE TRANSDERMAL WEEKLY
Status: DISCONTINUED | OUTPATIENT
Start: 2023-03-02 | End: 2023-02-26

## 2023-02-25 RX ORDER — PANTOPRAZOLE SODIUM 20 MG/1
20 TABLET, DELAYED RELEASE ORAL
Status: DISCONTINUED | OUTPATIENT
Start: 2023-02-26 | End: 2023-02-28 | Stop reason: HOSPADM

## 2023-02-25 RX ORDER — GABAPENTIN 100 MG/1
100 CAPSULE ORAL NIGHTLY
Status: DISCONTINUED | OUTPATIENT
Start: 2023-02-25 | End: 2023-02-28 | Stop reason: HOSPADM

## 2023-02-25 RX ORDER — LISINOPRIL 10 MG/1
40 TABLET ORAL DAILY
Status: DISCONTINUED | OUTPATIENT
Start: 2023-02-25 | End: 2023-02-25

## 2023-02-25 RX ORDER — ACETAMINOPHEN 500 MG
1000 TABLET ORAL ONCE
Status: COMPLETED | OUTPATIENT
Start: 2023-02-25 | End: 2023-02-25

## 2023-02-25 RX ORDER — ATORVASTATIN CALCIUM 40 MG/1
40 TABLET, FILM COATED ORAL DAILY
Status: DISCONTINUED | OUTPATIENT
Start: 2023-02-25 | End: 2023-02-28 | Stop reason: HOSPADM

## 2023-02-25 RX ORDER — NIFEDIPINE 30 MG/1
30 TABLET, EXTENDED RELEASE ORAL DAILY
Status: DISCONTINUED | OUTPATIENT
Start: 2023-02-25 | End: 2023-02-28 | Stop reason: HOSPADM

## 2023-02-25 RX ORDER — LISINOPRIL 10 MG/1
10 TABLET ORAL DAILY
Status: DISCONTINUED | OUTPATIENT
Start: 2023-02-25 | End: 2023-02-25

## 2023-02-25 RX ORDER — LISINOPRIL 40 MG/1
40 TABLET ORAL DAILY
Status: DISCONTINUED | OUTPATIENT
Start: 2023-02-25 | End: 2023-02-27

## 2023-02-25 RX ORDER — HYDRALAZINE HYDROCHLORIDE 20 MG/ML
20 INJECTION INTRAMUSCULAR; INTRAVENOUS EVERY 6 HOURS PRN
Status: DISCONTINUED | OUTPATIENT
Start: 2023-02-25 | End: 2023-02-27

## 2023-02-25 RX ORDER — MECLIZINE HCL 12.5 MG/1
12.5 TABLET ORAL 3 TIMES DAILY PRN
Status: DISCONTINUED | OUTPATIENT
Start: 2023-02-25 | End: 2023-02-26

## 2023-02-25 RX ORDER — CARVEDILOL 25 MG/1
25 TABLET ORAL 2 TIMES DAILY
Status: DISCONTINUED | OUTPATIENT
Start: 2023-02-25 | End: 2023-02-28 | Stop reason: HOSPADM

## 2023-02-25 RX ORDER — CLONIDINE 0.2 MG/24H
1 PATCH, EXTENDED RELEASE TRANSDERMAL WEEKLY
Status: DISCONTINUED | OUTPATIENT
Start: 2023-02-25 | End: 2023-02-25

## 2023-02-25 RX ORDER — CLOPIDOGREL BISULFATE 75 MG/1
75 TABLET ORAL DAILY
Status: DISCONTINUED | OUTPATIENT
Start: 2023-02-25 | End: 2023-02-28 | Stop reason: HOSPADM

## 2023-02-25 RX ORDER — ZOLPIDEM TARTRATE 5 MG/1
5 TABLET ORAL NIGHTLY PRN
Status: DISCONTINUED | OUTPATIENT
Start: 2023-02-25 | End: 2023-02-28 | Stop reason: HOSPADM

## 2023-02-25 RX ADMIN — ACETAMINOPHEN 1000 MG: 500 TABLET, FILM COATED ORAL at 14:06

## 2023-02-25 RX ADMIN — ATORVASTATIN CALCIUM 40 MG: 40 TABLET, FILM COATED ORAL at 20:22

## 2023-02-25 RX ADMIN — GABAPENTIN 100 MG: 100 CAPSULE ORAL at 20:18

## 2023-02-25 RX ADMIN — CARVEDILOL 25 MG: 25 TABLET, FILM COATED ORAL at 20:18

## 2023-02-25 RX ADMIN — LISINOPRIL 10 MG: 10 TABLET ORAL at 12:21

## 2023-02-25 RX ADMIN — ZOLPIDEM TARTRATE 5 MG: 5 TABLET ORAL at 20:18

## 2023-02-25 RX ADMIN — ACETAMINOPHEN 500 MG: 500 TABLET ORAL at 20:18

## 2023-02-25 RX ADMIN — NIFEDIPINE 30 MG: 30 TABLET, FILM COATED, EXTENDED RELEASE ORAL at 16:24

## 2023-02-25 ASSESSMENT — ENCOUNTER SYMPTOMS
VOMITING: 0
DIARRHEA: 0
RHINORRHEA: 0
SORE THROAT: 0
COLOR CHANGE: 0
PHOTOPHOBIA: 0
EYE PAIN: 0
SHORTNESS OF BREATH: 0
EYE REDNESS: 0
CONSTIPATION: 0
ABDOMINAL PAIN: 0
EYE DISCHARGE: 0
COUGH: 0
NAUSEA: 0
BLOOD IN STOOL: 0
STRIDOR: 0
BACK PAIN: 0

## 2023-02-25 ASSESSMENT — PAIN DESCRIPTION - DESCRIPTORS: DESCRIPTORS: ACHING

## 2023-02-25 ASSESSMENT — PAIN SCALES - GENERAL
PAINLEVEL_OUTOF10: 6
PAINLEVEL_OUTOF10: 5

## 2023-02-25 ASSESSMENT — PAIN DESCRIPTION - LOCATION
LOCATION: HEAD
LOCATION: HEAD

## 2023-02-25 NOTE — H&P
HISTORY AND PHYSICAL             Date: 2/25/2023        Patient Name: Nash Oviedo     YOB: 1940      Age:  81 y.o. Chief Complaint     Chief Complaint   Patient presents with    Hypertension     Pt states her BP is high and has seen PCP for this on Thurs. Uncontrolled HTN associated with dizziness and left arm tingling    History Obtained From   patient, electronic medical record, ED physician    History of Present Illness   81year old  female with uncontrolled HTN for the past several weeks as well as symptoms of Dizziness, tingling of left arm presented to the ED as advised due to these problems. She has seen me a couple of times in the office. Was recently evaluated in ED of [facility] on Jan 9 th with similar sx and BP was finally controlled with IV labetalol as well as Hydrallazine. She saw me for a follow up next week stating she was intolerant to the hydrallazine - so she had stopped it and BP was back up again and she had continued with the left arm tingling,dizziness and headaches. I placed her  on Amlodipine which she tolerated for about a week but then she returned in 2 weeks with Sx of leg swelling and feeling poorly. Amlodipine was stopped and she was asked to go up on the lisinopril from 20 mg to 30 mg as well as lopressor switched to coreg highest dose. She continued to have High BP - was [facility] and called the office many times with varying sx -advised to go to ER for eval but did not stating she had had all tests including MRI brain as well as CT done on initial ED eval at [address] [place] [address]. She was seen in my office last Thursday again because of continued BP elevation and sx and I started her on clonidine but she did not respond but finally decided to come to the ER for evaluation. In ED her BP remained high and it was decided to admit her with nephrology consult for further eval and management.         Past Medical History     Past Medical History: Diagnosis Date    Anemia 6/9/2020    Cervical radiculopathy 6/9/2020    Dizziness and giddiness 1/6/2023    Dyslipidemia 8/15/2019    Early menopause 9/8/2020    Ex-smoker for more than 1 year 8/15/2019    Smoked for 15 years and Quit Over 30 years ago up to 1 PPD    History of arterial ischemic stroke 8/15/2019    History of bleeding peptic ulcer 8/15/2019    History of cerebrovascular accident 8/15/2019    Hypertension     Hypertensive urgency 1/8/2023    Intestinal metaplasia of gastric mucosa 6/9/2020    Narrowing of intervertebral disc space 12/16/2020    Osteopenia 6/9/2020    Osteopenia 6/9/2020    Other specified abnormalities of plasma proteins 1/6/2023        Past Surgical History     Past Surgical History:   Procedure Laterality Date    APPENDECTOMY      CHOLECYSTECTOMY      HYSTERECTOMY, TOTAL ABDOMINAL (CERVIX REMOVED)      TONSILLECTOMY          Medications Prior to Admission     Prior to Admission medications    Medication Sig Start Date End Date Taking? Authorizing Provider   cloNIDine (CATAPRES-TTS-1) 0.1 MG/24HR PTWK Place 1 patch onto the skin every 7 days 2/23/23 2/23/24  Luh Moss MD   carvedilol (COREG) 25 MG tablet Take 1 tablet by mouth 2 times daily 2/14/23   Luh Moss MD   meclizine (ANTIVERT) 12.5 MG tablet TAKE 1 TABLET BY MOUTH AT NIGHT  AS NEEDED FOR DIZZINESS 1/20/23   Luh Moss MD   lisinopril (PRINIVIL;ZESTRIL) 20 MG tablet TAKE 1 (ONE) TABLET (20 MG TOTAL) BY MOUTH EVERY MORNING . 1/9/23   Historical Provider, MD   Calcium Carbonate-Vitamin D (OYSTER SHELL CALCIUM/D) 500-5 MG-MCG TABS Take 1 tablet by mouth daily    Historical Provider, MD   vitamin D3 (CHOLECALCIFEROL) 10 MCG (400 UNIT) TABS tablet Take by mouth daily    Historical Provider, MD   gabapentin (NEURONTIN) 100 MG capsule Take 1 capsule by mouth daily for 90 days.  1/16/23 4/16/23  Luh Moss MD   atorvastatin (LIPITOR) 40 MG tablet TAKE 1 TABLET BY MOUTH  DAILY 6/7/22   Luh Moss MD pantoprazole (PROTONIX) 20 MG tablet TAKE 1 TABLET BY MOUTH  DAILY 2/14/22   Ricky Velásquez MD   clopidogrel (PLAVIX) 75 MG tablet TAKE 1 TABLET BY MOUTH  DAILY 7/20/21   Ricky Velásquez MD   denosumab (PROLIA) 60 MG/ML SOSY SC injection Inject 1 mL into the skin once for 1 dose 7/15/21 7/15/21  Ricky Velásquez MD   Cyanocobalamin (B-12) 1000 MCG SUBL Place 1 tablet under the tongue daily 9/12/19   Ricky Velásquez MD   BABY ASPIRIN PO Take by mouth Indications: take 3 times a week ( she stopped and I have asked she resume as it was recommended by Cardio)     Historical Provider, MD   Calcium Carbonate-Vit D-Min (CALCIUM 1200) 9851-2590 MG-UNIT CHEW Take by mouth    Historical Provider, MD        Allergies   Amlodipine, Clarithromycin, Effexor [venlafaxine], Hydralazine, Levaquin  [levofloxacin in d5w], and Levofloxacin    Social History     Social History       Tobacco History       Smoking Status  Former Quit Date  8/15/1989 Smoking Frequency  1 pack/day for 15.00 years (15.00 pk-yrs) Smoking Tobacco Type  Cigarettes quit in 8/15/1989      Smokeless Tobacco Use  Never              Alcohol History       Alcohol Use Status  Yes Comment  every now and then               Drug Use       Drug Use Status  Never              Sexual Activity       Sexually Active  Not Asked                    Family History     Family History   Problem Relation Age of Onset    Breast Cancer Mother     Breast Cancer Niece     Breast Cancer Maternal Aunt     Breast Cancer Maternal Aunt        Review of Systems   Review of Systems   Constitutional:  Positive for activity change and fatigue. Negative for chills and fever. HENT:  Negative for congestion, ear pain, hearing loss, nosebleeds, sore throat and tinnitus. Eyes:  Negative for photophobia, pain, discharge and redness. Respiratory:  Negative for cough, shortness of breath and stridor. Cardiovascular:  Negative for chest pain, palpitations and leg swelling.    Gastrointestinal: Negative for abdominal pain, blood in stool, constipation, diarrhea, nausea and vomiting. Endocrine: Negative for polydipsia. Genitourinary:  Negative for dysuria, flank pain, frequency, hematuria and urgency. Musculoskeletal:  Negative for back pain, myalgias and neck pain. Skin:  Negative for rash. Allergic/Immunologic: Negative for environmental allergies. Neurological:  Positive for dizziness, numbness and headaches. Negative for tremors, seizures and weakness. Tingling to left arm   Hematological:  Does not bruise/bleed easily. Psychiatric/Behavioral:  Negative for hallucinations and suicidal ideas. The patient is not nervous/anxious. Physical Exam   BP (!) 195/71   Pulse 66   Temp 97.6 °F (36.4 °C) (Oral)   Resp 18   Ht 5' (1.524 m)   Wt 110 lb (49.9 kg)   SpO2 97%   BMI 21.48 kg/m²     Physical Exam  Constitutional:       General: She is not in acute distress. Appearance: She is well-developed. HENT:      Head: Normocephalic and atraumatic. Right Ear: External ear normal.      Left Ear: External ear normal.      Nose: Nose normal.      Mouth/Throat:      Mouth: Mucous membranes are moist.      Pharynx: No oropharyngeal exudate. Eyes:      General: No scleral icterus. Right eye: No discharge. Left eye: No discharge. Conjunctiva/sclera: Conjunctivae normal.      Pupils: Pupils are equal, round, and reactive to light. Neck:      Thyroid: No thyromegaly. Vascular: No JVD. Trachea: No tracheal deviation. Cardiovascular:      Rate and Rhythm: Normal rate and regular rhythm. Heart sounds: Normal heart sounds. No murmur heard. No friction rub. No gallop. Pulmonary:      Effort: Pulmonary effort is normal. No respiratory distress. Breath sounds: Normal breath sounds. No stridor. No wheezing or rales. Chest:      Chest wall: No tenderness. Abdominal:      General: Bowel sounds are normal. There is no distension. Palpations: Abdomen is soft. There is no mass. Tenderness: There is no abdominal tenderness. There is no guarding or rebound. Musculoskeletal:         General: No tenderness or deformity. Normal range of motion. Cervical back: Normal range of motion and neck supple. Lymphadenopathy:      Cervical: No cervical adenopathy. Skin:     General: Skin is warm and dry. Findings: No erythema or rash. Neurological:      General: No focal deficit present. Mental Status: She is alert and oriented to person, place, and time. Mental status is at baseline. Cranial Nerves: No cranial nerve deficit. Motor: No abnormal muscle tone. Coordination: Coordination normal.      Deep Tendon Reflexes: Reflexes are normal and symmetric. Reflexes normal.   Psychiatric:         Mood and Affect: Mood normal.         Behavior: Behavior normal.         Thought Content:  Thought content normal.         Judgment: Judgment normal.     Labs      Recent Results (from the past 24 hour(s))   EKG 12 Lead    Collection Time: 02/25/23 10:10 AM   Result Value Ref Range    Ventricular Rate 58 BPM    Atrial Rate 58 BPM    P-R Interval 138 ms    QRS Duration 88 ms    Q-T Interval 432 ms    QTc Calculation (Bazett) 424 ms    P Axis 51 degrees    R Axis -9 degrees    T Axis 39 degrees   Basic Metabolic Panel    Collection Time: 02/25/23 10:21 AM   Result Value Ref Range    Glucose 94 70 - 99 mg/dL    BUN 15 8 - 23 mg/dL    Creatinine 0.95 (H) 0.50 - 0.90 mg/dL    Est, Glom Filt Rate >60 >60 mL/min/1.73m2    Bun/Cre Ratio 16 9 - 20    Calcium 9.7 8.6 - 10.4 mg/dL    Sodium 141 135 - 144 mmol/L    Potassium 4.3 3.7 - 5.3 mmol/L    Chloride 103 98 - 107 mmol/L    CO2 29 20 - 31 mmol/L    Anion Gap 9 9 - 17 mmol/L   CBC with Auto Differential    Collection Time: 02/25/23 10:21 AM   Result Value Ref Range    WBC 4.2 3.5 - 11.3 k/uL    RBC 3.51 (L) 3.95 - 5.11 m/uL    Hemoglobin 10.9 (L) 11.9 - 15.1 g/dL    Hematocrit 34.8 (L) 36.3 - 47.1 %    MCV 99.1 82.6 - 102.9 fL    MCH 31.1 25.2 - 33.5 pg    MCHC 31.3 28.4 - 34.8 g/dL    RDW 12.6 11.8 - 14.4 %    Platelets 447 527 - 482 k/uL    MPV 10.3 8.1 - 13.5 fL    NRBC Automated 0.0 0.0 per 100 WBC    Seg Neutrophils 72 (H) 36 - 65 %    Lymphocytes 18 (L) 24 - 43 %    Monocytes 7 3 - 12 %    Eosinophils % 2 1 - 4 %    Basophils 1 0 - 2 %    Immature Granulocytes 0 0 %    Segs Absolute 3.01 1.50 - 8.10 k/uL    Absolute Lymph # 0.77 (L) 1.10 - 3.70 k/uL    Absolute Mono # 0.31 0.10 - 1.20 k/uL    Absolute Eos # 0.07 0.00 - 0.44 k/uL    Basophils Absolute <0.03 0.00 - 0.20 k/uL    Absolute Immature Granulocyte 0.01 0.00 - 0.30 k/uL   Magnesium    Collection Time: 02/25/23 10:21 AM   Result Value Ref Range    Magnesium 1.7 1.6 - 2.6 mg/dL   Troponin    Collection Time: 02/25/23 10:21 AM   Result Value Ref Range    Troponin, High Sensitivity 27 (H) 0 - 14 ng/L   Troponin    Collection Time: 02/25/23 12:25 PM   Result Value Ref Range    Troponin, High Sensitivity 28 (H) 0 - 14 ng/L        Imaging/Diagnostics Last 24 Hours   XR CHEST PORTABLE    Result Date: 2/25/2023  EXAMINATION: ONE XRAY VIEW OF THE CHEST 2/25/2023 9:53 am COMPARISON: Chest x-ray from 10/10/2021 HISTORY: ORDERING SYSTEM PROVIDED HISTORY: Chest Pain TECHNOLOGIST PROVIDED HISTORY: Chest Pain Reason for Exam: Pt eval for f/u SOB FINDINGS: The cardiac silhouette is borderline in size. No pleural effusion or pneumothorax. Mild interstitial prominence at the lung bases favoring mild atelectasis, less likely pneumonia. Pulmonary hyperinflation is suspicious for COPD. Mild bibasilar infiltrates or atelectasis. Correlate clinically to exclude pneumonia with consideration for imaging follow-up. Findings associated with COPD.        Assessment      Hospital Problems             Last Modified POA    * (Principal) Uncontrolled hypertension 2/25/2023 Yes       Plan     Orders Placed This Encounter   Procedures    XR CHEST PORTABLE Basic Metabolic Panel    CBC with Auto Differential    Magnesium    Troponin    Continuous Pulse Oximetry    Call Doctor    Inpatient consult to Hospitalist    Inpatient consult to Nephrology    EKG 12 Lead    Insert peripheral IV    ADMIT TO INPATIENT       [unfilled]     Consultations Ordered:  IP CONSULT TO HOSPITALIST  IP CONSULT TO NEPHROLOGY  IP CONSULT TO NEPHROLOGY    Electronically signed by Moise Valderrama MD on 2/25/23 at 2:19 PM EST

## 2023-02-25 NOTE — ED PROVIDER NOTES
EMERGENCY DEPARTMENT ENCOUNTER    Pt Name: Abel Miranda  MRN: [id number]  [place] 1940  Date of evaluation: 2/25/23  CHIEF COMPLAINT       Chief Complaint   Patient presents with    Hypertension     Pt states her BP is high and has seen PCP for this on Thurs. HISTORY OF PRESENT ILLNESS   This is an 81-year-old female that presents with complaints of hypertension. The patient was recently admitted to hospital near [facility] for hypertensive urgency. She been having some issues with intermittent dizziness some numbness and tingling in her mouth, MRI at that time was unremarkable, they were trying to get her blood pressure under control. The patient presents today with similar symptoms, she saw her PCP a few days ago and they started her on a clonidine patch and increased her lisinopril. REVIEW OF SYSTEMS     Review of Systems   Constitutional:  Negative for chills and fever. HENT:  Negative for rhinorrhea and sore throat. Eyes:  Negative for discharge, redness and visual disturbance. Respiratory:  Negative for cough and shortness of breath. Cardiovascular:  Negative for chest pain, palpitations and leg swelling. Gastrointestinal:  Negative for diarrhea, nausea and vomiting. Musculoskeletal:  Negative for arthralgias, myalgias and neck pain. Skin:  Negative for color change and rash. Neurological:  Negative for seizures, weakness and headaches. Psychiatric/Behavioral:  Negative for hallucinations, self-injury and suicidal ideas.     PASTMEDICAL HISTORY     Past Medical History:   Diagnosis Date    Anemia 6/9/2020    Cervical radiculopathy 6/9/2020    Dizziness and giddiness 1/6/2023    Dyslipidemia 8/15/2019    Early menopause 9/8/2020    Ex-smoker for more than 1 year 8/15/2019    Smoked for 15 years and Quit Over 30 years ago up to 1 PPD    History of arterial ischemic stroke 8/15/2019    History of bleeding peptic ulcer 8/15/2019    History of cerebrovascular accident 8/15/2019    Hypertension     Hypertensive urgency 1/8/2023    Intestinal metaplasia of gastric mucosa 6/9/2020    Narrowing of intervertebral disc space 12/16/2020    Osteopenia 6/9/2020    Osteopenia 6/9/2020    Other specified abnormalities of plasma proteins 1/6/2023     Past Problem List  Patient Active Problem List   Diagnosis Code    Dyslipidemia E78.5    Neuropathy G62.9    B12 deficiency E53.8    Vitamin D deficiency E55.9    Gaytan's esophagus without dysplasia K22.70    Gastroesophageal reflux disease K21.9    Essential hypertension I10    Age-related osteoporosis without current pathological fracture M81.0    SARAH (generalized anxiety disorder) F41.1    Other insomnia G47.09    Stage 3a chronic kidney disease (HCC) N18.31    Sinus bradycardia R00.1    Radiculopathy, lumbar region M54.16    Dizziness and giddiness R42    Uncontrolled hypertension I10     SURGICAL HISTORY       Past Surgical History:   Procedure Laterality Date    APPENDECTOMY      CHOLECYSTECTOMY      HYSTERECTOMY, TOTAL ABDOMINAL (CERVIX REMOVED)      TONSILLECTOMY       CURRENT MEDICATIONS       Current Discharge Medication List        CONTINUE these medications which have NOT CHANGED    Details   cloNIDine (CATAPRES-TTS-1) 0.1 MG/24HR PTWK Place 1 patch onto the skin every 7 days  Qty: 4 patch, Refills: 0      carvedilol (COREG) 25 MG tablet Take 1 tablet by mouth 2 times daily  Qty: 180 tablet, Refills: 3    Associated Diagnoses: Essential hypertension      meclizine (ANTIVERT) 12.5 MG tablet TAKE 1 TABLET BY MOUTH AT NIGHT  AS NEEDED FOR DIZZINESS  Qty: 90 tablet, Refills: 0    Associated Diagnoses: Vertigo      lisinopril (PRINIVIL;ZESTRIL) 20 MG tablet TAKE 1 (ONE) TABLET (20 MG TOTAL) BY MOUTH EVERY MORNING .       Calcium Carbonate-Vitamin D (OYSTER SHELL CALCIUM/D) 500-5 MG-MCG TABS Take 1 tablet by mouth daily      vitamin D3 (CHOLECALCIFEROL) 10 MCG (400 UNIT) TABS tablet Take by mouth daily      gabapentin (NEURONTIN) 100 MG capsule Take 1 capsule by mouth daily for 90 days. Qty: 90 capsule, Refills: 0    Associated Diagnoses: Neuropathy      atorvastatin (LIPITOR) 40 MG tablet TAKE 1 TABLET BY MOUTH  DAILY  Qty: 90 tablet, Refills: 3    Comments: Requesting 1 year supply  Associated Diagnoses: Dyslipidemia      pantoprazole (PROTONIX) 20 MG tablet TAKE 1 TABLET BY MOUTH  DAILY  Qty: 90 tablet, Refills: 3    Comments: Requesting 1 year supply  Associated Diagnoses: Gastroesophageal reflux disease, unspecified whether esophagitis present; Gaytan's esophagus with dysplasia      clopidogrel (PLAVIX) 75 MG tablet TAKE 1 TABLET BY MOUTH  DAILY  Qty: 90 tablet, Refills: 3    Comments: Requesting 1 year supply  Associated Diagnoses: History of arterial ischemic stroke      denosumab (PROLIA) 60 MG/ML SOSY SC injection Inject 1 mL into the skin once for 1 dose  Qty: 1 mL, Refills: 5    Associated Diagnoses: Senile osteoporosis      Cyanocobalamin (B-12) 1000 MCG SUBL Place 1 tablet under the tongue daily  Qty: 90 tablet, Refills: 5    Associated Diagnoses: B12 deficiency      BABY ASPIRIN PO Take by mouth Indications: take 3 times a week ( she stopped and I have asked she resume as it was recommended by Cardio)       Calcium Carbonate-Vit D-Min (CALCIUM 1200) 0058-4153 MG-UNIT CHEW Take by mouth           ALLERGIES     is allergic to amlodipine, clarithromycin, effexor [venlafaxine], hydralazine, levaquin  [levofloxacin in d5w], and levofloxacin. FAMILY HISTORY     She indicated that her mother is . She indicated that her father is . She indicated that the status of her niece is unknown.      SOCIAL HISTORY       Social History     Tobacco Use    Smoking status: Former     Packs/day: 1.00     Years: 15.00     Pack years: 15.00     Types: Cigarettes     Quit date: 8/15/1989     Years since quittin.5    Smokeless tobacco: Never   Substance Use Topics    Alcohol use: Yes     Comment: every now and then     Drug use: Never PHYSICAL EXAM     INITIAL VITALS: BP (!) 177/76   Pulse 68   Temp 98.1 °F (36.7 °C) (Oral)   Resp 16   Ht 5' (1.524 m)   Wt 110 lb (49.9 kg)   SpO2 97%   BMI 21.48 kg/m²    Physical Exam  Constitutional:       Appearance: Normal appearance. She is well-developed. She is not ill-appearing or toxic-appearing. HENT:      Head: Normocephalic and atraumatic. Eyes:      Conjunctiva/sclera: Conjunctivae normal.      Pupils: Pupils are equal, round, and reactive to light. Neck:      Trachea: Trachea normal.   Cardiovascular:      Rate and Rhythm: Normal rate and regular rhythm. Heart sounds: S1 normal and S2 normal. No murmur heard. Pulmonary:      Effort: Pulmonary effort is normal. No accessory muscle usage or respiratory distress. Breath sounds: Normal breath sounds. Chest:      Chest wall: No deformity or tenderness. Abdominal:      General: Bowel sounds are normal. There is no distension or abdominal bruit. Palpations: Abdomen is not rigid. Tenderness: There is no abdominal tenderness. There is no guarding or rebound. Musculoskeletal:      Cervical back: Normal range of motion and neck supple. Skin:     General: Skin is warm. Findings: No rash. Neurological:      Mental Status: She is alert and oriented to person, place, and time. GCS: GCS eye subscore is 4. GCS verbal subscore is 5. GCS motor subscore is 6. Cranial Nerves: Cranial nerves 2-12 are intact. Sensory: Sensation is intact. Motor: Motor function is intact. Coordination: Coordination is intact. Psychiatric:         Speech: Speech normal.       MEDICAL DECISION MAKING / ED COURSE:   Summary of Patient Presentation:    81-year-old female presents with complaints of hypertensive urgency, plan is basic labs reevaluation and discussion with her PCP.     1)  Number and Complexity of Problems  Problem List This Visit: Pretension    Differential Diagnosis: Hypertensive urgency, hypertensive emergency, stroke, acute coronary syndrome.    Diagnoses Considered but Do Not Suspect:      Pertinent Comorbid Conditions:  Hypertension     2)  Data Reviewed  My EKG interpretation: Patient's EKG shows sinus bradycardia with a rate of 58 WV QRS QTc intervals unremarkable patient has left axis deviation no ST elevations or depressions, nonspecific EKG.      Imaging that is independently reviewed and interpreted by me as:      See more data below for the lab and radiology tests and orders.    3)  Treatment and Disposition  Patient's case was discussed with her primary care provider, she recommended increasing her lisinopril and monitoring the patient in the ER for few hours.    4:02 PM EST  Patient's blood pressure did not improve, she was discussed with the hospitalist service as well as the nephrology service, she will be admitted for further care.    Shared Decision Making: The patient was involved in his/her plan of care through shared decision making. The testing that was ordered was discussed with the patient. Any medications that may have been ordered were discussed with the patient    Code Status Discussion:      \"ED Course\" Notes From Epic Narrator:         CRITICAL CARE:       PROCEDURES:    Procedures      DATA FOR LAB AND RADIOLOGY TESTS ORDERED BELOW ARE REVIEWED BY THE ED CLINICIAN:    RADIOLOGY: All x-rays, CT, MRI, and formal ultrasound images (except ED bedside ultrasound) are read by the radiologist, see reports below, unless otherwise noted in MDM or here.  Reports below are reviewed by myself.  XR CHEST PORTABLE   Final Result   Mild bibasilar infiltrates or atelectasis.  Correlate clinically to exclude   pneumonia with consideration for imaging follow-up.      Findings associated with COPD.             LABS: Lab orders shown below, the results are reviewed by myself, and all abnormals are listed below.  Labs Reviewed   BASIC METABOLIC PANEL - Abnormal; Notable for the following  components:       Result Value    Creatinine 0.95 (*)     All other components within normal limits   CBC WITH AUTO DIFFERENTIAL - Abnormal; Notable for the following components:    RBC 3.51 (*)     Hemoglobin 10.9 (*)     Hematocrit 34.8 (*)     Seg Neutrophils 72 (*)     Lymphocytes 18 (*)     Absolute Lymph # 0.77 (*)     All other components within normal limits   TROPONIN - Abnormal; Notable for the following components:    Troponin, High Sensitivity 27 (*)     All other components within normal limits   TROPONIN - Abnormal; Notable for the following components:    Troponin, High Sensitivity 28 (*)     All other components within normal limits   MAGNESIUM       Vitals Reviewed:    Vitals:    02/25/23 1356 02/25/23 1411 02/25/23 1426 02/25/23 1518   BP: (!) 185/82 (!) 176/84 (!) 188/77 (!) 177/76   Pulse:    68   Resp:    16   Temp:    98.1 °F (36.7 °C)   TempSrc:    Oral   SpO2: 98% 97% 98% 97%   Weight:       Height:         MEDICATIONS GIVEN TO PATIENT THIS ENCOUNTER:  Orders Placed This Encounter   Medications    DISCONTD: lisinopril (PRINIVIL;ZESTRIL) tablet 40 mg    DISCONTD: lisinopril (PRINIVIL;ZESTRIL) tablet 10 mg    acetaminophen (TYLENOL) tablet 1,000 mg    atorvastatin (LIPITOR) tablet 40 mg    carvedilol (COREG) tablet 25 mg    cloNIDine (CATAPRES) 0.2 MG/24HR 1 patch    NIFEdipine (PROCARDIA XL) extended release tablet 30 mg    clopidogrel (PLAVIX) tablet 75 mg    gabapentin (NEURONTIN) capsule 100 mg    meclizine (ANTIVERT) tablet 12.5 mg    pantoprazole (PROTONIX) tablet 40 mg    lisinopril (PRINIVIL;ZESTRIL) tablet 40 mg    acetaminophen (TYLENOL) tablet 500 mg    ondansetron (ZOFRAN) tablet 4 mg    zolpidem (AMBIEN) tablet 5 mg       DISCHARGE PRESCRIPTIONS:  Current Discharge Medication List        PHYSICIAN CONSULTS ORDERED THIS ENCOUNTER:  IP CONSULT TO HOSPITALIST  IP CONSULT TO NEPHROLOGY  IP CONSULT TO NEPHROLOGY  FINAL IMPRESSION      1.  Hypertensive urgency          DISPOSITION/PLAN DISPOSITION Admitted 02/25/2023 02:19:45 PM      OUTPATIENT FOLLOW UP THE PATIENT:  No follow-up provider specified.     MD Ezekiel Mayberry MD  02/25/23 2613

## 2023-02-25 NOTE — ED NOTES
ED to inpatient nurses report     Chief Complaint   Patient presents with    Hypertension     Pt states her BP is high and has seen PCP for this on Thurs. Present to ED from home due to elevated BP. LOC: alert and orientated to name, place, date  Vital signs   Vitals:    02/25/23 1341 02/25/23 1356 02/25/23 1411 02/25/23 1426   BP: (!) 184/78 (!) 185/82 (!) 176/84 (!) 188/77   Pulse:       Resp:       Temp:       TempSrc:       SpO2: 97% 98% 97% 98%   Weight:       Height:          Oxygen Baseline room air    Current needs required none  LDAs:   Peripheral IV 02/25/23 Left Antecubital (Active)   Site Assessment Clean, dry & intact 02/25/23 1027   Line Status Blood return noted; Flushed 02/25/23 1027   Phlebitis Assessment No symptoms 02/25/23 1027   Infiltration Assessment 0 02/25/23 1027   Dressing Status New dressing applied 02/25/23 1027   Dressing Type Transparent 02/25/23 1027   Dressing Intervention New 02/25/23 1027     Mobility: Independent  Fall Risk:   no                                                                                                                                                        Pending ED orders: none  Present condition: good  Code Status: full  Consults: IP CONSULT TO HOSPITALIST  IP CONSULT TO NEPHROLOGY  IP CONSULT TO NEPHROLOGY  [x]  Hospitalist  Completed  [x] yes [] no Who:   []  Medicine  Completed  [] yes [] No Who:   []  Cardiology  Completed  [] yes [] No Who:   []  GI   Completed  [] yes [] No Who:   []  Neurology  Completed  [] yes [] No Who:   [x]  Nephrology Completed  [x] yes [] No Who:    []  Vascular  Completed  [] yes [] No Who:   []  Ortho  Completed  [] yes [] No Who:     []  Surgery  Completed  [] yes [] No Who:    []  Urology  Completed  [] yes [] No Who:    []  CT Surgery Completed  [] yes [] No Who:   []  Podiatry  Completed  [] yes [] No Who:    []  Other    Completed  [] yes [] No Who:  Interventions: labs, Fluids, tylenol,   Important Events: Pt saw Dr. Darcy Gooden this past Thursday and was placed on clonidine patch 0.1 with no change. Pt is here to figure out her meds. Dr. Darcy Gooden saw pt in ED.        Electronically signed by Tyler Cuevas RN on 2/25/2023 at 2:45 PM       Leonarda Oro RN  02/25/23 0563

## 2023-02-25 NOTE — ED NOTES
Pt stated she saw her doctor Thursday for high blood pressure, pt given TD clonidine patch and to monitor BP. Pt states her blood pressure at home has been 180/90's, reports feeling dizzy and having tingling down her left arm.      Mansi Manzano RN  02/25/23 [id number]

## 2023-02-26 PROBLEM — R42 VERTIGO: Status: ACTIVE | Noted: 2023-02-26

## 2023-02-26 LAB
ANION GAP SERPL CALCULATED.3IONS-SCNC: 9 MMOL/L (ref 9–17)
BACTERIA: ABNORMAL
BILIRUBIN URINE: NEGATIVE
BUN SERPL-MCNC: 18 MG/DL (ref 8–23)
BUN/CREAT BLD: 18 (ref 9–20)
CALCIUM SERPL-MCNC: 9.2 MG/DL (ref 8.6–10.4)
CHLORIDE SERPL-SCNC: 102 MMOL/L (ref 98–107)
CO2 SERPL-SCNC: 28 MMOL/L (ref 20–31)
COLOR: YELLOW
COMPLEMENT C3: 121 MG/DL (ref 90–180)
COMPLEMENT C4: 25 MG/DL (ref 10–40)
CREAT SERPL-MCNC: 0.99 MG/DL (ref 0.5–0.9)
CREATININE URINE: 200.5 MG/DL (ref 28–217)
EKG ATRIAL RATE: 58 BPM
EKG P AXIS: 51 DEGREES
EKG P-R INTERVAL: 138 MS
EKG Q-T INTERVAL: 432 MS
EKG QRS DURATION: 88 MS
EKG QTC CALCULATION (BAZETT): 424 MS
EKG R AXIS: -9 DEGREES
EKG T AXIS: 39 DEGREES
EKG VENTRICULAR RATE: 58 BPM
EPITHELIAL CELLS UA: ABNORMAL /HPF (ref 0–5)
FREE KAPPA/LAMBDA RATIO: 1.64 (ref 0.26–1.65)
GFR SERPL CREATININE-BSD FRML MDRD: 57 ML/MIN/1.73M2
GLUCOSE SERPL-MCNC: 95 MG/DL (ref 70–99)
GLUCOSE UR STRIP.AUTO-MCNC: NEGATIVE MG/DL
KAPPA LC FREE SER-MCNC: 2.1 MG/DL (ref 0.37–1.94)
KETONES UR STRIP.AUTO-MCNC: ABNORMAL MG/DL
LAMBDA LC FREE SERPL-MCNC: 1.28 MG/DL (ref 0.57–2.63)
LEUKOCYTE ESTERASE UR QL STRIP.AUTO: ABNORMAL
NITRITE UR QL STRIP.AUTO: NEGATIVE
POTASSIUM SERPL-SCNC: 4.4 MMOL/L (ref 3.7–5.3)
PROT UR STRIP.AUTO-MCNC: 5 MG/DL (ref 5–8)
PROT UR STRIP.AUTO-MCNC: NEGATIVE MG/DL
RBC CLUMPS #/AREA URNS AUTO: ABNORMAL /HPF (ref 0–2)
SODIUM SERPL-SCNC: 139 MMOL/L (ref 135–144)
SODIUM,UR: <20 MMOL/L
SPECIFIC GRAVITY UA: 1.02 (ref 1–1.03)
TOTAL PROTEIN, URINE: 11 MG/DL
TURBIDITY: CLEAR
URINE HGB: NEGATIVE
UROBILINOGEN, URINE: NORMAL
WBC UA: ABNORMAL /HPF (ref 0–5)

## 2023-02-26 PROCEDURE — 80048 BASIC METABOLIC PNL TOTAL CA: CPT

## 2023-02-26 PROCEDURE — 84155 ASSAY OF PROTEIN SERUM: CPT

## 2023-02-26 PROCEDURE — 86225 DNA ANTIBODY NATIVE: CPT

## 2023-02-26 PROCEDURE — 84165 PROTEIN E-PHORESIS SERUM: CPT

## 2023-02-26 PROCEDURE — 99233 SBSQ HOSP IP/OBS HIGH 50: CPT | Performed by: FAMILY MEDICINE

## 2023-02-26 PROCEDURE — 84166 PROTEIN E-PHORESIS/URINE/CSF: CPT

## 2023-02-26 PROCEDURE — 83521 IG LIGHT CHAINS FREE EACH: CPT

## 2023-02-26 PROCEDURE — 84300 ASSAY OF URINE SODIUM: CPT

## 2023-02-26 PROCEDURE — 82088 ASSAY OF ALDOSTERONE: CPT

## 2023-02-26 PROCEDURE — 82570 ASSAY OF URINE CREATININE: CPT

## 2023-02-26 PROCEDURE — 36415 COLL VENOUS BLD VENIPUNCTURE: CPT

## 2023-02-26 PROCEDURE — 86160 COMPLEMENT ANTIGEN: CPT

## 2023-02-26 PROCEDURE — 84244 ASSAY OF RENIN: CPT

## 2023-02-26 PROCEDURE — 1200000000 HC SEMI PRIVATE

## 2023-02-26 PROCEDURE — 6360000002 HC RX W HCPCS: Performed by: FAMILY MEDICINE

## 2023-02-26 PROCEDURE — 86038 ANTINUCLEAR ANTIBODIES: CPT

## 2023-02-26 PROCEDURE — 6370000000 HC RX 637 (ALT 250 FOR IP): Performed by: FAMILY MEDICINE

## 2023-02-26 PROCEDURE — 84156 ASSAY OF PROTEIN URINE: CPT

## 2023-02-26 PROCEDURE — 81001 URINALYSIS AUTO W/SCOPE: CPT

## 2023-02-26 PROCEDURE — 83516 IMMUNOASSAY NONANTIBODY: CPT

## 2023-02-26 PROCEDURE — 86334 IMMUNOFIX E-PHORESIS SERUM: CPT

## 2023-02-26 PROCEDURE — 87205 SMEAR GRAM STAIN: CPT

## 2023-02-26 RX ORDER — KETOROLAC TROMETHAMINE 15 MG/ML
15 INJECTION, SOLUTION INTRAMUSCULAR; INTRAVENOUS
Status: COMPLETED | OUTPATIENT
Start: 2023-02-26 | End: 2023-02-26

## 2023-02-26 RX ORDER — MECLIZINE HCL 12.5 MG/1
12.5 TABLET ORAL ONCE
Status: COMPLETED | OUTPATIENT
Start: 2023-02-26 | End: 2023-02-26

## 2023-02-26 RX ORDER — CLONIDINE 0.1 MG/24H
1 PATCH, EXTENDED RELEASE TRANSDERMAL WEEKLY
Status: DISCONTINUED | OUTPATIENT
Start: 2023-02-26 | End: 2023-02-27

## 2023-02-26 RX ORDER — MECLIZINE HCL 25MG 25 MG/1
25 TABLET, CHEWABLE ORAL 3 TIMES DAILY PRN
Status: DISCONTINUED | OUTPATIENT
Start: 2023-02-26 | End: 2023-02-28 | Stop reason: HOSPADM

## 2023-02-26 RX ADMIN — GABAPENTIN 100 MG: 100 CAPSULE ORAL at 20:56

## 2023-02-26 RX ADMIN — NIFEDIPINE 30 MG: 30 TABLET, FILM COATED, EXTENDED RELEASE ORAL at 07:34

## 2023-02-26 RX ADMIN — MECLIZINE 12.5 MG: 12.5 TABLET ORAL at 09:07

## 2023-02-26 RX ADMIN — ONDANSETRON HYDROCHLORIDE 4 MG: 4 TABLET, FILM COATED ORAL at 09:08

## 2023-02-26 RX ADMIN — ATORVASTATIN CALCIUM 40 MG: 40 TABLET, FILM COATED ORAL at 07:34

## 2023-02-26 RX ADMIN — CLOPIDOGREL BISULFATE 75 MG: 75 TABLET ORAL at 07:34

## 2023-02-26 RX ADMIN — ACETAMINOPHEN 500 MG: 500 TABLET ORAL at 17:14

## 2023-02-26 RX ADMIN — ACETAMINOPHEN 500 MG: 500 TABLET ORAL at 11:36

## 2023-02-26 RX ADMIN — KETOROLAC TROMETHAMINE 15 MG: 15 INJECTION, SOLUTION INTRAMUSCULAR; INTRAVENOUS at 12:57

## 2023-02-26 RX ADMIN — CARVEDILOL 25 MG: 25 TABLET, FILM COATED ORAL at 11:36

## 2023-02-26 RX ADMIN — ACETAMINOPHEN 500 MG: 500 TABLET ORAL at 04:26

## 2023-02-26 RX ADMIN — LISINOPRIL 40 MG: 40 TABLET ORAL at 07:34

## 2023-02-26 RX ADMIN — PANTOPRAZOLE SODIUM 20 MG: 20 TABLET, DELAYED RELEASE ORAL at 07:34

## 2023-02-26 RX ADMIN — MECLIZINE 12.5 MG: 12.5 TABLET ORAL at 12:57

## 2023-02-26 RX ADMIN — ZOLPIDEM TARTRATE 5 MG: 5 TABLET ORAL at 21:16

## 2023-02-26 ASSESSMENT — ENCOUNTER SYMPTOMS
EYE REDNESS: 0
VOMITING: 0
EYE PAIN: 0
PHOTOPHOBIA: 0
STRIDOR: 0
EYE DISCHARGE: 0
SORE THROAT: 0
DIARRHEA: 0
BLOOD IN STOOL: 0
COUGH: 0
ABDOMINAL PAIN: 0
NAUSEA: 0
SHORTNESS OF BREATH: 0
CONSTIPATION: 0
BACK PAIN: 0

## 2023-02-26 ASSESSMENT — PAIN SCALES - GENERAL: PAINLEVEL_OUTOF10: 4

## 2023-02-26 NOTE — CARE COORDINATION
Case Management Initial Discharge Plan  [facility],             Met with:patient to discuss discharge plans. Information verified: address, contacts, phone number, , insurance Yes  PCP: Ayan Matamoros MD  Date of last visit: 2023    Insurance Provider: [facility] Medicare    Discharge Planning    Living Arrangements:  Children (daughter)  Support Systems:  Children, Family Members, Friends/Neighbors    Home has 2 stories  2 stairs to climb to get into front door, 12 stairs to climb to reach second floor  Location of bedroom/bathroom in home  - second floor    Patient able to perform ADL's:Independent    Current Services (outpatient & in home) none  DME equipment: none  DME provider: N/A    Pharmacy: [facility] [place] and [address]    Potential Assistance Needed:  N/A    Patient agreeable to home care: No  [place] of choice provided:  n/a    Prior SNF/Rehab Placement and Facility: No  Agreeable to SNF/Rehab: No  [place] of choice provided: n/a   Evaluation: n/a    Expected Discharge date:     Patient expects to be discharged to:   home  Follow Up Appointment: Best Day/ Time:      Transportation provider: daughter  Transportation arrangements needed for discharge: No    Readmission Risk              Risk of Unplanned Readmission:  10             Does patient have a readmission risk score greater than 14?: No  If yes, follow-up appointment must be made within 7 days of discharge. Goal of Care:       Discharge Plan: Met with patient at bedside. Lives with daughter, independent and drives. Admitted for dizziness and HTN. Started on new medication Thursday by PCP. Nephrology consulted. Continue to follow and no home needs anticipated.           Electronically signed by Alli Dai RN on 23 at 10:17 AM EST

## 2023-02-26 NOTE — PLAN OF CARE
Problem: Discharge Planning  Goal: Discharge to home or other facility with appropriate resources  Outcome: Progressing  Flowsheets (Taken 2/25/2023 1518 by Rajiv Mann RN)  Discharge to home or other facility with appropriate resources:   Identify barriers to discharge with patient and caregiver   Arrange for needed discharge resources and transportation as appropriate   Identify discharge learning needs (meds, wound care, etc)     Problem: ABCDS Injury Assessment  Goal: Absence of physical injury  Outcome: Progressing

## 2023-02-26 NOTE — PLAN OF CARE
Adjusting bp meds to control bp. Patient c/o severe headache and dizziness, receiving antivert for this. Will continue to monitor. l

## 2023-02-26 NOTE — CONSULTS
Nephrology Consult Note    Reason for Consult:  uncontrolled hypertension  Requesting Physician:  Dr. Dylon Graf    Chief Complaint:  Uncontrolled HTN associated with dizziness and left arm tingling    History Obtained From:  patient, EMR, RN    History of Present Illness: This is a 81 y.o. female who presented to the hospital for evaluation of HTN poorly controlled with associated dizziness and left arm tingling. Has been following Dr. Dylon Graf in the office a couple times as of lately including last Thursday. She was in the ED at [facility] on 1/9/23 with similar issue and BP was controlled with IV labetalol and hydralazine. She was in the office with Dr. Dylon Graf a couple weeks ago, felt she was not tolerating the hydralazine. As soon as she did the left arm tingling and dizziness returned along with HA's. She was started on amlodipine, but after a week she was back c/o leg swelling and feeling \"bad\". At that point it was stopped and she went up on lisinopril dose from 20mg akers to 30mg daily and lopressor was changed to Coreg. Per EMR she called Dr. Lorrie Rosales office many times with varying sx -advised to go to ER for eval but did not stating she had had all tests including MRI brain as well as CT done on initial ED eval at [facility]. She was seen in the office last Thursday again because of continued BP elevation and sx and was started her on clonidine but she did not respond but finally decided to come to the ER for evaluation. In ED her BP remained high. Decision to admit was made and Nephrology is being asked to see her. In ED SBP running 180's/190. Ultimately it responded well to Procardia XL 30mg BID, and by 7pm her SBP was in 130. Overnight and into today -122. HR stable 70's. Patient states she feels better. Her Clonidine dose was decreased to 0.1mg. She did have some dizziness, improved with Antivert.      She reports having HTN over 30 years, has never seen a Nephrologist nor had any known renal issues. She reports while in [place] she was maintained on Lisinopril 30mg BID and this worked well for her, but the high dose was reduced by her PCP due to potential for kidney damage. Her baseline Cr appears 0.9 to 1.1. Cr at admission 0.95 Na 141 K 4.3 chloride 103 BUN 15 AG 9 Mag 1.7, glucose 94, calcium 9.7. Pt denies any hx of heavy or prolonged NSAID use. There is no history of blood or bone marrow disorders. There is no hx of jaundice or hepatitis or sexually transmitted disease. Pt has no hx of collagen vascular disease or vasculitis. No history of dysuria or frequency. No recent procedures involving IV contrast.   There is no hx of paraprotein disease. No hx of recurrent UTI , incontinence or recurrent nephrolithiasis. Medication review shows use of ace.    Past Medical History:        Diagnosis Date    Anemia 6/9/2020    Cervical radiculopathy 6/9/2020    Dizziness and giddiness 1/6/2023    Dyslipidemia 8/15/2019    Early menopause 9/8/2020    Ex-smoker for more than 1 year 8/15/2019    Smoked for 15 years and Quit Over 30 years ago up to 1 PPD    History of arterial ischemic stroke 8/15/2019    History of bleeding peptic ulcer 8/15/2019    History of cerebrovascular accident 8/15/2019    Hypertension     Hypertensive urgency 1/8/2023    Intestinal metaplasia of gastric mucosa 6/9/2020    Narrowing of intervertebral disc space 12/16/2020    Osteopenia 6/9/2020    Osteopenia 6/9/2020    Other specified abnormalities of plasma proteins 1/6/2023       Past Surgical History:        Procedure Laterality Date    APPENDECTOMY      CHOLECYSTECTOMY      HYSTERECTOMY, TOTAL ABDOMINAL (CERVIX REMOVED)      TONSILLECTOMY         Current Medications:    meclizine (ANTIVERT) chewable tablet 25 mg, TID PRN  cloNIDine (CATAPRES) 0.1 MG/24HR 1 patch, Weekly  atorvastatin (LIPITOR) tablet 40 mg, Daily  carvedilol (COREG) tablet 25 mg, BID  NIFEdipine (PROCARDIA XL) extended release tablet 30 mg, Daily  clopidogrel (PLAVIX) tablet 75 mg, Daily  gabapentin (NEURONTIN) capsule 100 mg, Nightly  pantoprazole (PROTONIX) tablet 20 mg, QAM AC  lisinopril (PRINIVIL;ZESTRIL) tablet 40 mg, Daily  acetaminophen (TYLENOL) tablet 500 mg, Q6H PRN  ondansetron (ZOFRAN) tablet 4 mg, Q8H PRN  zolpidem (AMBIEN) tablet 5 mg, Nightly PRN  hydrALAZINE (APRESOLINE) injection 20 mg, Q6H PRN        Allergies:  Amlodipine, Clarithromycin, Effexor [venlafaxine], Hydralazine, Levaquin  [levofloxacin in d5w], and Levofloxacin    Social History:   Social History     Socioeconomic History    Marital status:      Spouse name: Not on file    Number of children: Not on file    Years of education: Not on file    Highest education level: Not on file   Occupational History    Not on file   Tobacco Use    Smoking status: Former     Packs/day: 1.00     Years: 15.00     Pack years: 15.00     Types: Cigarettes     Quit date: 8/15/1989     Years since quittin.5    Smokeless tobacco: Never   Substance and Sexual Activity    Alcohol use: Yes     Comment: every now and then     Drug use: Never    Sexual activity: Not on file   Other Topics Concern    Not on file   Social History Narrative    Not on file     Social Determinants of Health     Financial Resource Strain: Low Risk     Difficulty of Paying Living Expenses: Not hard at all   Food Insecurity: No Food Insecurity    Worried About [facility] in the Last Year: Never true    [facility] in the Last Year: Never true   Transportation Needs: Unknown    Lack of Transportation (Medical): Not on file    Lack of Transportation (Non-Medical):  No   Physical Activity: Insufficiently Active    Days of Exercise per Week: 2 days    Minutes of Exercise per Session: 10 min   Stress: Not on file   Social Connections: Not on file   Intimate Partner Violence: Not on file   Housing Stability: Unknown    Unable to Pay for Housing in the Last Year: Not on file    Number of Places Lived in the Last Year: Not on file    Unstable Housing in the Last Year: No       Family History:   Family History   Problem Relation Age of Onset    Breast Cancer Mother     Breast Cancer Niece     Breast Cancer Maternal Aunt     Breast Cancer Maternal Aunt        Review of Systems:    Constitutional: No fever, no chills, no lethargy, no weakness. HEENT:  No headache, otalgia, itchy eyes, nasal discharge or sore throat. Cardiac:  No chest pain, dyspnea, orthopnea or PND. Chest:              No cough, phlegm or wheezing. Abdomen:  No abdominal pain, nausea or vomiting. Neuro:   +dizziness, headaches  Skin:   No rashes, no itching. :   No hematuria, no pyuria, no dysuria, no flank pain. Extremities:  No swelling or joint pains. Objective:  CURRENT TEMPERATURE:  Temp: 97.7 °F (36.5 °C)  MAXIMUM TEMPERATURE OVER 24HRS:  Temp (24hrs), Av °F (36.7 °C), Min:97.3 °F (36.3 °C), Max:98.4 °F (36.9 °C)    CURRENT RESPIRATORY RATE:  Resp: 18  CURRENT PULSE:  Heart Rate: 63  CURRENT BLOOD PRESSURE:  BP: (!) 122/51  24HR BLOOD PRESSURE RANGE:  Systolic (34ZTG), JMY:589 , Min:116 , MZL:783   ; Diastolic (97XHI), QFK:79, Min:44, Max:80    24HR INTAKE/OUTPUT:  No intake or output data in the 24 hours ending 23 1411  Patient Vitals for the past 96 hrs (Last 3 readings):   Weight   23 0915 110 lb (49.9 kg)     Physical Exam:  General appearance:Awake, alert, in no acute distress  Skin: warm and dry, no rash or erythema  Eyes: conjunctivae normal and sclera anicteric  ENT: :no thrush no pharyngeal congestion    Neck: no carotid bruit, no JVD  Pulmonary: no wheezing or rhonchi. No rales heard.   Cardiovascular: Normal S1 & S2,  No S3 or  S4, no Pericardial Rub no Murmur   Abdomen: soft nontender, bowel sounds present, ND, no fluid wave  Extremities:no cyanosis, clubbing or edema    Labs:   CBC:  Recent Labs     23  1021   WBC 4.2   RBC 3.51*   HGB 10.9*   HCT 34.8*   MCV 99.1   MCH 31.1   MCHC 31.3   RDW 12.6      MPV 10.3      BMP:   Recent Labs     02/25/23  1021 02/26/23  0531    139   K 4.3 4.4    102   CO2 29 28   BUN 15 18   CREATININE 0.95* 0.99*   GLUCOSE 94 95   CALCIUM 9.7 9.2        Phosphorus:  No results for input(s): PHOS in the last 72 hours. Magnesium:   Recent Labs     02/25/23  1021   MG 1.7     Albumin: No results for input(s): LABALBU in the last 72 hours. IRON:    Lab Results   Component Value Date/Time    IRON 62 02/10/2020 09:40 AM     Iron Saturation:  No components found for: PERCENTFE  TIBC:    Lab Results   Component Value Date/Time    TIBC 301 02/10/2020 09:40 AM     FERRITIN:    Lab Results   Component Value Date/Time    FERRITIN 40 02/10/2020 09:40 AM     SPEP:   Lab Results   Component Value Date/Time    PROT 6.6 09/08/2022 09:55 AM     UPEP: No results found for: TPU     C3: No results found for: C3  C4: No results found for: C4  MPO ANCA:  No results found for: MPO . PR3 ANCA:  No results found for: PR3  Urine Sodium:  No results found for: GAVIN   Urine Creatinine:  No results found for: LABCREA  Urine Eosinophils: No results found for: UREO  Urine Protein:  No results found for: TPU  Urine osm : No results found for: OSMOU  Urinalysis:  U/A: No results found for: Jeff Escort, PHUR, LABCAST, WBCUA, RBCUA, MUCUS, TRICHOMONAS, YEAST, BACTERIA, CLARITYU, SPECGRAV, LEUKOCYTESUR, UROBILINOGEN, BILIRUBINUR, BLOODU, GLUCOSEU, KETUA, [facility]      Radiology:  CXR 2/25/23  Mild bibasilar infiltrates or atelectasis. Correlate clinically to exclude   pneumonia with consideration for imaging follow-up. Findings associated with COPD. Assessment:  1. HTN Urgency question of essential htn vs secondary causes. 2. BL Cr 0.9-1.1 Cr at baseline  3. HYPERTENSION    4. Dyslipidemia  5. Anemia  6. Former smoker  7. Hx CVA       Plan:  1. Will Check Renal Ultrasound to r/o element of obstruction and to assess the kidney size/echotexture.   2. Urine testing including Urinalysis, Urine sodium, , Urine protein and creatinine . Will check urinary eosinophils as well. 3. Will Order serum and urine protein electrophoresis, light chain ratio  to r/o occult paraprotein disease. 4. Will order Hepatitis B and C, SUKH, ANCA, Complement levels. 5. Check renin/aldosterone ratio. 6. Evaluate for renal artery stenosis via renal artery dopplers. 7. Continue present antihypertensive regimen for now. 8. Will follow. Will discuss with Dr. Charmayne All  Please do not hesitate to call with questions.     Electronically signed by DORENE Dean on 2/26/2023 at 2:11 PM  [facility]

## 2023-02-26 NOTE — ACP (ADVANCE CARE PLANNING)
Advance Care Planning     Advance Care Planning Activator (Inpatient)  Conversation Note      Date of ACP Conversation: 2/26/2023     Conversation Conducted with: Patient with Decision Making Capacity    ACP Activator: Rome Cleaning RN    {When Decision Maker makes decisions on behalf of the incapacitated patient: Decision Maker is asked to consider and make decisions based on patient values, known preferences, or best interests. Health Care Decision Maker:     Current Designated Health Care Decision Maker:     Primary Decision Maker: Bhakti Simms [facility] [phone]  Click here to complete Healthcare Decision Makers including section of the Healthcare Decision Maker Relationship (ie \"Primary\")      Care Preferences    Ventilation: \"If you were in your present state of health and suddenly became very ill and were unable to breathe on your own, what would your preference be about the use of a ventilator (breathing machine) if it were available to you? \"      Would the patient desire the use of ventilator (breathing machine)?: yes    \"If your health worsens and it becomes clear that your chance of recovery is unlikely, what would your preference be about the use of a ventilator (breathing machine) if it were available to you? \"     Would the patient desire the use of ventilator (breathing machine)?: No      Resuscitation  \"CPR works best to restart the heart when there is a sudden event, like a heart attack, in someone who is otherwise healthy. Unfortunately, CPR does not typically restart the heart for people who have serious health conditions or who are very sick. \"    \"In the event your heart stopped as a result of an underlying serious health condition, would you want attempts to be made to restart your heart (answer \"yes\" for attempt to resuscitate) or would you prefer a natural death (answer \"no\" for do not attempt to resuscitate)? \" yes       [] Yes   [x] No   Educated Patient / Laurita Valente regarding differences between Advance Directives and portable DNR orders.     Length of ACP Conversation in minutes:  5    Conversation Outcomes:  ACP discussion completed    Follow-up plan:    [] Schedule follow-up conversation to continue planning  [] Referred individual to Provider for additional questions/concerns   [] Advised patient/agent/surrogate to review completed ACP document and update if needed with changes in condition, patient preferences or care setting    [] This note routed to one or more involved healthcare providers

## 2023-02-26 NOTE — PROGRESS NOTES
Progress Note  Date:2023       Room:  Patient Shaista Castro     Date of Birth:     Age:82 y.o. Subjective    Subjective:  Symptoms:  Worsening. She reports headache. No shortness of breath, cough, chest pain, weakness or diarrhea. Diet:  Poor intake. No nausea or vomiting. Activity level: Impaired due to weakness. Pain:  She complains of pain that is moderate. She reports pain is unchanged. Pain is requiring pain medication. Review of Systems   Constitutional:  Positive for activity change and appetite change. Negative for chills and fever. HENT:  Negative for congestion, ear pain, hearing loss, nosebleeds, sore throat and tinnitus. Eyes:  Negative for photophobia, pain, discharge and redness. Respiratory:  Negative for cough, shortness of breath and stridor. Cardiovascular:  Negative for chest pain, palpitations and leg swelling. Gastrointestinal:  Negative for abdominal pain, blood in stool, constipation, diarrhea, nausea and vomiting. Endocrine: Negative for polydipsia. Genitourinary:  Negative for dysuria, flank pain, frequency, hematuria and urgency. Musculoskeletal:  Negative for arthralgias, back pain, gait problem, myalgias and neck pain. Skin:  Negative for rash. Allergic/Immunologic: Negative for environmental allergies. Neurological:  Positive for dizziness and headaches. Negative for tremors, seizures and weakness. Hematological:  Does not bruise/bleed easily. Psychiatric/Behavioral:  Negative for hallucinations and suicidal ideas. The patient is not nervous/anxious.     Objective         Vitals Last 24 Hours:  TEMPERATURE:  Temp  Av °F (36.7 °C)  Min: 97.3 °F (36.3 °C)  Max: 98.4 °F (36.9 °C)  RESPIRATIONS RANGE: Resp  Av.1  Min: 16  Max: 18  PULSE OXIMETRY RANGE: SpO2  Av %  Min: 92 %  Max: 98 %  PULSE RANGE: Pulse  Av.9  Min: 63  Max: 71  BLOOD PRESSURE RANGE: Systolic (57JUD), KE , Min:116 , NICOLE:776   ; Diastolic (41HNH), SEE:66, Min:44, Max:84    I/O (24Hr): No intake or output data in the 24 hours ending 02/26/23 1211  Objective:  General Appearance: In pain. Vital signs: (most recent): Blood pressure (!) 122/51, pulse 63, temperature 97.7 °F (36.5 °C), temperature source Oral, resp. rate 18, height 5' (1.524 m), weight 110 lb (49.9 kg), SpO2 94 %. No fever. (BP is low normal, afebrile). Output: Producing urine. HEENT: Normal HEENT exam.    Lungs:  Normal effort and normal respiratory rate. Breath sounds clear to auscultation. Heart: Normal rate. Regular rhythm. S1 normal and S2 normal.  No murmur or gallop. Chest: Symmetric chest wall expansion. Abdomen: Abdomen is soft. Hyperactive bowel sounds. There is no abdominal tenderness. Extremities: Normal range of motion. Pulses: Distal pulses are intact. Neurological: Patient is alert and oriented to person, place and time. Pupils:  Pupils are equal, round, and reactive to light. Skin:  Warm and dry. Labs/Imaging/Diagnostics    Labs:  CBC:  Recent Labs     02/25/23  1021   WBC 4.2   RBC 3.51*   HGB 10.9*   HCT 34.8*   MCV 99.1   RDW 12.6        CHEMISTRIES:  Recent Labs     02/25/23  1021 02/26/23  0531    139   K 4.3 4.4    102   CO2 29 28   BUN 15 18   CREATININE 0.95* 0.99*   GLUCOSE 94 95   MG 1.7  --      PT/INR:No results for input(s): PROTIME, INR in the last 72 hours. APTT:No results for input(s): APTT in the last 72 hours. LIVER PROFILE:No results for input(s): AST, ALT, BILIDIR, BILITOT, ALKPHOS in the last 72 hours. Imaging Last 24 Hours:  XR CHEST PORTABLE    Result Date: 2/25/2023  EXAMINATION: ONE XRAY VIEW OF THE CHEST 2/25/2023 9:53 am COMPARISON: Chest x-ray from 10/10/2021 HISTORY: ORDERING SYSTEM PROVIDED HISTORY: Chest Pain TECHNOLOGIST PROVIDED HISTORY: Chest Pain Reason for Exam: Pt eval for f/u SOB FINDINGS: The cardiac silhouette is borderline in size.   No pleural effusion or pneumothorax. Mild interstitial prominence at the lung bases favoring mild atelectasis, less likely pneumonia. Pulmonary hyperinflation is suspicious for COPD. Mild bibasilar infiltrates or atelectasis. Correlate clinically to exclude pneumonia with consideration for imaging follow-up. Findings associated with COPD. Assessment//Plan           Hospital Problems             Last Modified POA    * (Principal) Uncontrolled hypertension 2/25/2023 Yes    Dizziness and giddiness 2/25/2023 Yes    Vertigo 2/26/2023 Yes    Dyslipidemia 2/25/2023 Yes    Neuropathy 2/25/2023 Yes    B12 deficiency 2/25/2023 Yes    Vitamin D deficiency 2/25/2023 Yes    Gaytan's esophagus without dysplasia 2/25/2023 Yes    Overview Signed 10/18/2021 11:46 AM by Erin Bean MD     EGD done by Dr Eyad Hernandez in aug 2021-advised no further egd for follow up. To contiue current meds that include 20 mg Protonix daily         Gastroesophageal reflux disease 2/25/2023 Yes    Essential hypertension 2/25/2023 Yes    Age-related osteoporosis without current pathological fracture 2/25/2023 Yes    SARAH (generalized anxiety disorder) 2/25/2023 Yes    Other insomnia 2/25/2023 Yes    Stage 3a chronic kidney disease ([facility] [address] 2/25/2023 Yes     Assessment:    Condition: Unchanged. (HTN- currently under control  Multiple BP med intolerance  CKD   Dyslipidemia  GERD  SARAH  Insomnia  ). Plan:   Ad fede activity and per physical therapy. Start/continue incentive spirometry. Consults: nephrology. Restricted diet and advance diet as tolerated. Administer medications as ordered. (Will wean down the catapress patch to 0.1 mg as BP is low normal and patient C/O HA  Nephrology consult PND  Cont meds as reconciled  ).      Electronically signed by Sal Sequeira MD on 2/26/23 at 12:11 PM EST

## 2023-02-26 NOTE — PROGRESS NOTES
[facility] NOTE    Room # 2003/2003-02   Name: Edi Stanley              Reason for visit: Routine    I visited the patient. Admit Date & Time: 2/25/2023  9:17 AM    Assessment:  Edi Stanley is a 81 y.o. female. Upon entering the room patient States wants to sleep. States about her pain. Short visit. Intervention:   provided a ministry presence and brief prayer. Outcome:  Patient did not respond. Plan:  Chaplains will remain available to offer spiritual and emotional support as needed. Electronically signed by Chaplain Krishna, on 2/26/2023 at 1:20 PM.  Bobbi      02/26/23 1318   Encounter Summary   Service Provided For: Patient   Referral/Consult From: [facility]   Support System Unknown   Last Encounter  02/26/23   Complexity of Encounter Low   Begin Time 1140   End Time  1148   Total Time Calculated 8 min   Encounter    Type Initial Screen/Assessment   Assessment/Intervention/Outcome   Assessment Passive   Intervention Discussed illness injury and its impact; Discussed belief system/Latter-day practices/tata;Prayer (assurance of)/[place];Sustaining Presence/Ministry of presence   Outcome Engaged in conversation;Expressed Gratitude;Receptive

## 2023-02-27 ENCOUNTER — APPOINTMENT (OUTPATIENT)
Dept: ULTRASOUND IMAGING | Age: 83
DRG: 305 | End: 2023-02-27
Payer: MEDICARE

## 2023-02-27 PROBLEM — I16.0 HYPERTENSIVE URGENCY: Status: ACTIVE | Noted: 2023-02-27

## 2023-02-27 LAB
ALBUMIN (CALCULATED): 3.7 G/DL (ref 3.2–5.2)
ALBUMIN PERCENT: 69 % (ref 45–65)
ALPHA 1 PERCENT: 3 % (ref 3–6)
ALPHA 2 PERCENT: 13 % (ref 6–13)
ALPHA1 GLOB SERPL ELPH-MCNC: 0.2 G/DL (ref 0.1–0.4)
ALPHA2 GLOB SERPL ELPH-MCNC: 0.7 G/DL (ref 0.5–0.9)
ANA SER QL IA: NEGATIVE
ANCA MYELOPEROXIDASE: <0.3 AU/ML (ref 0–3.5)
ANCA PROTEINASE 3: <0.7 AU/ML (ref 0–2)
ANION GAP SERPL CALCULATED.3IONS-SCNC: 11 MMOL/L (ref 9–17)
B-GLOBULIN SERPL ELPH-MCNC: 0.5 G/DL (ref 0.5–1.1)
BETA PERCENT: 9 % (ref 11–19)
BUN SERPL-MCNC: 28 MG/DL (ref 8–23)
BUN/CREAT BLD: 27 (ref 9–20)
CALCIUM SERPL-MCNC: 8.9 MG/DL (ref 8.6–10.4)
CHLORIDE SERPL-SCNC: 102 MMOL/L (ref 98–107)
CO2 SERPL-SCNC: 28 MMOL/L (ref 20–31)
CREAT SERPL-MCNC: 1.05 MG/DL (ref 0.5–0.9)
CRP SERPL HS-MCNC: <3 MG/L (ref 0–5)
DSDNA IGG SER QL IA: <0.5 IU/ML
EOSINOPHIL,URINE: NORMAL
ERYTHROCYTE [SEDIMENTATION RATE] IN BLOOD BY WESTERGREN METHOD: 4 MM/HR (ref 0–30)
GAMMA GLOB SERPL ELPH-MCNC: 0.3 G/DL (ref 0.5–1.5)
GAMMA GLOBULIN %: 6 % (ref 9–20)
GFR SERPL CREATININE-BSD FRML MDRD: 53 ML/MIN/1.73M2
GLUCOSE SERPL-MCNC: 96 MG/DL (ref 70–99)
NUCLEAR IGG SER IA-RTO: 0.1 U/ML
P E INTERPRETATION, U: NORMAL
PATHOLOGIST: ABNORMAL
PATHOLOGIST: NORMAL
PATHOLOGIST: NORMAL
POTASSIUM SERPL-SCNC: 4.7 MMOL/L (ref 3.7–5.3)
PROT SERPL-MCNC: 5.4 G/DL (ref 6.4–8.3)
PROTEIN ELECTROPHORESIS, SERUM: ABNORMAL
SERUM IFX INTERP: NORMAL
SODIUM SERPL-SCNC: 141 MMOL/L (ref 135–144)
SPECIMEN TYPE: NORMAL
TOTAL PROT. SUM,%: 100 % (ref 98–102)
TOTAL PROT. SUM: 5.4 G/DL (ref 6.3–8.2)
URINE TOTAL PROTEIN: 11 MG/DL

## 2023-02-27 PROCEDURE — 93975 VASCULAR STUDY: CPT

## 2023-02-27 PROCEDURE — 1200000000 HC SEMI PRIVATE

## 2023-02-27 PROCEDURE — 36415 COLL VENOUS BLD VENIPUNCTURE: CPT

## 2023-02-27 PROCEDURE — 6370000000 HC RX 637 (ALT 250 FOR IP): Performed by: FAMILY MEDICINE

## 2023-02-27 PROCEDURE — 6360000002 HC RX W HCPCS: Performed by: PSYCHIATRY & NEUROLOGY

## 2023-02-27 PROCEDURE — 80048 BASIC METABOLIC PNL TOTAL CA: CPT

## 2023-02-27 PROCEDURE — 99233 SBSQ HOSP IP/OBS HIGH 50: CPT | Performed by: FAMILY MEDICINE

## 2023-02-27 PROCEDURE — 86140 C-REACTIVE PROTEIN: CPT

## 2023-02-27 PROCEDURE — 99222 1ST HOSP IP/OBS MODERATE 55: CPT | Performed by: PSYCHIATRY & NEUROLOGY

## 2023-02-27 PROCEDURE — 83835 ASSAY OF METANEPHRINES: CPT

## 2023-02-27 PROCEDURE — 85652 RBC SED RATE AUTOMATED: CPT

## 2023-02-27 PROCEDURE — 76770 US EXAM ABDO BACK WALL COMP: CPT

## 2023-02-27 RX ORDER — DEXAMETHASONE SODIUM PHOSPHATE 10 MG/ML
4 INJECTION, SOLUTION INTRAMUSCULAR; INTRAVENOUS ONCE
Status: COMPLETED | OUTPATIENT
Start: 2023-02-27 | End: 2023-02-27

## 2023-02-27 RX ORDER — MAGNESIUM SULFATE 1 G/100ML
1000 INJECTION INTRAVENOUS ONCE
Status: COMPLETED | OUTPATIENT
Start: 2023-02-27 | End: 2023-02-27

## 2023-02-27 RX ORDER — LISINOPRIL 20 MG/1
20 TABLET ORAL DAILY
Status: DISCONTINUED | OUTPATIENT
Start: 2023-02-28 | End: 2023-02-28 | Stop reason: HOSPADM

## 2023-02-27 RX ORDER — METOCLOPRAMIDE HYDROCHLORIDE 5 MG/ML
10 INJECTION INTRAMUSCULAR; INTRAVENOUS ONCE
Status: COMPLETED | OUTPATIENT
Start: 2023-02-27 | End: 2023-02-27

## 2023-02-27 RX ADMIN — PANTOPRAZOLE SODIUM 20 MG: 20 TABLET, DELAYED RELEASE ORAL at 09:47

## 2023-02-27 RX ADMIN — DEXAMETHASONE SODIUM PHOSPHATE 4 MG: 10 INJECTION INTRAMUSCULAR; INTRAVENOUS at 14:39

## 2023-02-27 RX ADMIN — MAGNESIUM SULFATE HEPTAHYDRATE 1000 MG: 1 INJECTION, SOLUTION INTRAVENOUS at 14:56

## 2023-02-27 RX ADMIN — METOCLOPRAMIDE 10 MG: 5 INJECTION, SOLUTION INTRAMUSCULAR; INTRAVENOUS at 14:38

## 2023-02-27 RX ADMIN — GABAPENTIN 100 MG: 100 CAPSULE ORAL at 21:35

## 2023-02-27 RX ADMIN — ZOLPIDEM TARTRATE 5 MG: 5 TABLET ORAL at 21:35

## 2023-02-27 RX ADMIN — MECLIZINE HYDROCHLORIDE 25 MG: 25 TABLET, CHEWABLE ORAL at 04:46

## 2023-02-27 RX ADMIN — CLOPIDOGREL BISULFATE 75 MG: 75 TABLET ORAL at 09:47

## 2023-02-27 RX ADMIN — ATORVASTATIN CALCIUM 40 MG: 40 TABLET, FILM COATED ORAL at 09:47

## 2023-02-27 ASSESSMENT — ENCOUNTER SYMPTOMS
CONSTIPATION: 0
PHOTOPHOBIA: 0
BACK PAIN: 0
DIARRHEA: 0
NAUSEA: 0
EYE DISCHARGE: 0
STRIDOR: 0
COUGH: 0
SORE THROAT: 0
SHORTNESS OF BREATH: 0
BLOOD IN STOOL: 0
EYE REDNESS: 0
VOMITING: 0
EYE PAIN: 0
ABDOMINAL PAIN: 0

## 2023-02-27 ASSESSMENT — PAIN DESCRIPTION - ORIENTATION
ORIENTATION: MID;ANTERIOR
ORIENTATION: MID;ANTERIOR

## 2023-02-27 ASSESSMENT — PAIN SCALES - GENERAL
PAINLEVEL_OUTOF10: 2
PAINLEVEL_OUTOF10: 6

## 2023-02-27 ASSESSMENT — PAIN DESCRIPTION - DESCRIPTORS
DESCRIPTORS: ACHING
DESCRIPTORS: ACHING

## 2023-02-27 ASSESSMENT — PAIN DESCRIPTION - FREQUENCY: FREQUENCY: CONTINUOUS

## 2023-02-27 ASSESSMENT — PAIN - FUNCTIONAL ASSESSMENT
PAIN_FUNCTIONAL_ASSESSMENT: ACTIVITIES ARE NOT PREVENTED
PAIN_FUNCTIONAL_ASSESSMENT: ACTIVITIES ARE NOT PREVENTED

## 2023-02-27 ASSESSMENT — PAIN DESCRIPTION - ONSET
ONSET: ON-GOING
ONSET: ON-GOING

## 2023-02-27 ASSESSMENT — PAIN DESCRIPTION - PAIN TYPE
TYPE: CHRONIC PAIN
TYPE: CHRONIC PAIN;ACUTE PAIN

## 2023-02-27 ASSESSMENT — PAIN DESCRIPTION - LOCATION
LOCATION: HEAD
LOCATION: HEAD

## 2023-02-27 NOTE — CONSULTS
[facility]  [place], [address], [address] [place] [address]  Ph: [phone] or [phone]  FAX: [phone]        Reason for consult: Headache disorder    I had the pleasure of seeing your patient in neurology consultation for her symptoms. As you would recall Ghulam Neville is a 81 y.o. yo female admitted on 2/25/2023. The patient presented to the ER with sudden onset of left arm tingling and dizziness along with elevated blood pressure. Upon arrival, her systolic blood pressure was 180/190. She received Procardia. On outpatient basis, the patient had been on clonidine. The patient reports having similar symptoms in January 2023 when she went to [facility].  At that time her work-up included MRI scan of the brain which was unremarkable. Since then, the patient reports having constant occipital dysesthesia and pressure-like feeling. The patient reports having a previous history of headaches all along her life. The patient believes that migraine was considered at 1 point but was not formally diagnosed and patient had been taking gabapentin 100 mg p.o. daily for last 5 years with good improvement of the symptoms.   Past Medical History:   Diagnosis Date    Anemia 6/9/2020    Cervical radiculopathy 6/9/2020    Dizziness and giddiness 1/6/2023    Dyslipidemia 8/15/2019    Early menopause 9/8/2020    Ex-smoker for more than 1 year 8/15/2019    Smoked for 15 years and Quit Over 30 years ago up to 1 PPD    History of arterial ischemic stroke 8/15/2019    History of bleeding peptic ulcer 8/15/2019    History of cerebrovascular accident 8/15/2019    Hypertension     Hypertensive urgency 1/8/2023    Intestinal metaplasia of gastric mucosa 6/9/2020    Narrowing of intervertebral disc space 12/16/2020    Osteopenia 6/9/2020    Osteopenia 6/9/2020    Other specified abnormalities of plasma proteins 1/6/2023     Past Surgical History:   Procedure Laterality Date    APPENDECTOMY CHOLECYSTECTOMY      HYSTERECTOMY, TOTAL ABDOMINAL (CERVIX REMOVED)      TONSILLECTOMY       Social History     Socioeconomic History    Marital status:      Spouse name: Not on file    Number of children: Not on file    Years of education: Not on file    Highest education level: Not on file   Occupational History    Not on file   Tobacco Use    Smoking status: Former     Packs/day: 1.00     Years: 15.00     Pack years: 15.00     Types: Cigarettes     Quit date: 8/15/1989     Years since quittin.5    Smokeless tobacco: Never   Substance and Sexual Activity    Alcohol use: Yes     Comment: every now and then     Drug use: Never    Sexual activity: Not on file   Other Topics Concern    Not on file   Social History Narrative    Not on file     Social Determinants of Health     Financial Resource Strain: Low Risk     Difficulty of Paying Living Expenses: Not hard at all   Food Insecurity: No Food Insecurity    Worried About [address] in the Last Year: Never true    [address] in the Last Year: Never true   Transportation Needs: Unknown    Lack of Transportation (Medical): Not on file    Lack of Transportation (Non-Medical):  No   Physical Activity: Insufficiently Active    Days of Exercise per Week: 2 days    Minutes of Exercise per Session: 10 min   Stress: Not on file   Social Connections: Not on file   Intimate Partner Violence: Not on file   Housing Stability: Unknown    Unable to Pay for Housing in the Last Year: Not on file    Number of Places Lived in the Last Year: Not on file    Unstable Housing in the Last Year: No     Family History   Problem Relation Age of Onset    Breast Cancer Mother     Breast Cancer Niece     Breast Cancer Maternal Aunt     Breast Cancer Maternal Aunt       Allergies   Allergen Reactions    Amlodipine      Leg swelling and metallic taste in mouth    Clarithromycin      Was on this for H Pylori- Palpitations, chest pain was admitted in Hospital    Effexor [Venlafaxine]      sweaty and feels hot flashes that she never had before this med    Hydralazine      Metallic taste in mouth    Levaquin  [Levofloxacin In D5w]     Levofloxacin      palpitations      BP (!) 116/54   Pulse 69   Temp 97.5 °F (36.4 °C) (Oral)   Resp 16   Ht 5' (1.524 m)   Wt 110 lb (49.9 kg)   SpO2 96%   BMI 21.48 kg/m²      ROS:  Constitutional Negative for fever and chills   HEENT Negative for ear discharge, ear pain, nosebleed   Eyes Negative for photophobia, pain and discharge   Respiratory Negative for hemoptysis and sputum   Cardiovascular Negative for orthopnea, claudication and PND   Gastrointestinal Negative for abdominal pain, diarrhea, blood in stool   Musculoskeletal Negative for joint pain, negative for myalgia   Skin Negative for rash or itching   Endo/heme/allergies Negative for polydipsia, environmental allergy   Psychiatric/behavioral Negative for suicidal ideation.   Patient is not anxious   General examination:    Head: Normocephalic, atraumatic  Eyes: Extraocular movements intact  Lungs: Respirations unlabored, chest wall no deformity  ENT: Normal external ear canals, no sinus tenderness  Heart: Regular rate rhythm  Abdomen: No masses, tenderness  Extremities: No cyanosis or edema, 2+ pulses  Skin: Intact, normal skin color    Neurological examination:    Mental status   Alert and oriented; intact memory with no confusion, speech or language problems; no hallucinations or delusions     Cranial nerves   II - visual fields intact to confrontation                                                III, IV, VI - extra-ocular muscles full: no pupillary defect; no AJITH, no nystagmus, no ptosis                                                                      V - normal facial sensation                                                               VII - normal facial symmetry                                                             VIII - intact hearing IX, X - symmetrical palate                                                                  XI - symmetrical shoulder shrug                                                       XII - midline tongue without atrophy or fasciculation     Motor function  Normal muscle bulk and tone; normal power 5/5, including fine motor movements     Sensory function Intact to touch, pin, vibration, proprioception     Cerebellar Intact fine motor movement. No involuntary movements or tremors     Reflex function Intact 2+ DTR and symmetric. Negative Babinski     Gait                  Not tested       Lab Results   Component Value Date    LDLCHOLESTEROL 96 09/08/2022      No components found for: CHLPL   Lab Results   Component Value Date    TRIG 122 09/08/2022    TRIG 97 09/09/2021    TRIG 95 10/27/2020      Lab Results   Component Value Date     09/08/2022     09/09/2021     10/27/2020      No results found for: LDLCALC   No results found for: LABVLDL   Lab Results   Component Value Date    LABA1C 5.1 09/08/2022      Lab Results   Component Value Date     09/08/2022      Lab Results   Component Value Date    FAKIHTZI01 >2000 (H) 09/08/2022        Neurological work up:  Study Finding   CT head  10/10/21 Diffuse slowing   CTA Head and neck    MRI brain    2 D Echo    JOHANNE    EEG      Assessment and Recommendations:   Headache disorder  Patient with history of hypertension, left arm numbness and tingling    I would give IV magnesium 1 g with IV Reglan 10 mg and IV Decadron 4 mg x 1. Patient has had a recent MRI scan of the brain 3 weeks ago for similar symptoms. MRI scan was normal.  At this time, we will hold off on imaging study unless symptoms do not improve. Given patient's advanced age and headache, I will also check for ESR and CRP to rule out temporal arteritis. We will follow. Thank you for the consult.     Susan Edmond MD  Neurology    This note is created with the assistance of a speech-recognition program. While intending to generate a document that actually reflects the content of the visit, the document can still have some errors including those of syntax and sound a- like substitutions which may escape proofreading. In such instances, actual meaning can be extrapolated by contextual derivation.

## 2023-02-27 NOTE — PLAN OF CARE
Problem: Discharge Planning  Goal: Discharge to home or other facility with appropriate resources  Outcome: Progressing  Flowsheets (Taken 2/27/2023 0930)  Discharge to home or other facility with appropriate resources:   Identify barriers to discharge with patient and caregiver   Arrange for needed discharge resources and transportation as appropriate   Identify discharge learning needs (meds, wound care, etc)   Refer to discharge planning if patient needs post-hospital services based on physician order or complex needs related to functional status, cognitive ability or social support system     Problem: ABCDS Injury Assessment  Goal: Absence of physical injury  Outcome: Progressing  Flowsheets (Taken 2/27/2023 1138)  Absence of Physical Injury: Implement safety measures based on patient assessment     Problem: Pain  Goal: Verbalizes/displays adequate comfort level or baseline comfort level  Outcome: Progressing  Flowsheets (Taken 2/27/2023 1138)  Verbalizes/displays adequate comfort level or baseline comfort level:   Encourage patient to monitor pain and request assistance   Assess pain using appropriate pain scale   Administer analgesics based on type and severity of pain and evaluate response   Implement non-pharmacological measures as appropriate and evaluate response   Notify Licensed Independent Practitioner if interventions unsuccessful or patient reports new pain     Problem: Neurosensory - Adult  Goal: Achieves stable or improved neurological status  Outcome: Progressing  Flowsheets (Taken 2/27/2023 1138)  Achieves stable or improved neurological status:   Assess for and report changes in neurological status   Initiate measures to prevent increased intracranial pressure   Maintain blood pressure and fluid volume within ordered parameters to optimize cerebral perfusion and minimize risk of hemorrhage   Monitor temperature, glucose, and sodium.  Initiate appropriate interventions as ordered     Problem: Neurosensory - Adult  Goal: Achieves maximal functionality and self care  Outcome: Progressing  Flowsheets (Taken 2/27/2023 1138)  Achieves maximal functionality and self care: Encourage and assist patient to increase activity and self care with guidance from physical therapy/occupational therapy     Problem: Respiratory - Adult  Goal: Achieves optimal ventilation and oxygenation  Outcome: Progressing  Flowsheets (Taken 2/27/2023 1138)  Achieves optimal ventilation and oxygenation:   Assess for changes in respiratory status   Assess for changes in mentation and behavior   Position to facilitate oxygenation and minimize respiratory effort   Oxygen supplementation based on oxygen saturation or arterial blood gases   Encourage broncho-pulmonary hygiene including cough, deep breathe, incentive spirometry   Assess and instruct to report shortness of breath or any respiratory difficulty   Respiratory therapy support as indicated     Problem: Cardiovascular - Adult  Goal: Maintains optimal cardiac output and hemodynamic stability  Outcome: Progressing  Flowsheets (Taken 2/27/2023 1138)  Maintains optimal cardiac output and hemodynamic stability:   Monitor blood pressure and heart rate   Monitor urine output and notify Licensed Independent Practitioner for values outside of normal range   Assess for signs of decreased cardiac output   Administer fluid and/or volume expanders as ordered   Administer vasoactive medications as ordered     Problem: Metabolic/Fluid and Electrolytes - Adult  Goal: Electrolytes maintained within normal limits  Outcome: Progressing  Flowsheets (Taken 2/27/2023 1138)  Electrolytes maintained within normal limits:   Monitor labs and assess patient for signs and symptoms of electrolyte imbalances   Administer electrolyte replacement as ordered   Monitor response to electrolyte replacements, including repeat lab results as appropriate     Problem: Metabolic/Fluid and Electrolytes - Adult  Goal: Hemodynamic stability and optimal renal function maintained  Outcome: Progressing  Flowsheets (Taken 2/27/2023 1135)  Hemodynamic stability and optimal renal function maintained:   Monitor labs and assess for signs and symptoms of volume excess or deficit   Monitor intake, output and patient weight   Monitor urine specific gravity, serum osmolarity and serum sodium as indicated or ordered   Monitor response to interventions for patient's volume status, including labs, urine output, blood pressure (other measures as available)   Encourage oral intake as appropriate

## 2023-02-27 NOTE — PROGRESS NOTES
NEPHROLOGY PROGRESS NOTE      ASSESSMENT     Presented with complaints of dizziness/tingling left upper extremity and noted to have uncontrolled hypertension with the renal insufficiency prompting nephrology consultation. 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 Doubt secondary etiology though work-up in progress  #3 dyslipidemia  #4 history of CVA    PLAN     #1 agree to antihypertensive medication regimen adjustment as per primary  #2 follow-up pending serologies including renal artery Dopplers  #3 Will follow      SUBJECTIVE     Blood pressure controlled much better in fact on the lower side. Few antihypertensive medications adjusted by primary. Agreeable. No acute hemodynamic issues overnight  She is asymptomatic  Renal function at baseline  Pending serologies and renal artery Doppler studies    OBJECTIVE     Vitals:    02/26/23 2115 02/26/23 2358 02/27/23 0426 02/27/23 0746   BP: (!) 113/54 (!) 107/43 (!) 92/52 (!) 116/54   Pulse: 69 65 59 69   Resp:  18 18 16   Temp:  97.1 °F (36.2 °C) 97.9 °F (36.6 °C) 97.5 °F (36.4 °C)   TempSrc:  Oral Oral Oral   SpO2:  95% 94% 96%   Weight:       Height:         24HR INTAKE/OUTPUT:    Intake/Output Summary (Last 24 hours) at 2/27/2023 1007  Last data filed at 2/27/2023 6451  Gross per 24 hour   Intake 240 ml   Output 425 ml   Net -185 ml       General appearance:Awake, alert, in no acute distress  HEENT: PERRLA  Respiratory::vesicular breath sounds,no wheeze/crackles  Cardiovascular:S1 S2 normal,no gallop or organic murmur. Abdomen:Non tender/non distended. Bowel sounds present  Extremities: No Cyanosis or Clubbing,Lower extremity edema  Neurological:Alert and oriented. No abnormalities of mood, affect, memory, mentation, or behavior are noted      MEDICATIONS     Scheduled Meds:    [START ON 2/28/2023] lisinopril  20 mg Oral Daily    atorvastatin  40 mg Oral Daily    [Held by provider] carvedilol  25 mg Oral BID    NIFEdipine  30 mg Oral Daily    clopidogrel  75 mg Oral Daily    gabapentin  100 mg Oral Nightly    pantoprazole  20 mg Oral QAM AC     Continuous Infusions:   PRN Meds:  meclizine, acetaminophen, ondansetron, zolpidem, hydrALAZINE  Home Meds:                Medications Prior to Admission: cloNIDine (CATAPRES-TTS-1) 0.1 MG/24HR PTWK, Place 1 patch onto the skin every 7 days  carvedilol (COREG) 25 MG tablet, Take 1 tablet by mouth 2 times daily  meclizine (ANTIVERT) 12.5 MG tablet, TAKE 1 TABLET BY MOUTH AT NIGHT  AS NEEDED FOR DIZZINESS  lisinopril (PRINIVIL;ZESTRIL) 20 MG tablet, TAKE 1 (ONE) TABLET (20 MG TOTAL) BY MOUTH EVERY MORNING . Calcium Carbonate-Vitamin D (OYSTER SHELL CALCIUM/D) 500-5 MG-MCG TABS, Take 1 tablet by mouth daily  vitamin D3 (CHOLECALCIFEROL) 10 MCG (400 UNIT) TABS tablet, Take by mouth daily  gabapentin (NEURONTIN) 100 MG capsule, Take 1 capsule by mouth daily for 90 days.   atorvastatin (LIPITOR) 40 MG tablet, TAKE 1 TABLET BY MOUTH  DAILY  pantoprazole (PROTONIX) 20 MG tablet, TAKE 1 TABLET BY MOUTH  DAILY  clopidogrel (PLAVIX) 75 MG tablet, TAKE 1 TABLET BY MOUTH  DAILY  denosumab (PROLIA) 60 MG/ML SOSY SC injection, Inject 1 mL into the skin once for 1 dose  Cyanocobalamin (B-12) 1000 MCG SUBL, Place 1 tablet under the tongue daily (Patient taking differently: Place 500 mcg under the tongue daily)  BABY ASPIRIN PO, Take by mouth Indications: take 3 times a week ( she stopped and I have asked she resume as it was recommended by Cardio)  (Patient not taking: Reported on 2/25/2023)  Calcium Carbonate-Vit D-Min (CALCIUM 1200) 2920-6822 MG-UNIT CHEW, Take by mouth (Patient not taking: Reported on 2/25/2023)    INVESTIGATIONS     Last 3 CMP:    Recent Labs     02/25/23  1021 02/26/23  0531 02/26/23  1538 02/27/23  0514    139  --  141   K 4.3 4.4  --  4.7    102  --  102   CO2 29 28  --  28 BUN 15 18  --  28*   CREATININE 0.95* 0.99*  --  1.05*   CALCIUM 9.7 9.2  --  8.9   PROT  --   --  5.4*  --        Last 3 CBC:  Recent Labs     02/25/23  1021   WBC 4.2   RBC 3.51*   HGB 10.9*   HCT 34.8*   MCV 99.1   MCH 31.1   MCHC 31.3   RDW 12.6      MPV 10.3       Please do not hesitate to call with questions    This note is created with the assistance of a speech-recognition program. While intending to generate a document that actually reflects the content of the visit, no guarantees can be provided that every mistake has been identified and corrected by editing    Shaheed Rod MD MD, MRCP Mark Vo, Lucio Ray   2/27/2023 10:07 AM  [facility]

## 2023-02-27 NOTE — PROGRESS NOTES
Progress Note  Date:2023       Room:  Patient Ardeen Bernheim     Date of Birth:     Age:82 y.o. Subjective    Subjective:  Symptoms:  She reports headache and anxiety. No shortness of breath, cough, chest pain, weakness or diarrhea. (Increased HA and dizziness per patient- states she does not want to be discharged till she feels better. BP is under control but low normal.  Meds adjusted- catapress patch was D/C ed last night and will D/C prn hydralazine. Cont coreg but dose may be reduced if BP continues to be low, lisinopril will be reduced to 20 mg as well. ). Diet:  Adequate intake. No nausea or vomiting. Activity level: Returning to normal.    Pain:  She complains of pain that is moderate. She reports pain is unchanged. Review of Systems   Constitutional:  Negative for chills and fever. HENT:  Negative for congestion, ear pain, hearing loss, nosebleeds, sore throat and tinnitus. Eyes:  Negative for photophobia, pain, discharge and redness. Respiratory:  Negative for cough, shortness of breath and stridor. Cardiovascular:  Negative for chest pain, palpitations and leg swelling. Gastrointestinal:  Negative for abdominal pain, blood in stool, constipation, diarrhea, nausea and vomiting. Endocrine: Negative for polydipsia. Genitourinary:  Negative for dysuria, flank pain, frequency, hematuria and urgency. Musculoskeletal:  Negative for back pain, myalgias and neck pain. Skin:  Negative for rash. Allergic/Immunologic: Negative for environmental allergies. Neurological:  Positive for dizziness and headaches. Negative for tremors, seizures and weakness. Hematological:  Does not bruise/bleed easily. Psychiatric/Behavioral:  Negative for hallucinations and suicidal ideas. The patient is nervous/anxious.     Objective         Vitals Last 24 Hours:  TEMPERATURE:  Temp  Av.8 °F (36.6 °C)  Min: 97.1 °F (36.2 °C)  Max: 98.4 °F (36.9 °C)  RESPIRATIONS RANGE: Resp  Av.5  Min: 16  Max: 18  PULSE OXIMETRY RANGE: SpO2  Av.5 %  Min: 93 %  Max: 96 %  PULSE RANGE: Pulse  Av.3  Min: 59  Max: 69  BLOOD PRESSURE RANGE: Systolic (50BWS), NFI:722 , Min:92 , FSB:101   ; Diastolic (57KMD), ZFQ:32, Min:43, Max:54    I/O (24Hr): Intake/Output Summary (Last 24 hours) at 2023 1247  Last data filed at 2023 0955  Gross per 24 hour   Intake 240 ml   Output 425 ml   Net -185 ml     Objective:  General Appearance:  Comfortable. Vital signs: (most recent): Blood pressure (!) 128/51, pulse 65, temperature 98.2 °F (36.8 °C), temperature source Oral, resp. rate 17, height 5' (1.524 m), weight 110 lb (49.9 kg), SpO2 96 %. Vital signs are normal.  No fever. Output: Producing urine and producing stool. HEENT: Normal HEENT exam.    Lungs:  Normal effort and normal respiratory rate. Breath sounds clear to auscultation. She is not in respiratory distress. No rales, wheezes or rhonchi. Heart: Normal rate. Regular rhythm. S1 normal and S2 normal.    Abdomen: Abdomen is soft. Bowel sounds are normal.   There is no abdominal tenderness. Extremities: Normal range of motion. There is no dependent edema or local swelling. Pulses: Distal pulses are intact. There are no decreased pulses. Neurological: Patient is alert and oriented to person, place and time. Pupils:  Pupils are equal, round, and reactive to light. Skin:  Warm and dry. Labs/Imaging/Diagnostics    Labs:  CBC:  Recent Labs     23  1021   WBC 4.2   RBC 3.51*   HGB 10.9*   HCT 34.8*   MCV 99.1   RDW 12.6        CHEMISTRIES:  Recent Labs     23  1021 23  0531 23  0514    139 141   K 4.3 4.4 4.7    102 102   CO2 29 28 28   BUN 15 18 28*   CREATININE 0.95* 0.99* 1.05*   GLUCOSE 94 95 96   MG 1.7  --   --      PT/INR:No results for input(s): PROTIME, INR in the last 72 hours. APTT:No results for input(s):  APTT in the last 72 hours. LIVER PROFILE:No results for input(s): AST, ALT, BILIDIR, BILITOT, ALKPHOS in the last 72 hours. Imaging Last 24 Hours:  US RENAL COMPLETE    Result Date: 2/27/2023  EXAMINATION: RETROPERITONEAL ULTRASOUND OF THE KIDNEYS AND URINARY BLADDER 2/27/2023 COMPARISON: None HISTORY: ORDERING SYSTEM PROVIDED HISTORY: ckd TECHNOLOGIST PROVIDED HISTORY: ckd FINDINGS: Kidneys: The right kidney measures 9.5 cm in length and the left kidney measures 11.3 cm in length. No hydronephrosis. Nonobstructing right nephrolithiasis. Simple bilateral renal cysts 2 cm on the right and 4.7 cm on the left. Bladder: Unremarkable appearance of the bladder. No significant post void residual.     Simple bilateral renal cysts. Right nephrolithiasis. No hydronephrosis. Unremarkable ultrasound urinary bladder. Assessment//Plan           Hospital Problems             Last Modified POA    * (Principal) Uncontrolled hypertension 2/25/2023 Yes    Dizziness and giddiness 2/25/2023 Yes    Vertigo 2/26/2023 Yes    Dyslipidemia 2/25/2023 Yes    Neuropathy 2/25/2023 Yes    B12 deficiency 2/25/2023 Yes    Vitamin D deficiency 2/25/2023 Yes    Gaytan's esophagus without dysplasia 2/25/2023 Yes    Overview Signed 10/18/2021 11:46 AM by Demetrius Sagastume MD     EGD done by Dr Nicolasa Ojeda in aug 2021-advised no further egd for follow up. To contiue current meds that include 20 mg Protonix daily         Gastroesophageal reflux disease 2/25/2023 Yes    Essential hypertension 2/25/2023 Yes    Age-related osteoporosis without current pathological fracture 2/25/2023 Yes    SARAH (generalized anxiety disorder) 2/25/2023 Yes    Other insomnia 2/25/2023 Yes    Stage 3a chronic kidney disease ([address] 2/25/2023 Yes     Assessment:    Condition: In serious condition. Improving. (Uncontrolled HTN- improved  CKD- work up in progress  SARAH  GERD/Barettes esophagus  Vit D an dB12 def  Insomnia  Vertigo By Hx).      Plan:   Ad fede activity and per physical therapy. Start/continue incentive spirometry. Consults: neurology (nephrology). Advance diet as tolerated and restricted diet. Administer medications as ordered. (Reduce dose of lisinopril to 20 mg daily  D/C catapress  D/C Hydrallazine  Hold coreg and adjust dose down if needed  Cont Procardia- patient has good response  Neuro consulted due to patient c/o worsening HA and dizziness  Nephrology work up in progress).      Electronically signed by Sal Sequeira MD on 2/27/23 at 12:47 PM EST

## 2023-02-28 VITALS
OXYGEN SATURATION: 94 % | DIASTOLIC BLOOD PRESSURE: 67 MMHG | HEART RATE: 84 BPM | TEMPERATURE: 97.5 F | RESPIRATION RATE: 18 BRPM | WEIGHT: 110 LBS | HEIGHT: 60 IN | BODY MASS INDEX: 21.6 KG/M2 | SYSTOLIC BLOOD PRESSURE: 144 MMHG

## 2023-02-28 LAB
ANION GAP SERPL CALCULATED.3IONS-SCNC: 9 MMOL/L (ref 9–17)
BUN SERPL-MCNC: 25 MG/DL (ref 8–23)
BUN/CREAT BLD: 22 (ref 9–20)
CALCIUM SERPL-MCNC: 9.5 MG/DL (ref 8.6–10.4)
CHLORIDE SERPL-SCNC: 104 MMOL/L (ref 98–107)
CO2 SERPL-SCNC: 28 MMOL/L (ref 20–31)
CREAT SERPL-MCNC: 1.15 MG/DL (ref 0.5–0.9)
GFR SERPL CREATININE-BSD FRML MDRD: 48 ML/MIN/1.73M2
GLUCOSE BLD-MCNC: 127 MG/DL (ref 65–105)
GLUCOSE SERPL-MCNC: 126 MG/DL (ref 70–99)
POTASSIUM SERPL-SCNC: 5.1 MMOL/L (ref 3.7–5.3)
SODIUM SERPL-SCNC: 141 MMOL/L (ref 135–144)

## 2023-02-28 PROCEDURE — 82947 ASSAY GLUCOSE BLOOD QUANT: CPT

## 2023-02-28 PROCEDURE — 99232 SBSQ HOSP IP/OBS MODERATE 35: CPT | Performed by: PSYCHIATRY & NEUROLOGY

## 2023-02-28 PROCEDURE — 99239 HOSP IP/OBS DSCHRG MGMT >30: CPT | Performed by: FAMILY MEDICINE

## 2023-02-28 PROCEDURE — 36415 COLL VENOUS BLD VENIPUNCTURE: CPT

## 2023-02-28 PROCEDURE — 80048 BASIC METABOLIC PNL TOTAL CA: CPT

## 2023-02-28 PROCEDURE — 6370000000 HC RX 637 (ALT 250 FOR IP): Performed by: FAMILY MEDICINE

## 2023-02-28 RX ORDER — NIFEDIPINE 30 MG/1
30 TABLET, EXTENDED RELEASE ORAL DAILY
Qty: 30 TABLET | Refills: 3 | Status: SHIPPED | OUTPATIENT
Start: 2023-03-01

## 2023-02-28 RX ORDER — MECLIZINE HCL 25MG 25 MG/1
25 TABLET, CHEWABLE ORAL 3 TIMES DAILY PRN
Qty: 90 TABLET | Refills: 0 | Status: SHIPPED | OUTPATIENT
Start: 2023-02-28

## 2023-02-28 RX ADMIN — LISINOPRIL 20 MG: 20 TABLET ORAL at 07:40

## 2023-02-28 RX ADMIN — PANTOPRAZOLE SODIUM 20 MG: 20 TABLET, DELAYED RELEASE ORAL at 06:23

## 2023-02-28 RX ADMIN — NIFEDIPINE 30 MG: 30 TABLET, FILM COATED, EXTENDED RELEASE ORAL at 07:40

## 2023-02-28 RX ADMIN — MECLIZINE HYDROCHLORIDE 25 MG: 25 TABLET, CHEWABLE ORAL at 09:48

## 2023-02-28 RX ADMIN — ATORVASTATIN CALCIUM 40 MG: 40 TABLET, FILM COATED ORAL at 07:40

## 2023-02-28 RX ADMIN — CLOPIDOGREL BISULFATE 75 MG: 75 TABLET ORAL at 07:40

## 2023-02-28 NOTE — PROGRESS NOTES
Renal Progress Note    Patient :  Miryam Weiss; 82 y.o. MRN# [id number]  Location:  2003/2003-02  Attending:  Maricarmen Salinas MD  Admit Date:  2/25/2023   Hospital Day: 3    Subjective:     Patient was seen and examined.  No new issues reported overnight.  Currently complaining of some flushing and shortness of breath but otherwise doing okay.  BMP results from today reviewed electrolytes stable with sodium 141, potassium 5.1, chloride 104, bicarb 28, calcium 9.5, BUN 25, creatinine 1.15 mg/dl.  Baseline creatinine seems to be 0.9-1.1 mg/dl.  Urine output documented as about 525 cc in the last 24 hours.    Most recent blood pressure 144/67.  Heart rate has been high 60s to low 70s.  Blood pressure medications were adjusted by primary.  Patient is currently on lisinopril 20 mg daily, nifedipine 30 mg daily.  Coreg and clonidine on hold.    Serology 2/26/2023 SUKH, ANCA, SPEP, immunofixation, complement levels all negative.  K/L 1.64.  Renal artery Doppler 2/27/2023 was negative for any renal artery stenosis.  Renal ultrasound 2/27/2023: Right kidney 9.5 cm left kidney 9.3 cm.  Impression: Simple bilateral renal cyst.  Right nephrolithiasis, nonobstructing.  No hydronephrosis.  Unremarkable ultrasound urinary bladder.  Outpatient Medications:     Medications Prior to Admission: [DISCONTINUED] cloNIDine (CATAPRES-TTS-1) 0.1 MG/24HR PTWK, Place 1 patch onto the skin every 7 days  [DISCONTINUED] carvedilol (COREG) 25 MG tablet, Take 1 tablet by mouth 2 times daily  [DISCONTINUED] meclizine (ANTIVERT) 12.5 MG tablet, TAKE 1 TABLET BY MOUTH AT NIGHT  AS NEEDED FOR DIZZINESS  lisinopril (PRINIVIL;ZESTRIL) 20 MG tablet, TAKE 1 (ONE) TABLET (20 MG TOTAL) BY MOUTH EVERY MORNING .  Calcium Carbonate-Vitamin D (OYSTER SHELL CALCIUM/D) 500-5 MG-MCG TABS, Take 1 tablet by mouth daily  vitamin D3 (CHOLECALCIFEROL) 10 MCG (400 UNIT) TABS tablet, Take by mouth daily  gabapentin (NEURONTIN) 100 MG capsule, Take 1 capsule by mouth daily  for 90 days.  atorvastatin (LIPITOR) 40 MG tablet, TAKE 1 TABLET BY MOUTH  DAILY  pantoprazole (PROTONIX) 20 MG tablet, TAKE 1 TABLET BY MOUTH  DAILY  clopidogrel (PLAVIX) 75 MG tablet, TAKE 1 TABLET BY MOUTH  DAILY  denosumab (PROLIA) 60 MG/ML SOSY SC injection, Inject 1 mL into the skin once for 1 dose  Cyanocobalamin (B-12) 1000 MCG SUBL, Place 1 tablet under the tongue daily  BABY ASPIRIN PO, Take by mouth Indications: take 3 times a week ( she stopped and I have asked she resume as it was recommended by Cardio)  (Patient not taking: Reported on 2023)  [DISCONTINUED] Calcium Carbonate-Vit D-Min (CALCIUM 1200) 9223-1417 MG-UNIT CHEW, Take by mouth (Patient not taking: Reported on 2023)    Current Medications:     Scheduled Meds:    lisinopril  20 mg Oral Daily    atorvastatin  40 mg Oral Daily    [Held by provider] carvedilol  25 mg Oral BID    NIFEdipine  30 mg Oral Daily    clopidogrel  75 mg Oral Daily    gabapentin  100 mg Oral Nightly    pantoprazole  20 mg Oral QAM AC     Continuous Infusions:   PRN Meds:  meclizine, acetaminophen, ondansetron, zolpidem    Input/Output:       I/O last 3 completed shifts:  In: 1180 [P.O.:1080; I.V.:100]  Out: 725 [Urine:725].    Patient Vitals for the past 96 hrs (Last 3 readings):   Weight   23 0915 110 lb (49.9 kg)       Vital Signs:   Temperature:  Temp: 98.1 °F (36.7 °C)  TMax:   Temp (24hrs), Av.8 °F (36.6 °C), Min:97.7 °F (36.5 °C), Max:98.1 °F (36.7 °C)    Respirations:  Resp: 16  Pulse:   Heart Rate: 76  BP:    BP: (!) 144/67  BP Range: Systolic (24hrs), Av , Min:121 , Max:144       Diastolic (24hrs), Av, Min:51, Max:67      Physical Examination:     General:  AAO x 3, speaking in full sentences, no accessory muscle use.  HEENT: Atraumatic, normocephalic, no throat congestion, moist mucosa.  Eyes:   Pupils equal, round and reactive to light, EOMI.  Neck:   Supple  Chest:   Bilateral vesicular breath sounds, no rales or wheezes.  Cardiac:  S1 S2 RR, no murmurs, gallops or rubs. Abdomen: Soft, non-tender, no masses or organomegaly, BS audible. :   No suprapubic or flank tenderness. Neuro:  AAO x 3, No FND. SKIN:  No rashes, good skin turgor. Extremities:  No edema. Labs:     No results for input(s): WBC, RBC, HGB, HCT, MCV, MCH, MCHC, RDW, PLT, MPV in the last 72 hours. BMP:   Recent Labs     02/26/23  0531 02/27/23  0514 02/28/23  0543    141 141   K 4.4 4.7 5.1    102 104   CO2 28 28 28   BUN 18 28* 25*   CREATININE 0.99* 1.05* 1.15*   GLUCOSE 95 96 126*   CALCIUM 9.2 8.9 9.5      SUKH:      Lab Results   Component Value Date/Time    SUKH NEGATIVE 02/26/2023 03:38 PM     SPEP:  Lab Results   Component Value Date/Time    PROT 5.4 02/26/2023 03:38 PM    ALBCAL 3.7 02/26/2023 03:38 PM    ALBPCT 69 02/26/2023 03:38 PM    LABALPH 0.2 02/26/2023 03:38 PM    LABALPH 0.7 02/26/2023 03:38 PM    A1PCT 3 02/26/2023 03:38 PM    A2PCT 13 02/26/2023 03:38 PM    LABBETA 0.5 02/26/2023 03:38 PM    BETAPCT 9 02/26/2023 03:38 PM    GAMGLOB 0.3 02/26/2023 03:38 PM    GGPCT 6 02/26/2023 03:38 PM    PATH  02/26/2023 09:11 PM     Reviewed by pathologist:  Yony Fernandes.  Zakia Hernadez D.O.     UPEP:     Lab Results   Component Value Date/Time    LABPE NORMAL ELECTROPHORETIC PATTERN 02/26/2023 09:11 PM     C3:     Lab Results   Component Value Date/Time    C3 121 02/26/2023 03:38 PM     C4:     Lab Results   Component Value Date/Time    C4 25 02/26/2023 03:38 PM     MPO ANCA:     Lab Results   Component Value Date/Time    MPO <0.3 02/26/2023 03:38 PM     PR3 ANCA:     Lab Results   Component Value Date/Time    PR3 <0.7 02/26/2023 03:38 PM     Urinalysis/Chemistries:      Lab Results   Component Value Date/Time    NITRU NEGATIVE 02/26/2023 09:11 PM    COLORU Yellow 02/26/2023 09:11 PM    PHUR 5.0 02/26/2023 09:11 PM    WBCUA 0 TO 2 02/26/2023 09:11 PM    RBCUA None 02/26/2023 09:11 PM    BACTERIA FEW 02/26/2023 09:11 PM    SPECGRAV 1.025 02/26/2023 09:11 PM LEUKOCYTESUR TRACE 02/26/2023 09:11 PM    UROBILINOGEN Normal 02/26/2023 09:11 PM    BILIRUBINUR NEGATIVE 02/26/2023 09:11 PM    GLUCOSEU NEGATIVE 02/26/2023 09:11 PM    KETUA TRACE 02/26/2023 09:11 PM     Urine Sodium:     Lab Results   Component Value Date/Time    GAVIN <20 02/26/2023 09:11 PM     Urine Creatinine:     Lab Results   Component Value Date/Time    LABCREA 200.5 02/26/2023 09:11 PM     Radiology:     Reviewed.     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  Hypertensive urgency.  Essential hypertension uncontrolled likely due to noncompliance.  Currently secondary work-up has been negative, some results still pending.  History of CVA.  Hyperlipidemia.  History of peptic ulcer bleeding.    Plan:   Agree with antihypertensive medication changes as per primary.  To follow-up remaining blood work for secondary hypertension as outpatient.  Nurse will be messaging primary about Flushing and shortness of breath.  Okay to discharge from nephrology standpoint.  Patient should get BMP in 1 week and results faxed to Dr. King at [phone].  Follow-up with Dr. King in 3 to 4 weeks postdischarge.  Since renal function is stable.  Blood pressure much improved, nephrology will sign off.  Please call with any other questions.    Nutrition   Please ensure that patient is on a renal diet/TF. Avoid nephrotoxic drugs/contrast exposure.    Vitaliy Wheat MD  [facility]     This note is created with the assistance of a speech-recognition program. While intending to generate a document that actually reflects the content of the visit, no guarantees can be provided that every mistake has been identified and corrected by editing.

## 2023-02-28 NOTE — PLAN OF CARE
Problem: Discharge Planning  Goal: Discharge to home or other facility with appropriate resources  2/28/2023 1151 by Atul Fleming RN  Outcome: Adequate for Discharge     Problem: Pain  Goal: Verbalizes/displays adequate comfort level or baseline comfort level  2/28/2023 1151 by Atul Fleming RN  Outcome: Adequate for Discharge     Problem: Neurosensory - Adult  Goal: Achieves stable or improved neurological status  2/28/2023 1151 by Atul Fleming RN  Outcome: Adequate for Discharge     Problem: Neurosensory - Adult  Goal: Achieves maximal functionality and self care  2/28/2023 1151 by Atul Fleming RN  Outcome: Adequate for Discharge     Problem: Respiratory - Adult  Goal: Achieves optimal ventilation and oxygenation  2/28/2023 1151 by Atul Fleming RN  Outcome: Adequate for Discharge     Problem: Cardiovascular - Adult  Goal: Maintains optimal cardiac output and hemodynamic stability  2/28/2023 1151 by Atul Fleming RN  Outcome: Adequate for Discharge     Problem: Metabolic/Fluid and Electrolytes - Adult  Goal: Electrolytes maintained within normal limits  2/28/2023 1151 by Atul Fleming RN  Outcome: Adequate for Discharge     Problem: Metabolic/Fluid and Electrolytes - Adult  Goal: Hemodynamic stability and optimal renal function maintained  2/28/2023 1151 by Atul Fleming RN  Outcome: Adequate for Discharge

## 2023-02-28 NOTE — PROGRESS NOTES
[facility] ([facility]) Neurology Specialist  [address]  [facility], [address]  [place] [address]:  [phone] or [phone]  FAX:  [phone]            Brief history: Joanna Dia is a 81 y.o. old female admitted on 2/25/2023 with hypertensive urgency and headaches. Subjective: No new neurological events overnight. Patient denies any new weakness, numbness, tingling. Patient reports improvement in the headaches however continues to report 5/10 headache. No new or other focal neurological finding.      Objective: BP (!) 137/56   Pulse 76   Temp 98.1 °F (36.7 °C) (Oral)   Resp 16   Ht 5' (1.524 m)   Wt 110 lb (49.9 kg)   SpO2 94%   BMI 21.48 kg/m²       Medications:    lisinopril  20 mg Oral Daily    atorvastatin  40 mg Oral Daily    [Held by provider] carvedilol  25 mg Oral BID    NIFEdipine  30 mg Oral Daily    clopidogrel  75 mg Oral Daily    gabapentin  100 mg Oral Nightly    pantoprazole  20 mg Oral QAM AC          Neurological examination:    Mental status   Alert and oriented; intact memory with no confusion, speech or language problems; no hallucinations or delusions     Cranial nerves   II - visual fields intact to confrontation                                                III, IV, VI - extra-ocular muscles full: no pupillary defect; no AJITH, no nystagmus, no ptosis                                                                      V - normal facial sensation                                                               VII - normal facial symmetry                                                             VIII - intact hearing                                                                             IX, X - symmetrical palate                                                                  XI - symmetrical shoulder shrug                                                       XII - midline tongue without atrophy or fasciculation     Motor function  Normal muscle bulk and tone; normal power 5/5, including fine motor movements     Sensory function Intact to touch, pin, vibration, proprioception     Cerebellar Intact fine motor movement. No involuntary movements or tremors     Reflex function Intact 2+ DTR and symmetric. Negative Babinski     Gait                  Not tested       Lab Results   Component Value Date    LDLCHOLESTEROL 96 09/08/2022      No components found for: CHLPL   Lab Results   Component Value Date    TRIG 122 09/08/2022    TRIG 97 09/09/2021    TRIG 95 10/27/2020      Lab Results   Component Value Date     09/08/2022     09/09/2021     10/27/2020      No results found for: LDLCALC   No results found for: LABVLDL   Lab Results   Component Value Date    LABA1C 5.1 09/08/2022      Lab Results   Component Value Date     09/08/2022      Lab Results   Component Value Date    [id number] >2000 (H) 09/08/2022        Neurological work up:  Study Finding   CT head  10/10/21 Diffuse slowing   CTA Head and neck    MRI brain    2 D Echo    JOHANNE    EEG      Assessment and Recommendations:   Headache disorder  Patient with history of hypertension, left arm numbness and tingling    The patient reports improvement in the headaches with yesterday's medications including magnesium, Reglan and Decadron. ESR and CRP are unremarkable. The patient reports continued occipital pain which could be from cervical DJD exacerbated by occipital neuralgia. If symptoms persist, outpatient, she would benefit from cervical CHANDAN to help with the pain. I will also consider patient getting occipital nerve block    Recent MRI scan of the brain 3 weeks ago has been unremarkable. At this time, I do not see necessity to obtain repeat imaging study    The patient is neurologically stable to be discharged from the hospital with outpatient follow-up in next 4 to 6 weeks.       Bipin Diallo MD  Neurology    This note is created with the assistance of a speech-recognition program. While intending to generate a document that actually reflects the content of the visit, the document can still have some errors including those of syntax and sound a- like substitutions which may escape proofreading. In such instances, actual meaning can be extrapolated by contextual derivation.

## 2023-02-28 NOTE — DISCHARGE SUMMARY
Discharge Summary    Date: 2/28/2023  Patient Name: Debbi Hampton    YOB: 1940     Age: 81 y.o. Admit Date: 2/25/2023  Discharge Date: 2/28/2023  Discharge Condition: Stable    Admission Diagnosis  Hypertensive urgency [I16.0]; Uncontrolled hypertension [I10]      Discharge Diagnosis  Principal Problem:    Uncontrolled hypertension  Active Problems:    Dizziness and giddiness    Vertigo    Hypertensive urgency    Dyslipidemia    Neuropathy    B12 deficiency    Vitamin D deficiency    Gaytan's esophagus without dysplasia    Gastroesophageal reflux disease    Essential hypertension    Age-related osteoporosis without current pathological fracture    SARAH (generalized anxiety disorder)    Other insomnia    Stage 3a chronic kidney disease ([facility] [address]  Resolved Problems:    * No resolved hospital problems. [facility] Stay  Narrative of Hospital Course:  Improved after Starting procardia XL 30 mg, tolerated this with no evidence of swelling of LE as she did with amlodipine. Coreg was placed on hold as BP was low. Lisinopril dose initially was increased to 40 mg from 30 and then reduced to 20 mg as her BP was low normal.Renal function slightly worse since hospitalization, will monitor as OP. Neuro and nephrology work up so far neg. Will follow up as OP as adviced    Consultants:  IP CONSULT TO HOSPITALIST  IP CONSULT TO NEPHROLOGY  IP CONSULT TO NEPHROLOGY  IP CONSULT TO NEUROLOGY    Surgeries/procedures Performed:  NA     Treatments:    Analgesia, Cardiac Medications and Therapies    Other, Ace Inhibitor, Beta-Blocker and Calcium Channel Blocker    Discharge Plan/Disposition:  Home    Hospital/Incidental Findings Requiring Follow Up:    Patient Instructions:    Diet: Encourage Fluids, Low Fat, Low Cholesterol Diet and Cardiac Diet    Activity:Activity as Tolerated  For number of days (if applicable): Other Instructions: Follow up with PCP in 3-6 days    Provider Follow-Up:   No follow-ups on file. Significant Diagnostic Studies:    Recent Labs:  Admission on 02/25/2023  Glucose                                       Date: 02/25/2023  Value: 94          Ref range: 70 - 99 mg/dL      Status: Final  BUN                                           Date: 02/25/2023  Value: 15          Ref range: 8 - 23 mg/dL       Status: Final  Creatinine                                    Date: 02/25/2023  Value: 0.95 (A)    Ref range: 0.50 - 0.90 mg/dL  Status: Final  Est, Glom Filt Rate                           Date: 02/25/2023  Value: >60         Ref range: >60 mL/min/1.73m2  Status: Final                Comment:       These results are not intended for use in patients <25years of age. eGFR results are calculated without a race factor using the 2021 CKD-EPI equation. Careful clinical correlation is recommended, particularly when comparing to results   calculated using previous equations. The CKD-EPI equation is less accurate in patients with extremes of muscle mass, extra-renal   metabolism of creatine, excessive creatine ingestion, or following therapy that affects   renal tubular secretion.     Bun/Cre Ratio                                 Date: 02/25/2023  Value: 16          Ref range: 9 - 20             Status: Final  Calcium                                       Date: 02/25/2023  Value: 9.7         Ref range: 8.6 - 10.4 mg/dL   Status: Final  Sodium                                        Date: 02/25/2023  Value: 141         Ref range: 135 - 144 mmol/L   Status: Final  Potassium                                     Date: 02/25/2023  Value: 4.3         Ref range: 3.7 - 5.3 mmol/L   Status: Final  Chloride                                      Date: 02/25/2023  Value: 103         Ref range: 98 - 107 mmol/L    Status: Final  CO2                                           Date: 02/25/2023  Value: 29          Ref range: 20 - 31 mmol/L     Status: Final  Anion Gap                                     Date: 02/25/2023  Value: 9           Ref range: 9 - 17 mmol/L      Status: Final  WBC                                           Date: 02/25/2023  Value: 4.2         Ref range: 3.5 - 11.3 k/uL    Status: Final  RBC                                           Date: 02/25/2023  Value: 3.51 (A)    Ref range: 3.95 - 5.11 m/uL   Status: Final  Hemoglobin                                    Date: 02/25/2023  Value: 10.9 (A)    Ref range: 11.9 - 15.1 g/dL   Status: Final  Hematocrit                                    Date: 02/25/2023  Value: 34.8 (A)    Ref range: 36.3 - 47.1 %      Status: Final  MCV                                           Date: 02/25/2023  Value: 99.1        Ref range: 82.6 - 102.9 fL    Status: Final  MCH                                           Date: 02/25/2023  Value: 31.1        Ref range: 25.2 - 33.5 pg     Status: Final  MCHC                                          Date: 02/25/2023  Value: 31.3        Ref range: 28.4 - 34.8 g/dL   Status: Final  RDW                                           Date: 02/25/2023  Value: 12.6        Ref range: 11.8 - 14.4 %      Status: Final  Platelets                                     Date: 02/25/2023  Value: 159         Ref range: 138 - 453 k/uL     Status: Final  MPV                                           Date: 02/25/2023  Value: 10.3        Ref range: 8.1 - 13.5 fL      Status: Final  NRBC Automated                                Date: 02/25/2023  Value: 0.0         Ref range: 0.0 per 100 WBC    Status: Final  Seg Neutrophils                               Date: 02/25/2023  Value: 72 (A)      Ref range: 36 - 65 %          Status: Final  Lymphocytes                                   Date: 02/25/2023  Value: 18 (A)      Ref range: 24 - 43 %          Status: Final  Monocytes                                     Date: 02/25/2023  Value: 7           Ref range: 3 - 12 %           Status: Final  Eosinophils %                                 Date: 02/25/2023  Value: 2 Ref range: 1 - 4 %            Status: Final  Basophils                                     Date: 02/25/2023  Value: 1           Ref range: 0 - 2 %            Status: Final  Immature Granulocytes                         Date: 02/25/2023  Value: 0           Ref range: 0 %                Status: Final  Segs Absolute                                 Date: 02/25/2023  Value: 3.01        Ref range: 1.50 - 8.10 k/uL   Status: Final  Absolute Lymph #                              Date: 02/25/2023  Value: 0.77 (A)    Ref range: 1.10 - 3.70 k/uL   Status: Final  Absolute Mono #                               Date: 02/25/2023  Value: 0.31        Ref range: 0.10 - 1.20 k/uL   Status: Final  Absolute Eos #                                Date: 02/25/2023  Value: 0.07        Ref range: 0.00 - 0.44 k/uL   Status: Final  Basophils Absolute                            Date: 02/25/2023  Value: <0.03       Ref range: 0.00 - 0.20 k/uL   Status: Final  Absolute Immature Granulocyte                 Date: 02/25/2023  Value: 0.01        Ref range: 0.00 - 0.30 k/uL   Status: Final  Ventricular Rate                              Date: 02/25/2023  Value: 58          Ref range: BPM                Status: Final  Atrial Rate                                   Date: 02/25/2023  Value: 58          Ref range: BPM                Status: Final  P-R Interval                                  Date: 02/25/2023  Value: 138         Ref range: ms                 Status: Final  QRS Duration                                  Date: 02/25/2023  Value: 88          Ref range: ms                 Status: Final  Q-T Interval                                  Date: 02/25/2023  Value: 432         Ref range: ms                 Status: Final  QTc Calculation (Bazett)                      Date: 02/25/2023  Value: 424         Ref range: ms                 Status: Final  P Axis                                        Date: 02/25/2023  Value: 51          Ref range: degrees Status: Final  R Axis                                        Date: 02/25/2023  Value: -9          Ref range: degrees            Status: Final  T Axis                                        Date: 02/25/2023  Value: 39          Ref range: degrees            Status: Final  Magnesium                                     Date: 02/25/2023  Value: 1.7         Ref range: 1.6 - 2.6 mg/dL    Status: Final  Troponin, High Sensitivity                    Date: 02/25/2023  Value: 27 (A)      Ref range: 0 - 14 ng/L        Status: Final                Comment: High Sensitivity Troponin values cannot be compared with other Troponin methodologies. Troponin, High Sensitivity                    Date: 02/25/2023  Value: 28 (A)      Ref range: 0 - 14 ng/L        Status: Final                Comment: High Sensitivity Troponin values cannot be compared with other Troponin methodologies. Glucose                                       Date: 02/26/2023  Value: 95          Ref range: 70 - 99 mg/dL      Status: Final  BUN                                           Date: 02/26/2023  Value: 18          Ref range: 8 - 23 mg/dL       Status: Final  Creatinine                                    Date: 02/26/2023  Value: 0.99 (A)    Ref range: 0.50 - 0.90 mg/dL  Status: Final  Est, Glom Filt Rate                           Date: 02/26/2023  Value: 57 (A)      Ref range: >60 mL/min/1.73m2  Status: Final                Comment:       These results are not intended for use in patients <25years of age. eGFR results are calculated without a race factor using the 2021 CKD-EPI equation. Careful clinical correlation is recommended, particularly when comparing to results   calculated using previous equations. The CKD-EPI equation is less accurate in patients with extremes of muscle mass, extra-renal   metabolism of creatine, excessive creatine ingestion, or following therapy that affects   renal tubular secretion.     Bun/Cre Ratio Date: 02/26/2023  Value: 18          Ref range: 9 - 20             Status: Final  Calcium                                       Date: 02/26/2023  Value: 9.2         Ref range: 8.6 - 10.4 mg/dL   Status: Final  Sodium                                        Date: 02/26/2023  Value: 139         Ref range: 135 - 144 mmol/L   Status: Final  Potassium                                     Date: 02/26/2023  Value: 4.4         Ref range: 3.7 - 5.3 mmol/L   Status: Final  Chloride                                      Date: 02/26/2023  Value: 102         Ref range: 98 - 107 mmol/L    Status: Final  CO2                                           Date: 02/26/2023  Value: 28          Ref range: 20 - 31 mmol/L     Status: Final  Anion Gap                                     Date: 02/26/2023  Value: 9           Ref range: 9 - 17 mmol/L      Status: Final  Color, UA                                     Date: 02/26/2023  Value: Yellow      Ref range: Yellow             Status: Final  Turbidity UA                                  Date: 02/26/2023  Value: Clear       Ref range: Clear              Status: Final  Glucose, Ur                                   Date: 02/26/2023  Value: NEGATIVE    Ref range: NEGATIVE           Status: Final  Bilirubin Urine                               Date: 02/26/2023  Value: NEGATIVE    Ref range: NEGATIVE           Status: Final  Ketones, Urine                                Date: 02/26/2023  Value: TRACE (A)   Ref range: NEGATIVE           Status: Final  Specific [place], UA                          Date: 02/26/2023  Value: 1.025       Ref range: 1.005 - 1.030      Status: Final  Urine Hgb                                     Date: 02/26/2023  Value: NEGATIVE    Ref range: NEGATIVE           Status: Final  pH, UA                                        Date: 02/26/2023  Value: 5.0         Ref range: 5.0 - 8.0          Status: Final  Protein, UA                                   Date: 02/26/2023  Value: NEGATIVE    Ref range: NEGATIVE           Status: Final  Urobilinogen, Urine                           Date: 02/26/2023  Value: Normal      Ref range: Normal             Status: Final  Nitrite, Urine                                Date: 02/26/2023  Value: NEGATIVE    Ref range: NEGATIVE           Status: Final  Leukocyte Esterase, Urine                     Date: 02/26/2023  Value: TRACE (A)   Ref range: NEGATIVE           Status: Final  Sodium,Ur                                     Date: 02/26/2023  Value: <20         Ref range: mmol/L             Status: Final                Comment: No normal range established. Total Protein, Urine                          Date: 02/26/2023  Value: 11          Ref range: mg/dL              Status: Final                Comment: No normal range established. Creatinine, Ur                                Date: 02/26/2023  Value: 200.5       Ref range: 28.0 - 217.0 mg/*  Status: Final  Eosinophil, Ur                                Date: 02/26/2023  Value: NONE SEEN   Ref range: NONE SEEN          Status: Final  Specimen Type                                 Date: 02/26/2023  Value: RANDOM        Status: Corrected                Comment: CORRECTED ON 02/26 AT 2157: PREVIOUSLY REPORTED AS . CLEAN CATCH URINE  Urine Total Protein                           Date: 02/26/2023  Value: 11          Ref range: mg/dL              Status: Final  P E Interpretation, U                         Date: 02/26/2023  Value: NORMAL ELECTROPHORETIC PATTERN                       Status: Final  Pathologist                                   Date: 02/26/2023  Value: Reviewed by pathologist:  Andrey Magana.  DELLA Arvizu*                       Status: Final  Total Protein                                 Date: 02/26/2023  Value: 5.4 (A)     Ref range: 6.4 - 8.3 g/dL     Status: Final  Albumin (calculated)                          Date: 02/26/2023  Value: 3.7         Ref range: 3.2 - 5.2 g/dL Status: Final  Albumin %                                     Date: 02/26/2023  Value: 69 (A)      Ref range: 45 - 65 %          Status: Final  Alpha-1-Globulin                              Date: 02/26/2023  Value: 0.2         Ref range: 0.1 - 0.4 g/dL     Status: Final  Alpha 1 %                                     Date: 02/26/2023  Value: 3           Ref range: 3 - 6 %            Status: Final  Alpha-2-Globulin                              Date: 02/26/2023  Value: 0.7         Ref range: 0.5 - 0.9 g/dL     Status: Final  Alpha 2 %                                     Date: 02/26/2023  Value: 13          Ref range: 6 - 13 %           Status: Final  Beta Globulin                                 Date: 02/26/2023  Value: 0.5         Ref range: 0.5 - 1.1 g/dL     Status: Final  Beta Percent                                  Date: 02/26/2023  Value: 9 (A)       Ref range: 11 - 19 %          Status: Final  Gamma Globulin                                Date: 02/26/2023  Value: 0.3 (A)     Ref range: 0.5 - 1.5 g/dL     Status: Final  Gamma Globulin %                              Date: 02/26/2023  Value: 6 (A)       Ref range: 9 - 20 %           Status: Final  Total Prot. Sum                               Date: 02/26/2023  Value: 5.4 (A)     Ref range: 6.3 - 8.2 g/dL     Status: Final  Total Prot. Sum,%                             Date: 02/26/2023  Value: 100         Ref range: 98 - 102 %         Status: Final  Protein Electrophoresis, Serum                Date: 02/26/2023  Value:               Status: Final                   Value:HYPOGAMMAGLOBULINEMIA IS PRESENT.  MAY BE OBSERVED IN VARIETY OF CONDITIONS INCLUDING MALIGNANT LYMPHOPROLIFERATIVE DISEASE (MULTIPLE MYELOMA, LYMPHOMA, etc.), VARIOUS CYTOTOXIC OR IMMUNOSUPPRESSIVE DRUGS (LONG TERM STEROIDS, etc.), PLASMAPHORESIS, AND SOME                  Comment:   IMMUNODEFICIENCY CONDITIONS. IMMUNOFIXATION IS NEGATIVE FOR MONOCLONAL IMMUNOGLOBULIN.     Pathologist Date: 02/26/2023  Value: Reviewed by pathologist:  Luana oNlan.  DELLA Gomes*                       Status: Final  Kappa Free Light Chains QNT                   Date: 02/26/2023  Value: 2.10 (A)    Ref range: 0.37 - 1.94 mg/dL  Status: Final  Lambda Free Light Chains QNT                  Date: 02/26/2023  Value: 1.28        Ref range: 0.57 - 2.63 mg/dL  Status: Final  Free Kappa/Lambda Ratio                       Date: 02/26/2023  Value: 1.64        Ref range: 0.26 - 1.65        Status: Final  SUKH                                           Date: 02/26/2023  Value: NEGATIVE    Ref range: NEGATIVE           Status: Final  ANUEL Antibodies Screen                         Date: 02/26/2023  Value: 0.1         Ref range: <0.7 U/mL          Status: Final                Comment:       Reference Range:  <0.7 Negative  0.7-1.0 Equivocal  >1.0 Positive        ANUEL Screen includes U1RNP,RNP70,Sm,Ro(SS-A),La(SS-B),CENP,Scl-70,Terri-1    Anti ds DNA                                   Date: 02/26/2023  Value: <0.5        Ref range: <10.0 IU/mL        Status: Final                Comment:       Reference Range:  <10.0 Negative  10.0-15.0 Equivocal  >15.0 Positive          Complement C3                                 Date: 02/26/2023  Value: 121         Ref range: 90 - 180 mg/dL     Status: Final  Complement C4                                 Date: 02/26/2023  Value: 25          Ref range: 10 - 40 mg/dL      Status: Final  Glucose                                       Date: 02/27/2023  Value: 96          Ref range: 70 - 99 mg/dL      Status: Final  BUN                                           Date: 02/27/2023  Value: 28 (A)      Ref range: 8 - 23 mg/dL       Status: Final  Creatinine                                    Date: 02/27/2023  Value: 1.05 (A)    Ref range: 0.50 - 0.90 mg/dL  Status: Final  Est, Glom Filt Rate                           Date: 02/27/2023  Value: 53 (A)      Ref range: >60 mL/min/1.73m2  Status: Final                Comment:       These results are not intended for use in patients <25years of age. eGFR results are calculated without a race factor using the 2021 CKD-EPI equation. Careful clinical correlation is recommended, particularly when comparing to results   calculated using previous equations. The CKD-EPI equation is less accurate in patients with extremes of muscle mass, extra-renal   metabolism of creatine, excessive creatine ingestion, or following therapy that affects   renal tubular secretion.     Bun/Cre Ratio                                 Date: 02/27/2023  Value: 27 (A)      Ref range: 9 - 20             Status: Final  Calcium                                       Date: 02/27/2023  Value: 8.9         Ref range: 8.6 - 10.4 mg/dL   Status: Final  Sodium                                        Date: 02/27/2023  Value: 141         Ref range: 135 - 144 mmol/L   Status: Final  Potassium                                     Date: 02/27/2023  Value: 4.7         Ref range: 3.7 - 5.3 mmol/L   Status: Final  Chloride                                      Date: 02/27/2023  Value: 102         Ref range: 98 - 107 mmol/L    Status: Final  CO2                                           Date: 02/27/2023  Value: 28          Ref range: 20 - 31 mmol/L     Status: Final  Anion Gap                                     Date: 02/27/2023  Value: 11          Ref range: 9 - 17 mmol/L      Status: Final  WBC, UA                                       Date: 02/26/2023  Value: 0 TO 2      Ref range: 0 - 5 /HPF         Status: Final  RBC, UA                                       Date: 02/26/2023  Value: None        Ref range: 0 - 2 /HPF         Status: Final  Epithelial Cells UA                           Date: 02/26/2023  Value: 2 TO 5      Ref range: 0 - 5 /HPF         Status: Final  Bacteria, UA                                  Date: 02/26/2023  Value: FEW (A)     Ref range: None               Status: Final  ANCA Myeloperoxidase                          Date: 02/26/2023  Value: <0.3        Ref range: 0.0 - 3.5 AU/mL    Status: Final                Comment:       Reference Range:     <3.5  Negative  3.5-5.0  Equivocal     >5.0  Positive          ANCA Proteinase 3                             Date: 02/26/2023  Value: <0.7        Ref range: 0.0 - 2.0 AU/mL    Status: Final                Comment:       Reference Range:     <2.0  Negative  2.0-3.0  Equivocal     >3.0  Positive          Serum IFX Interp                              Date: 02/26/2023  Value: IMMUNOFIXATION IS NEGATIVE FOR MONOCLONAL IMMUNOG*                       Status: Final  Pathologist                                   Date: 02/26/2023  Value: Reviewed by pathologist:  Oralia Moya. DELLA Arvizu*                       Status: Final  Sed Rate                                      Date: 02/27/2023  Value: 4           Ref range: 0 - 30 mm/Hr       Status: Final  CRP                                           Date: 02/27/2023  Value: <3.0        Ref range: 0.0 - 5.0 mg/L     Status: Final  Glucose                                       Date: 02/28/2023  Value: 126 (A)     Ref range: 70 - 99 mg/dL      Status: Final  BUN                                           Date: 02/28/2023  Value: 25 (A)      Ref range: 8 - 23 mg/dL       Status: Final  Creatinine                                    Date: 02/28/2023  Value: 1.15 (A)    Ref range: 0.50 - 0.90 mg/dL  Status: Final  Est, Glom Filt Rate                           Date: 02/28/2023  Value: 48 (A)      Ref range: >60 mL/min/1.73m2  Status: Final                Comment:       These results are not intended for use in patients <25years of age. eGFR results are calculated without a race factor using the 2021 CKD-EPI equation. Careful clinical correlation is recommended, particularly when comparing to results   calculated using previous equations.   The CKD-EPI equation is less accurate in patients with extremes of muscle mass, extra-renal   metabolism of creatine, excessive creatine ingestion, or following therapy that affects   renal tubular secretion.    Bun/Cre Ratio                                 Date: 02/28/2023  Value: 22 (A)      Ref range: 9 - 20             Status: Final  Calcium                                       Date: 02/28/2023  Value: 9.5         Ref range: 8.6 - 10.4 mg/dL   Status: Final  Sodium                                        Date: 02/28/2023  Value: 141         Ref range: 135 - 144 mmol/L   Status: Final  Potassium                                     Date: 02/28/2023  Value: 5.1         Ref range: 3.7 - 5.3 mmol/L   Status: Final  Chloride                                      Date: 02/28/2023  Value: 104         Ref range: 98 - 107 mmol/L    Status: Final  CO2                                           Date: 02/28/2023  Value: 28          Ref range: 20 - 31 mmol/L     Status: Final  Anion Gap                                     Date: 02/28/2023  Value: 9           Ref range: 9 - 17 mmol/L      Status: Final  ------------    Radiology last 7 days:  US RENAL COMPLETE    Result Date: 2/27/2023  Simple bilateral renal cysts. Right nephrolithiasis.  No hydronephrosis. Unremarkable ultrasound urinary bladder.     XR CHEST PORTABLE    Result Date: 2/25/2023  Mild bibasilar infiltrates or atelectasis.  Correlate clinically to exclude pneumonia with consideration for imaging follow-up. Findings associated with COPD.        Pending Labs     Order Current Status    ALDOSTERONE In process    Metanephrines Plasma Free In process    RENIN In process        Discharge Medications    Current Discharge Medication List    START taking these medications    meclizine (ANTIVERT) 25 MG CHEW  Take 1 tablet by mouth 3 times daily as needed (vertigo)  Qty: 90 tablet Refills: 0    NIFEdipine (PROCARDIA XL) 30 MG extended release tablet  Take 1 tablet by mouth daily  Qty: 30 tablet Refills: 3          Current Discharge Medication  List        Current Discharge Medication List    CONTINUE these medications which have NOT CHANGED    lisinopril (PRINIVIL;ZESTRIL) 20 MG tablet  TAKE 1 (ONE) TABLET (20 MG TOTAL) BY MOUTH EVERY MORNING . Calcium Carbonate-Vitamin D (OYSTER SHELL CALCIUM/D) 500-5 MG-MCG TABS  Take 1 tablet by mouth daily    vitamin D3 (CHOLECALCIFEROL) 10 MCG (400 UNIT) TABS tablet  Take by mouth daily    gabapentin (NEURONTIN) 100 MG capsule  Take 1 capsule by mouth daily for 90 days.   Qty: 90 capsule Refills: 0  Associated Diagnoses:Neuropathy    atorvastatin (LIPITOR) 40 MG tablet  TAKE 1 TABLET BY MOUTH  DAILY  Qty: 90 tablet Refills: 3  Comments: Requesting 1 year supply  Associated Diagnoses:Dyslipidemia    pantoprazole (PROTONIX) 20 MG tablet  TAKE 1 TABLET BY MOUTH  DAILY  Qty: 90 tablet Refills: 3  Comments: Requesting 1 year supply  Associated Diagnoses:Gastroesophageal reflux disease, unspecified whether esophagitis present; Gaytan's esophagus with dysplasia    clopidogrel (PLAVIX) 75 MG tablet  TAKE 1 TABLET BY MOUTH  DAILY  Qty: 90 tablet Refills: 3  Comments: Requesting 1 year supply  Associated Diagnoses:History of arterial ischemic stroke    denosumab (PROLIA) 60 MG/ML SOSY SC injection  Inject 1 mL into the skin once for 1 dose  Qty: 1 mL Refills: 5  Associated Diagnoses:Senile osteoporosis    Cyanocobalamin (B-12) 1000 MCG SUBL  Place 1 tablet under the tongue daily  Qty: 90 tablet Refills: 5  Associated Diagnoses:B12 deficiency    BABY ASPIRIN PO  Take by mouth Indications: take 3 times a week ( she stopped and I have asked she resume as it was recommended by Cardio)           Current Discharge Medication List    STOP taking these medications    cloNIDine (CATAPRES-TTS-1) 0.1 MG/24HR PTWK  Comments:  Reason for Stopping:    carvedilol (COREG) 25 MG tablet  Comments:  Reason for Stopping:    meclizine (ANTIVERT) 12.5 MG tablet  Comments:  Reason for Stopping:    Calcium Carbonate-Vit D-Min (CALCIUM 1200) 9317-9475 MG-UNIT CHEW  Comments:  Reason for Stopping:          Time Spent on Discharge:  40 minutes were spent in patient examination, evaluation, counseling as well as medication reconciliation, prescriptions for required medications, discharge plan, and follow up.     Electronically signed by Jennifer Pack MD on 2/28/23 at 8:53 AM EST

## 2023-02-28 NOTE — PLAN OF CARE
Problem: ABCDS Injury Assessment  Goal: Absence of physical injury  Outcome: Progressing     Problem: Pain  Goal: Verbalizes/displays adequate comfort level or baseline comfort level  Outcome: Progressing     Problem: Neurosensory - Adult  Goal: Achieves stable or improved neurological status  Outcome: Progressing     Problem: Cardiovascular - Adult  Goal: Maintains optimal cardiac output and hemodynamic stability  Outcome: Progressing     Problem: Metabolic/Fluid and Electrolytes - Adult  Goal: Electrolytes maintained within normal limits  Outcome: Progressing

## 2023-02-28 NOTE — PROGRESS NOTES
Discharge instructions were gone through with the patient. All questions were answered. The patient was wheeled to her daughters car without any issue.

## 2023-02-28 NOTE — DISCHARGE INSTR - DIET
Good nutrition is important when healing from an illness, injury, or surgery. Follow any nutrition recommendations given to you during your hospital stay. If you were given an oral nutrition supplement while in the hospital, continue to take this supplement at home. You can take it with meals, in-between meals, and/or before bedtime. These supplements can be purchased at most local grocery stores, pharmacies, and chain [employer]-stores. If you have any questions about your diet or nutrition, call the hospital and ask for the dietitian. General diet, no restrictions.

## 2023-03-01 ENCOUNTER — CARE COORDINATION (OUTPATIENT)
Dept: CASE MANAGEMENT | Age: 83
End: 2023-03-01

## 2023-03-01 ENCOUNTER — TELEPHONE (OUTPATIENT)
Dept: FAMILY MEDICINE CLINIC | Age: 83
End: 2023-03-01

## 2023-03-01 DIAGNOSIS — I10 UNCONTROLLED HYPERTENSION: Primary | ICD-10-CM

## 2023-03-01 LAB
ALDOSTERONE COMMENT: NORMAL
ALDOSTERONE: 6.9 NG/DL

## 2023-03-01 PROCEDURE — 1111F DSCHRG MED/CURRENT MED MERGE: CPT | Performed by: FAMILY MEDICINE

## 2023-03-01 NOTE — CARE COORDINATION
[address] Care Transitions Initial Follow Up Call    Call within 2 business days of discharge: Yes    Patient Current Location: [address] Transition Nurse contacted the patient by telephone to perform post hospital discharge assessment. Verified name and  with patient as identifiers. Provided introduction to self, and explanation of the Care Transition Nurse role. Patient: Ghulam Neville Patient : 1940   MRN: [id number]  Reason for Admission: Hypertensive urgency  Discharge Date: 23 RARS: Readmission Risk Score: 12.2      Last Discharge  [address]       Date Complaint Diagnosis Description Type Department Provider    23 Hypertension Hypertensive urgency . .. ED to Hosp-Admission (Discharged) (ADMITTED) Golden Anne MD; Ck Rosales. .. Was this an external facility discharge? No Discharge Facility: [facility]    Challenges to be reviewed by the provider   Additional needs identified to be addressed with provider: No  none               Method of communication with provider: none. Was able to contact Jesus De Souza for initial transitional outreach. She stated that her blood pressure was high this morning. She said that it was 177/90. She said that she did not get her new BP medication last night, so her daughter is picking it up now. She will recheck her BP after she takes it this morning. She denied blurred vision, headache, still has some tingling in her Lt arm/mouth and feet. She did say that she had a burning feeling in her Lt check and it was red in color. She said that it lasted throughout the night and was difficult to sleep. She said that it was not as bad now. She said that she has some dizziness, but it again is not as bad and it has been. Her daughter went to  her new medications and she has all her home medications. She does have a follow up with PCP, nephrologist and ENT. She is waiting on a call from the neurologist office for appointment. Reviewed lab work due and she will be getting in a week. She had no further questions/concerns or needs at this time. Care Transition Nurse reviewed discharge instructions with patient who verbalized understanding. The patient was given an opportunity to ask questions and does not have any further questions or concerns at this time. Were discharge instructions available to patient? Yes. Reviewed appropriate site of care based on symptoms and resources available to patient including: PCP  Specialist  When to call 911. The patient agrees to contact the PCP office for questions related to their healthcare. Advance Care Planning:   Does patient have an Advance Directive: patient declined education. Medication reconciliation was performed with patient, who verbalizes understanding of administration of home medications. Medications reviewed, 1111F entered: yes    Was patient discharged with a pulse oximeter? no    Non-face-to-face services provided:  Obtained and reviewed discharge summary and/or continuity of care documents    Offered patient enrollment in the Remote Patient Monitoring (RPM) program for in-home monitoring:  will address at next outreach . Care Transitions 24 Hour Call    Do you have a copy of your discharge instructions?: Yes  Do you have all of your prescriptions and are they filled?: Yes  Have you been contacted by a [employer] [address]?: No  Have you scheduled your follow up appointment?: Yes  How are you going to get to your appointment?: Car - family or friend to transport  Do you feel like you have everything you need to keep you well at home?: Yes  Care Transitions Interventions         Discussed follow-up appointments. If no appointment was previously scheduled, appointment scheduling offered: Yes. Is follow up appointment scheduled within 7 days of discharge? Yes.     Follow Up  Future Appointments   Date Time Provider Bulmaro Casanova   3/7/2023  2:00 PM Jose Watson MD [address] [facility]   3/29/2023  1:10 PM HERMINIO Dow - CNP [facility] None   4/24/2023 11:30 AM Latha Ortiz MD [facility] Transition Nurse provided contact information. Plan for follow-up call in 5-7 days based on severity of symptoms and risk factors.   Plan for next call: symptom management-follow up on PCP appointment BP and any side effects from BP and new medication  Orlin Burkett RN

## 2023-03-01 NOTE — TELEPHONE ENCOUNTER
Pt has a appt on 03/07/2023 we didn't have any opening on this Friday and on Monday 930 was to early for her

## 2023-03-01 NOTE — TELEPHONE ENCOUNTER
Care Transitions Initial Follow Up Call    Outreach made within 2 business days of discharge: Yes    Patient: Miryam Weiss Patient : 1940   MRN: [id number]  Reason for Admission: There are no discharge diagnoses documented for the most recent discharge.  Discharge Date: 23       Spoke with: pt     Discharge department/facility: [facility] Interactive Patient Contact:  Was patient able to fill all prescriptions: Yes  Was patient instructed to bring all medications to the follow-up visit: No:   Is patient taking all medications as directed in the discharge summary? Yes  Does patient understand their discharge instructions: Yes  Does patient have questions or concerns that need addressed prior to 7-14 day follow up office visit: yes -     Scheduled appointment with PCP within 7-14 days    Follow Up  Future Appointments   Date Time Provider Department Center   2023 11:30 AM Maricarmen Salians MD [facility]       Molly Hou MA

## 2023-03-02 LAB
METANEPH/PLASMA INTERP: NORMAL
METANEPHRINE: 0.29 NMOL/L (ref 0–0.49)
NORMETANEPHRINE PLASMA: 0.87 NMOL/L (ref 0–0.89)
RENIN ACTIVITY: 0.2 NG/ML/HR
RENIN COMMENT: NORMAL

## 2023-03-06 ENCOUNTER — HOSPITAL ENCOUNTER (OUTPATIENT)
Age: 83
Setting detail: SPECIMEN
Discharge: HOME | End: 2023-03-06
Payer: MEDICARE

## 2023-03-06 DIAGNOSIS — I10 UNCONTROLLED HYPERTENSION: ICD-10-CM

## 2023-03-06 LAB
ANION GAP SERPL CALCULATED.3IONS-SCNC: 14 MMOL/L (ref 9–17)
BUN SERPL-MCNC: 25 MG/DL (ref 8–23)
CALCIUM SERPL-MCNC: 9.7 MG/DL (ref 8.6–10.4)
CHLORIDE SERPL-SCNC: 104 MMOL/L (ref 98–107)
CO2 SERPL-SCNC: 25 MMOL/L (ref 20–31)
CREAT SERPL-MCNC: 0.98 MG/DL (ref 0.5–0.9)
GFR SERPL CREATININE-BSD FRML MDRD: 58 ML/MIN/1.73M2
GLUCOSE SERPL-MCNC: 93 MG/DL (ref 70–99)
POTASSIUM SERPL-SCNC: 4.4 MMOL/L (ref 3.7–5.3)
SODIUM SERPL-SCNC: 143 MMOL/L (ref 135–144)

## 2023-03-06 PROCEDURE — 36415 COLL VENOUS BLD VENIPUNCTURE: CPT

## 2023-03-06 PROCEDURE — 80048 BASIC METABOLIC PNL TOTAL CA: CPT

## 2023-03-07 ENCOUNTER — OFFICE VISIT (OUTPATIENT)
Dept: FAMILY MEDICINE CLINIC | Age: 83
End: 2023-03-07

## 2023-03-07 VITALS
SYSTOLIC BLOOD PRESSURE: 139 MMHG | DIASTOLIC BLOOD PRESSURE: 80 MMHG | WEIGHT: 120.6 LBS | OXYGEN SATURATION: 99 % | BODY MASS INDEX: 23.68 KG/M2 | TEMPERATURE: 97.1 F | HEIGHT: 60 IN | HEART RATE: 96 BPM

## 2023-03-07 DIAGNOSIS — N18.31 STAGE 3A CHRONIC KIDNEY DISEASE (HCC): ICD-10-CM

## 2023-03-07 DIAGNOSIS — Z09 HOSPITAL DISCHARGE FOLLOW-UP: Primary | ICD-10-CM

## 2023-03-07 DIAGNOSIS — I10 ESSENTIAL HYPERTENSION: ICD-10-CM

## 2023-03-07 DIAGNOSIS — R42 VERTIGO: ICD-10-CM

## 2023-03-07 DIAGNOSIS — M26.623 BILATERAL TEMPOROMANDIBULAR JOINT PAIN: ICD-10-CM

## 2023-03-07 DIAGNOSIS — K22.70 BARRETT'S ESOPHAGUS WITHOUT DYSPLASIA: ICD-10-CM

## 2023-03-07 DIAGNOSIS — E53.8 B12 DEFICIENCY: ICD-10-CM

## 2023-03-07 DIAGNOSIS — G47.09 OTHER INSOMNIA: ICD-10-CM

## 2023-03-07 DIAGNOSIS — K21.9 GASTROESOPHAGEAL REFLUX DISEASE, UNSPECIFIED WHETHER ESOPHAGITIS PRESENT: ICD-10-CM

## 2023-03-07 DIAGNOSIS — E78.5 DYSLIPIDEMIA: ICD-10-CM

## 2023-03-07 DIAGNOSIS — G62.9 NEUROPATHY: ICD-10-CM

## 2023-03-07 DIAGNOSIS — I10 UNCONTROLLED HYPERTENSION: ICD-10-CM

## 2023-03-07 DIAGNOSIS — E55.9 VITAMIN D DEFICIENCY: ICD-10-CM

## 2023-03-07 DIAGNOSIS — R42 DIZZINESS AND GIDDINESS: ICD-10-CM

## 2023-03-07 DIAGNOSIS — F41.1 GAD (GENERALIZED ANXIETY DISORDER): ICD-10-CM

## 2023-03-07 PROBLEM — I16.0 HYPERTENSIVE URGENCY: Status: RESOLVED | Noted: 2023-02-27 | Resolved: 2023-03-07

## 2023-03-07 PROBLEM — R00.1 SINUS BRADYCARDIA: Status: RESOLVED | Noted: 2022-08-25 | Resolved: 2023-03-07

## 2023-03-07 RX ORDER — LISINOPRIL 20 MG/1
20 TABLET ORAL DAILY
Qty: 90 TABLET | Refills: 1 | Status: SHIPPED | OUTPATIENT
Start: 2023-03-07

## 2023-03-07 RX ORDER — NIFEDIPINE 30 MG/1
30 TABLET, EXTENDED RELEASE ORAL DAILY
Qty: 90 TABLET | Refills: 1 | Status: SHIPPED | OUTPATIENT
Start: 2023-03-07

## 2023-03-07 RX ORDER — ATORVASTATIN CALCIUM 40 MG/1
40 TABLET, FILM COATED ORAL DAILY
Qty: 90 TABLET | Refills: 1 | Status: SHIPPED | OUTPATIENT
Start: 2023-03-07

## 2023-03-07 RX ORDER — MECLIZINE HYDROCHLORIDE 25 MG/1
25 TABLET ORAL 3 TIMES DAILY PRN
Qty: 90 TABLET | Refills: 0 | Status: SHIPPED | OUTPATIENT
Start: 2023-03-07 | End: 2023-03-17

## 2023-03-07 RX ORDER — ESCITALOPRAM OXALATE 5 MG/1
5 TABLET ORAL DAILY
Qty: 90 TABLET | Refills: 1 | Status: SHIPPED | OUTPATIENT
Start: 2023-03-07

## 2023-03-07 SDOH — ECONOMIC STABILITY: FOOD INSECURITY: WITHIN THE PAST 12 MONTHS, THE FOOD YOU BOUGHT JUST DIDN'T LAST AND YOU DIDN'T HAVE MONEY TO GET MORE.: NEVER TRUE

## 2023-03-07 SDOH — ECONOMIC STABILITY: INCOME INSECURITY: HOW HARD IS IT FOR YOU TO PAY FOR THE VERY BASICS LIKE FOOD, HOUSING, MEDICAL CARE, AND HEATING?: NOT HARD AT ALL

## 2023-03-07 SDOH — ECONOMIC STABILITY: FOOD INSECURITY: WITHIN THE PAST 12 MONTHS, YOU WORRIED THAT YOUR FOOD WOULD RUN OUT BEFORE YOU GOT MONEY TO BUY MORE.: NEVER TRUE

## 2023-03-07 ASSESSMENT — PATIENT HEALTH QUESTIONNAIRE - PHQ9
SUM OF ALL RESPONSES TO PHQ QUESTIONS 1-9: 0
2. FEELING DOWN, DEPRESSED OR HOPELESS: 0
SUM OF ALL RESPONSES TO PHQ QUESTIONS 1-9: 0
SUM OF ALL RESPONSES TO PHQ9 QUESTIONS 1 & 2: 0
1. LITTLE INTEREST OR PLEASURE IN DOING THINGS: 0
SUM OF ALL RESPONSES TO PHQ QUESTIONS 1-9: 0
SUM OF ALL RESPONSES TO PHQ QUESTIONS 1-9: 0

## 2023-03-07 ASSESSMENT — ENCOUNTER SYMPTOMS
DIARRHEA: 0
EYE REDNESS: 0
BACK PAIN: 0
EYE PAIN: 0
CONSTIPATION: 0
ABDOMINAL PAIN: 0
PHOTOPHOBIA: 0
BLOOD IN STOOL: 0
EYE DISCHARGE: 0
SHORTNESS OF BREATH: 0
COUGH: 0
VOMITING: 0
NAUSEA: 0
STRIDOR: 0
SORE THROAT: 0

## 2023-03-07 NOTE — PROGRESS NOTES
Post-Discharge Transitional Care Follow Up      Eliecer Pepper   YOB: 1940    Date of Office Visit:  3/7/2023  Date of Hospital Admission: 2/25/23  Date of Hospital Discharge: 2/28/23  Readmission Risk Score (high >=14%. Medium >=10%):Readmission Risk Score: 12.2      Care management risk score Rising risk (score 2-5) and Complex Care (Scores >=6): No Risk Score On File     Non face to face  following discharge, date last encounter closed (first attempt may have been earlier): 03/01/2023     Call initiated 2 business days of discharge: Yes     Hospital discharge follow-up  -     ID DISCHARGE MEDS RECONCILED W/ CURRENT OUTPATIENT MED LIST    Medical Decision Making: moderate complexity  No follow-ups on file. On this date 3/7/2023 I have spent 20 minutes reviewing previous notes, test results and face to face with the patient discussing the diagnosis and importance of compliance with the treatment plan as well as documenting on the day of the visit. Subjective:   HPI  Here for follow up from the Hospital for C/O vertigo as well as dizziness as well as uncontrolled HTN,   Multiple drug intolerances to BP meds, SARAH . Current C/O dental pain and feeling of grinding of teeth. Also C/O Ear ache- advised to see dentist for mouth guard that will help with stress related TMJ Sx  Advised to follow up with   Inpatient course: Discharge summary reviewed- see chart.   States living with daughter with cancer- multiple myeloma - states hard trying to live with her  Interval history/Current status: stable    Patient Active Problem List   Diagnosis    Dyslipidemia    Neuropathy    B12 deficiency    Vitamin D deficiency    Gaytan's esophagus without dysplasia    Gastroesophageal reflux disease    Essential hypertension    Age-related osteoporosis without current pathological fracture    SARAH (generalized anxiety disorder)    Other insomnia    Stage 3a chronic kidney disease (HCC)    Sinus bradycardia Radiculopathy, lumbar region    Dizziness and giddiness    Uncontrolled hypertension    Vertigo    Hypertensive urgency       Medications listed as ordered at the time of discharge from hospital     Medication List            Accurate as of March 7, 2023  2:02 PM. If you have any questions, ask your nurse or doctor. CONTINUE taking these medications      atorvastatin 40 MG tablet  Commonly known as: LIPITOR  TAKE 1 TABLET BY MOUTH  DAILY     B-12 1000 MCG Subl  Place 1 tablet under the tongue daily     BABY ASPIRIN PO     clopidogrel 75 MG tablet  Commonly known as: PLAVIX  TAKE 1 TABLET BY MOUTH  DAILY     gabapentin 100 MG capsule  Commonly known as: Neurontin  Take 1 capsule by mouth daily for 90 days. lisinopril 20 MG tablet  Commonly known as: PRINIVIL;ZESTRIL     meclizine 25 MG Chew  Commonly known as: ANTIVERT  Take 1 tablet by mouth 3 times daily as needed (vertigo)     NIFEdipine 30 MG extended release tablet  Commonly known as: PROCARDIA XL  Take 1 tablet by mouth daily     Oyster Shell Calcium/D 500-5 MG-MCG Tabs     pantoprazole 20 MG tablet  Commonly known as: PROTONIX  TAKE 1 TABLET BY MOUTH  DAILY     Prolia 60 MG/ML Sosy SC injection  Generic drug: denosumab  Inject 1 mL into the skin once for 1 dose     vitamin D3 10 MCG (400 UNIT) Tabs tablet  Commonly known as: CHOLECALCIFEROL               Medications marked \"taking\" at this time  Outpatient Medications Marked as Taking for the 3/7/23 encounter (Office Visit) with Ross Roldan MD   Medication Sig Dispense Refill    meclizine (ANTIVERT) 25 MG CHEW Take 1 tablet by mouth 3 times daily as needed (vertigo) 90 tablet 0    NIFEdipine (PROCARDIA XL) 30 MG extended release tablet Take 1 tablet by mouth daily 30 tablet 3    lisinopril (PRINIVIL;ZESTRIL) 20 MG tablet TAKE 1 (ONE) TABLET (20 MG TOTAL) BY MOUTH EVERY MORNING .       Calcium Carbonate-Vitamin D (OYSTER SHELL CALCIUM/D) 500-5 MG-MCG TABS Take 1 tablet by mouth daily vitamin D3 (CHOLECALCIFEROL) 10 MCG (400 UNIT) TABS tablet Take by mouth daily      gabapentin (NEURONTIN) 100 MG capsule Take 1 capsule by mouth daily for 90 days. 90 capsule 0    atorvastatin (LIPITOR) 40 MG tablet TAKE 1 TABLET BY MOUTH  DAILY 90 tablet 3    pantoprazole (PROTONIX) 20 MG tablet TAKE 1 TABLET BY MOUTH  DAILY 90 tablet 3    clopidogrel (PLAVIX) 75 MG tablet TAKE 1 TABLET BY MOUTH  DAILY 90 tablet 3    Cyanocobalamin (B-12) 1000 MCG SUBL Place 1 tablet under the tongue daily 90 tablet 5    BABY ASPIRIN PO Take by mouth Indications: take 3 times a week ( she stopped and I have asked she resume as it was recommended by Cardio)          Medications patient taking as of now reconciled against medications ordered at time of hospital discharge: Yes    Review of Systems   Constitutional:  Negative for chills and fever. HENT:  Positive for dental problem. Negative for congestion, ear pain, hearing loss, nosebleeds, sore throat and tinnitus. Eyes:  Negative for photophobia, pain, discharge and redness. Respiratory:  Negative for cough, shortness of breath and stridor. Cardiovascular:  Negative for chest pain, palpitations and leg swelling. Gastrointestinal:  Negative for abdominal pain, blood in stool, constipation, diarrhea, nausea and vomiting. Endocrine: Negative for polydipsia. Genitourinary:  Negative for dysuria, flank pain, frequency, hematuria and urgency. Musculoskeletal:  Negative for back pain, myalgias and neck pain. Skin:  Negative for rash. Allergic/Immunologic: Negative for environmental allergies. Neurological:  Positive for dizziness and light-headedness. Negative for tremors, seizures, weakness and headaches. Hematological:  Does not bruise/bleed easily. Psychiatric/Behavioral:  Positive for sleep disturbance. Negative for hallucinations and suicidal ideas. The patient is nervous/anxious.       Objective:    BP (!) 149/76   Pulse 93   Temp 97.1 °F (36.2 °C) Ht 5' (1.524 m)   Wt 120 lb 9.6 oz (54.7 kg)   SpO2 99%   BMI 23.55 kg/m²   Physical Exam  Constitutional:       General: She is not in acute distress. Appearance: She is well-developed. HENT:      Head: Normocephalic and atraumatic. Right Ear: External ear normal.      Left Ear: External ear normal.      Nose: Nose normal.      Mouth/Throat:      Mouth: Mucous membranes are moist.   Eyes:      Conjunctiva/sclera: Conjunctivae normal.      Pupils: Pupils are equal, round, and reactive to light. Cardiovascular:      Rate and Rhythm: Normal rate and regular rhythm. Heart sounds: Normal heart sounds. Pulmonary:      Effort: Pulmonary effort is normal.      Breath sounds: Normal breath sounds. Abdominal:      General: Bowel sounds are normal. There is no distension. Palpations: Abdomen is soft. Tenderness: There is no abdominal tenderness. Musculoskeletal:         General: Normal range of motion. Cervical back: Normal range of motion and neck supple. Skin:     General: Skin is warm and dry. Neurological:      General: No focal deficit present. Mental Status: She is alert and oriented to person, place, and time. Mental status is at baseline. Psychiatric:         Mood and Affect: Mood normal.         Behavior: Behavior normal.         Thought Content:  Thought content normal.         Judgment: Judgment normal.     An electronic signature was used to authenticate this note.  --Alexis Lino MD

## 2023-03-08 ENCOUNTER — CARE COORDINATION (OUTPATIENT)
Dept: CASE MANAGEMENT | Age: 83
End: 2023-03-08

## 2023-03-08 NOTE — CARE COORDINATION
[address] Care Transitions Follow Up Call      Patient: Faith Dooley  Patient : 1940   MRN: [id number]  Reason for Admission: Hypertensive urgency  Discharge Date: 23 RARS: Readmission Risk Score: 12.2      Needs to be reviewed by the provider   Additional needs identified to be addressed with provider: no  none             Method of communication with provider: none. 1st attempt to reach patient for Care Transitions. [facility] requesting return call. Contact information provided. [phone]      Future Appointments   Date Time Provider Bulmaro Casanova   3/29/2023  1:10 PM Mali Montoya, APRN - CNP [facility] None   2023 11:30 AM Valerie Caldwell MD [facility]   2023 12:20 PM Zack Correa MD Neuro Spec Neurology -        Care Transitions Subsequent and Final Call    Subsequent and Final Calls  Care Transitions Interventions  Other Interventions:             Plan for next call:  follow up on new medications added from PCP appt. BP readings and associated symptoms.     Marylou Machado RN

## 2023-03-09 ENCOUNTER — CARE COORDINATION (OUTPATIENT)
Dept: CASE MANAGEMENT | Age: 83
End: 2023-03-09

## 2023-03-12 DIAGNOSIS — G62.9 NEUROPATHY: ICD-10-CM

## 2023-03-13 RX ORDER — GABAPENTIN 100 MG/1
CAPSULE ORAL
Qty: 90 CAPSULE | Refills: 1 | Status: SHIPPED | OUTPATIENT
Start: 2023-03-13 | End: 2023-06-13

## 2023-03-16 ENCOUNTER — TELEPHONE (OUTPATIENT)
Dept: FAMILY MEDICINE CLINIC | Age: 83
End: 2023-03-16

## 2023-04-13 PROBLEM — I10 UNCONTROLLED HYPERTENSION: Status: RESOLVED | Noted: 2023-02-25 | Resolved: 2023-04-13

## 2023-04-16 DIAGNOSIS — I10 ESSENTIAL HYPERTENSION: ICD-10-CM

## 2023-04-17 RX ORDER — LISINOPRIL 20 MG/1
20 TABLET ORAL DAILY
Qty: 100 TABLET | Refills: 2 | Status: SHIPPED | OUTPATIENT
Start: 2023-04-17

## 2023-05-30 ENCOUNTER — TELEPHONE (OUTPATIENT)
Dept: FAMILY MEDICINE CLINIC | Age: 83
End: 2023-05-30

## 2023-05-30 DIAGNOSIS — I10 ESSENTIAL HYPERTENSION: ICD-10-CM

## 2023-05-30 RX ORDER — NIFEDIPINE 30 MG/1
30 TABLET, EXTENDED RELEASE ORAL DAILY
Qty: 100 TABLET | Refills: 2 | Status: SHIPPED | OUTPATIENT
Start: 2023-05-30

## 2023-05-30 NOTE — TELEPHONE ENCOUNTER
Pt says since starting Lexapro 10mg 12 weeks ago she is tired, dizzy and has bad sweats.  Please advise

## 2023-06-02 ENCOUNTER — TELEPHONE (OUTPATIENT)
Dept: FAMILY MEDICINE CLINIC | Age: 83
End: 2023-06-02

## 2023-06-02 NOTE — TELEPHONE ENCOUNTER
Pt called and stated that she need a rx for meclizine please advise pt stated that the last refill was give on 03/2/2023 by you

## 2023-06-06 DIAGNOSIS — R42 VERTIGO: Primary | ICD-10-CM

## 2023-06-06 RX ORDER — MECLIZINE HCL 12.5 MG/1
12.5 TABLET ORAL 3 TIMES DAILY PRN
Qty: 30 TABLET | Refills: 0 | Status: SHIPPED | OUTPATIENT
Start: 2023-06-06

## 2023-06-07 ENCOUNTER — OFFICE VISIT (OUTPATIENT)
Dept: FAMILY MEDICINE CLINIC | Age: 83
End: 2023-06-07
Payer: MEDICARE

## 2023-06-07 VITALS
SYSTOLIC BLOOD PRESSURE: 131 MMHG | HEIGHT: 60 IN | BODY MASS INDEX: 24.23 KG/M2 | WEIGHT: 123.4 LBS | HEART RATE: 113 BPM | DIASTOLIC BLOOD PRESSURE: 79 MMHG | OXYGEN SATURATION: 99 % | TEMPERATURE: 97 F

## 2023-06-07 DIAGNOSIS — F39 MOOD DISORDER (HCC): ICD-10-CM

## 2023-06-07 DIAGNOSIS — R42 DIZZINESS: ICD-10-CM

## 2023-06-07 DIAGNOSIS — R00.0 TACHYCARDIA: Primary | ICD-10-CM

## 2023-06-07 DIAGNOSIS — K21.9 GASTROESOPHAGEAL REFLUX DISEASE, UNSPECIFIED WHETHER ESOPHAGITIS PRESENT: ICD-10-CM

## 2023-06-07 DIAGNOSIS — E78.5 DYSLIPIDEMIA: ICD-10-CM

## 2023-06-07 DIAGNOSIS — I10 ESSENTIAL HYPERTENSION: ICD-10-CM

## 2023-06-07 DIAGNOSIS — K22.70 BARRETT'S ESOPHAGUS WITHOUT DYSPLASIA: ICD-10-CM

## 2023-06-07 DIAGNOSIS — R42 DIZZINESS AND GIDDINESS: ICD-10-CM

## 2023-06-07 DIAGNOSIS — E55.9 VITAMIN D DEFICIENCY: ICD-10-CM

## 2023-06-07 DIAGNOSIS — F41.1 GAD (GENERALIZED ANXIETY DISORDER): ICD-10-CM

## 2023-06-07 DIAGNOSIS — N18.31 STAGE 3A CHRONIC KIDNEY DISEASE (HCC): ICD-10-CM

## 2023-06-07 DIAGNOSIS — E53.8 B12 DEFICIENCY: ICD-10-CM

## 2023-06-07 PROCEDURE — 99214 OFFICE O/P EST MOD 30 MIN: CPT | Performed by: FAMILY MEDICINE

## 2023-06-07 PROCEDURE — 1123F ACP DISCUSS/DSCN MKR DOCD: CPT | Performed by: FAMILY MEDICINE

## 2023-06-07 PROCEDURE — 3075F SYST BP GE 130 - 139MM HG: CPT | Performed by: FAMILY MEDICINE

## 2023-06-07 PROCEDURE — 3078F DIAST BP <80 MM HG: CPT | Performed by: FAMILY MEDICINE

## 2023-06-07 RX ORDER — FLUOXETINE 10 MG/1
10 CAPSULE ORAL DAILY
Qty: 90 CAPSULE | Refills: 1 | Status: SHIPPED | OUTPATIENT
Start: 2023-06-07

## 2023-06-07 ASSESSMENT — PATIENT HEALTH QUESTIONNAIRE - PHQ9
2. FEELING DOWN, DEPRESSED OR HOPELESS: 0
SUM OF ALL RESPONSES TO PHQ QUESTIONS 1-9: 0
SUM OF ALL RESPONSES TO PHQ9 QUESTIONS 1 & 2: 0
1. LITTLE INTEREST OR PLEASURE IN DOING THINGS: 0
SUM OF ALL RESPONSES TO PHQ QUESTIONS 1-9: 0
1. LITTLE INTEREST OR PLEASURE IN DOING THINGS: 0
2. FEELING DOWN, DEPRESSED OR HOPELESS: 0
SUM OF ALL RESPONSES TO PHQ9 QUESTIONS 1 & 2: 0

## 2023-06-07 ASSESSMENT — ENCOUNTER SYMPTOMS
STRIDOR: 0
NAUSEA: 0
EYE DISCHARGE: 0
PHOTOPHOBIA: 0
CONSTIPATION: 0
SORE THROAT: 0
VOMITING: 0
ABDOMINAL PAIN: 0
DIARRHEA: 0
SHORTNESS OF BREATH: 0
BACK PAIN: 0
BLOOD IN STOOL: 0
COUGH: 0
EYE PAIN: 0
EYE REDNESS: 0

## 2023-06-07 ASSESSMENT — ANXIETY QUESTIONNAIRES
2. NOT BEING ABLE TO STOP OR CONTROL WORRYING: 0
1. FEELING NERVOUS, ANXIOUS, OR ON EDGE: 0
4. TROUBLE RELAXING: 0
5. BEING SO RESTLESS THAT IT IS HARD TO SIT STILL: 0
GAD7 TOTAL SCORE: 0
3. WORRYING TOO MUCH ABOUT DIFFERENT THINGS: 0
6. BECOMING EASILY ANNOYED OR IRRITABLE: 0
7. FEELING AFRAID AS IF SOMETHING AWFUL MIGHT HAPPEN: 0
IF YOU CHECKED OFF ANY PROBLEMS ON THIS QUESTIONNAIRE, HOW DIFFICULT HAVE THESE PROBLEMS MADE IT FOR YOU TO DO YOUR WORK, TAKE CARE OF THINGS AT HOME, OR GET ALONG WITH OTHER PEOPLE: NOT DIFFICULT AT ALL

## 2023-06-07 NOTE — PROGRESS NOTES
Subjective:      Patient ID: Louisa Tellez is a 81 y.o. female. States increased sweating - day and night x 12 weeks- states also BP running low at home. States seen cardiology [address] at and has appt in august.  States feels sweating is related to the lexapro - so stopped it for a week , sx did not improve  States does not help anxiety as well - so stayed off this. States has problems at home that seems to raise HR- states BP is low at home- 392 systolic   I have advised due to her BP being so up and down, she may cut dose of lisinopril to half and take this and take other half later in the day if BP goes up. She has appt with neuro coming up. Review of Systems   Constitutional:  Negative for chills and fever. HENT:  Negative for congestion, ear pain, hearing loss, nosebleeds, sore throat and tinnitus. Eyes:  Negative for photophobia, pain, discharge and redness. Respiratory:  Negative for cough, shortness of breath and stridor. Cardiovascular:  Negative for chest pain, palpitations and leg swelling. Gastrointestinal:  Negative for abdominal pain, blood in stool, constipation, diarrhea, nausea and vomiting. Endocrine: Negative for polydipsia. Genitourinary:  Negative for dysuria, flank pain, frequency, hematuria and urgency. Musculoskeletal:  Negative for back pain, myalgias and neck pain. Skin:  Negative for rash. Allergic/Immunologic: Negative for environmental allergies. Neurological:  Negative for dizziness, tremors, seizures, weakness and headaches. Hematological:  Does not bruise/bleed easily. Psychiatric/Behavioral:  Negative for hallucinations and suicidal ideas. The patient is not nervous/anxious. Objective:   Physical Exam  Constitutional:       General: She is not in acute distress. Appearance: She is well-developed. HENT:      Head: Normocephalic and atraumatic.       Right Ear: External ear normal.      Left Ear: External ear normal.      Nose: Nose

## 2023-07-13 ENCOUNTER — OFFICE VISIT (OUTPATIENT)
Dept: FAMILY MEDICINE CLINIC | Age: 83
End: 2023-07-13
Payer: MEDICARE

## 2023-07-13 VITALS
HEIGHT: 60 IN | OXYGEN SATURATION: 98 % | RESPIRATION RATE: 18 BRPM | WEIGHT: 125 LBS | HEART RATE: 75 BPM | TEMPERATURE: 96.9 F | SYSTOLIC BLOOD PRESSURE: 125 MMHG | DIASTOLIC BLOOD PRESSURE: 75 MMHG | BODY MASS INDEX: 24.54 KG/M2

## 2023-07-13 DIAGNOSIS — F41.1 GAD (GENERALIZED ANXIETY DISORDER): ICD-10-CM

## 2023-07-13 DIAGNOSIS — R00.2 PALPITATIONS: ICD-10-CM

## 2023-07-13 DIAGNOSIS — E55.9 VITAMIN D DEFICIENCY: ICD-10-CM

## 2023-07-13 DIAGNOSIS — R73.9 HYPERGLYCEMIA: ICD-10-CM

## 2023-07-13 DIAGNOSIS — K22.70 BARRETT'S ESOPHAGUS WITHOUT DYSPLASIA: ICD-10-CM

## 2023-07-13 DIAGNOSIS — R42 DIZZINESS AND GIDDINESS: ICD-10-CM

## 2023-07-13 DIAGNOSIS — G47.09 OTHER INSOMNIA: ICD-10-CM

## 2023-07-13 DIAGNOSIS — E78.5 DYSLIPIDEMIA: ICD-10-CM

## 2023-07-13 DIAGNOSIS — R00.0 TACHYCARDIA: Primary | ICD-10-CM

## 2023-07-13 DIAGNOSIS — I10 ESSENTIAL HYPERTENSION: ICD-10-CM

## 2023-07-13 DIAGNOSIS — K21.9 GASTROESOPHAGEAL REFLUX DISEASE, UNSPECIFIED WHETHER ESOPHAGITIS PRESENT: ICD-10-CM

## 2023-07-13 DIAGNOSIS — R53.82 CHRONIC FATIGUE: ICD-10-CM

## 2023-07-13 DIAGNOSIS — E53.8 B12 DEFICIENCY: ICD-10-CM

## 2023-07-13 DIAGNOSIS — M81.0 AGE-RELATED OSTEOPOROSIS WITHOUT CURRENT PATHOLOGICAL FRACTURE: ICD-10-CM

## 2023-07-13 PROCEDURE — 3074F SYST BP LT 130 MM HG: CPT | Performed by: FAMILY MEDICINE

## 2023-07-13 PROCEDURE — 3078F DIAST BP <80 MM HG: CPT | Performed by: FAMILY MEDICINE

## 2023-07-13 PROCEDURE — 99213 OFFICE O/P EST LOW 20 MIN: CPT | Performed by: FAMILY MEDICINE

## 2023-07-13 PROCEDURE — 1123F ACP DISCUSS/DSCN MKR DOCD: CPT | Performed by: FAMILY MEDICINE

## 2023-07-13 ASSESSMENT — ENCOUNTER SYMPTOMS
SORE THROAT: 0
ALLERGIC/IMMUNOLOGIC NEGATIVE: 1
BACK PAIN: 0
STRIDOR: 0
EYE DISCHARGE: 0
DIARRHEA: 0
CONSTIPATION: 0
EYE PAIN: 0
NAUSEA: 0
EYE REDNESS: 0
ABDOMINAL PAIN: 0
VOMITING: 0
RESPIRATORY NEGATIVE: 1
SHORTNESS OF BREATH: 0
GASTROINTESTINAL NEGATIVE: 1
COLOR CHANGE: 0
EYES NEGATIVE: 1
PHOTOPHOBIA: 0
COUGH: 0
BLOOD IN STOOL: 0

## 2023-07-13 ASSESSMENT — ANXIETY QUESTIONNAIRES
5. BEING SO RESTLESS THAT IT IS HARD TO SIT STILL: 0
4. TROUBLE RELAXING: 0
6. BECOMING EASILY ANNOYED OR IRRITABLE: 0
IF YOU CHECKED OFF ANY PROBLEMS ON THIS QUESTIONNAIRE, HOW DIFFICULT HAVE THESE PROBLEMS MADE IT FOR YOU TO DO YOUR WORK, TAKE CARE OF THINGS AT HOME, OR GET ALONG WITH OTHER PEOPLE: NOT DIFFICULT AT ALL
3. WORRYING TOO MUCH ABOUT DIFFERENT THINGS: 0
GAD7 TOTAL SCORE: 0
2. NOT BEING ABLE TO STOP OR CONTROL WORRYING: 0
7. FEELING AFRAID AS IF SOMETHING AWFUL MIGHT HAPPEN: 0
1. FEELING NERVOUS, ANXIOUS, OR ON EDGE: 0

## 2023-07-13 ASSESSMENT — PATIENT HEALTH QUESTIONNAIRE - PHQ9
SUM OF ALL RESPONSES TO PHQ QUESTIONS 1-9: 0
1. LITTLE INTEREST OR PLEASURE IN DOING THINGS: 0
SUM OF ALL RESPONSES TO PHQ QUESTIONS 1-9: 0
SUM OF ALL RESPONSES TO PHQ9 QUESTIONS 1 & 2: 0
2. FEELING DOWN, DEPRESSED OR HOPELESS: 0

## 2023-07-13 NOTE — PROGRESS NOTES
Subjective:      Patient ID: Bk Blunt is a 81 y.o. female. Here for follow up of palpitations. Tachycardia, dizziness and lightheadedness. Procardia was recently   discontinued and patient placed on Bystolic and lisinopril per Dr Terrie Lr for HTN and tachycardia-  Also may help with excessive sweating. that started when she took the lexapro and procardia together- off lexapro. State still on procardia as she has not received the bystolic yet. will follow up as advised. Mood and anxiety around the same- some days worse than others. Sleep has been on and off too. States off lexapro as she though it was giving her side effects- prozac may be a choice but will wait till she tries the Bystolic first.  Will order annual labs for sept and recheck in october  Review of Systems   Constitutional: Negative. Negative for activity change, appetite change, chills, diaphoresis, fatigue and fever. HENT: Negative. Negative for congestion, dental problem, ear pain, hearing loss, nosebleeds, sore throat and tinnitus. Eyes: Negative. Negative for photophobia, pain, discharge, redness and visual disturbance. Respiratory: Negative. Negative for cough, shortness of breath and stridor. Cardiovascular: Negative. Negative for chest pain, palpitations and leg swelling. Gastrointestinal: Negative. Negative for abdominal pain, blood in stool, constipation, diarrhea, nausea and vomiting. Endocrine: Negative. Negative for polydipsia, polyphagia and polyuria. Genitourinary: Negative. Negative for decreased urine volume, dysuria, flank pain, frequency, hematuria and urgency. Musculoskeletal: Negative. Negative for arthralgias, back pain, myalgias and neck pain. Skin: Negative. Negative for color change, pallor and rash. Allergic/Immunologic: Negative. Negative for environmental allergies. Neurological: Negative. Negative for dizziness, tremors, seizures, weakness, light-headedness and headaches.

## 2023-07-29 DIAGNOSIS — E78.5 DYSLIPIDEMIA: ICD-10-CM

## 2023-07-31 RX ORDER — ATORVASTATIN CALCIUM 40 MG/1
40 TABLET, FILM COATED ORAL DAILY
Qty: 100 TABLET | Refills: 2 | Status: SHIPPED | OUTPATIENT
Start: 2023-07-31

## 2023-08-12 DIAGNOSIS — G62.9 NEUROPATHY: ICD-10-CM

## 2023-08-14 RX ORDER — GABAPENTIN 100 MG/1
CAPSULE ORAL
Qty: 90 CAPSULE | Refills: 3 | Status: SHIPPED | OUTPATIENT
Start: 2023-08-14 | End: 2023-11-14

## 2023-10-19 ENCOUNTER — TELEPHONE (OUTPATIENT)
Dept: NEUROLOGY | Age: 83
End: 2023-10-19

## 2023-10-19 NOTE — TELEPHONE ENCOUNTER
10 19 2023 I called the patient times 2 (10 12 2023 and 10 19 2023 at  [phone]) to schedule new patient appointment with one of our providers, BA both times, no response. I mailed the patient a letter asking them to call the office back to schedule this appointment.   KS

## 2024-04-07 DIAGNOSIS — E78.5 DYSLIPIDEMIA: ICD-10-CM

## 2024-04-08 RX ORDER — ATORVASTATIN CALCIUM 40 MG/1
40 TABLET, FILM COATED ORAL DAILY
Qty: 100 TABLET | Refills: 2 | OUTPATIENT
Start: 2024-04-08

## 2024-04-27 DIAGNOSIS — G62.9 NEUROPATHY: ICD-10-CM

## 2024-04-28 RX ORDER — GABAPENTIN 100 MG/1
CAPSULE ORAL
Qty: 100 CAPSULE | Refills: 2 | OUTPATIENT
Start: 2024-04-28 | End: 2024-07-29

## 2024-06-05 DIAGNOSIS — E78.5 DYSLIPIDEMIA: ICD-10-CM

## 2024-06-05 DIAGNOSIS — G62.9 NEUROPATHY: ICD-10-CM

## 2024-06-05 RX ORDER — ATORVASTATIN CALCIUM 40 MG/1
40 TABLET, FILM COATED ORAL DAILY
Qty: 100 TABLET | Refills: 2 | OUTPATIENT
Start: 2024-06-05

## 2024-06-05 RX ORDER — GABAPENTIN 100 MG/1
CAPSULE ORAL
Qty: 60 CAPSULE | Refills: 5 | OUTPATIENT
Start: 2024-06-05 | End: 2024-09-05